# Patient Record
Sex: FEMALE | Race: WHITE | NOT HISPANIC OR LATINO
[De-identification: names, ages, dates, MRNs, and addresses within clinical notes are randomized per-mention and may not be internally consistent; named-entity substitution may affect disease eponyms.]

---

## 2019-03-06 ENCOUNTER — TRANSCRIPTION ENCOUNTER (OUTPATIENT)
Age: 74
End: 2019-03-06

## 2019-03-06 ENCOUNTER — INPATIENT (INPATIENT)
Facility: HOSPITAL | Age: 74
LOS: 0 days | Discharge: ANOTHER HOSPITAL | DRG: 280 | End: 2019-03-07
Attending: INTERNAL MEDICINE | Admitting: FAMILY MEDICINE
Payer: MEDICARE

## 2019-03-06 VITALS
HEART RATE: 140 BPM | RESPIRATION RATE: 26 BRPM | SYSTOLIC BLOOD PRESSURE: 86 MMHG | OXYGEN SATURATION: 90 % | TEMPERATURE: 98 F | DIASTOLIC BLOOD PRESSURE: 50 MMHG | WEIGHT: 186.07 LBS

## 2019-03-06 DIAGNOSIS — E11.9 TYPE 2 DIABETES MELLITUS WITHOUT COMPLICATIONS: ICD-10-CM

## 2019-03-06 DIAGNOSIS — E78.5 HYPERLIPIDEMIA, UNSPECIFIED: ICD-10-CM

## 2019-03-06 DIAGNOSIS — Z90.710 ACQUIRED ABSENCE OF BOTH CERVIX AND UTERUS: Chronic | ICD-10-CM

## 2019-03-06 DIAGNOSIS — R06.03 ACUTE RESPIRATORY DISTRESS: ICD-10-CM

## 2019-03-06 DIAGNOSIS — I10 ESSENTIAL (PRIMARY) HYPERTENSION: ICD-10-CM

## 2019-03-06 DIAGNOSIS — I50.9 HEART FAILURE, UNSPECIFIED: ICD-10-CM

## 2019-03-06 DIAGNOSIS — J10.1 INFLUENZA DUE TO OTHER IDENTIFIED INFLUENZA VIRUS WITH OTHER RESPIRATORY MANIFESTATIONS: ICD-10-CM

## 2019-03-06 DIAGNOSIS — I21.4 NON-ST ELEVATION (NSTEMI) MYOCARDIAL INFARCTION: ICD-10-CM

## 2019-03-06 DIAGNOSIS — Z29.9 ENCOUNTER FOR PROPHYLACTIC MEASURES, UNSPECIFIED: ICD-10-CM

## 2019-03-06 LAB
ALBUMIN SERPL ELPH-MCNC: 3.6 G/DL — SIGNIFICANT CHANGE UP (ref 3.3–5)
ALP SERPL-CCNC: 97 U/L — SIGNIFICANT CHANGE UP (ref 40–120)
ALT FLD-CCNC: 34 U/L — SIGNIFICANT CHANGE UP (ref 12–78)
ANION GAP SERPL CALC-SCNC: 8 MMOL/L — SIGNIFICANT CHANGE UP (ref 5–17)
ANION GAP SERPL CALC-SCNC: 9 MMOL/L — SIGNIFICANT CHANGE UP (ref 5–17)
APTT BLD: 180.6 SEC — CRITICAL HIGH (ref 27.5–36.3)
APTT BLD: 33.4 SEC — SIGNIFICANT CHANGE UP (ref 27.5–36.3)
AST SERPL-CCNC: 32 U/L — SIGNIFICANT CHANGE UP (ref 15–37)
BASOPHILS # BLD AUTO: 0.06 K/UL — SIGNIFICANT CHANGE UP (ref 0–0.2)
BASOPHILS NFR BLD AUTO: 0.4 % — SIGNIFICANT CHANGE UP (ref 0–2)
BILIRUB SERPL-MCNC: 0.4 MG/DL — SIGNIFICANT CHANGE UP (ref 0.2–1.2)
BUN SERPL-MCNC: 21 MG/DL — SIGNIFICANT CHANGE UP (ref 7–23)
BUN SERPL-MCNC: 24 MG/DL — HIGH (ref 7–23)
CALCIUM SERPL-MCNC: 8.1 MG/DL — LOW (ref 8.5–10.1)
CALCIUM SERPL-MCNC: 8.7 MG/DL — SIGNIFICANT CHANGE UP (ref 8.5–10.1)
CHLORIDE SERPL-SCNC: 101 MMOL/L — SIGNIFICANT CHANGE UP (ref 96–108)
CHLORIDE SERPL-SCNC: 102 MMOL/L — SIGNIFICANT CHANGE UP (ref 96–108)
CK MB BLD-MCNC: 3 % — SIGNIFICANT CHANGE UP (ref 0–3.5)
CK MB CFR SERPL CALC: 12.2 NG/ML — HIGH (ref 0–3.6)
CK MB CFR SERPL CALC: 3.6 NG/ML — SIGNIFICANT CHANGE UP (ref 0–3.6)
CK SERPL-CCNC: 406 U/L — HIGH (ref 26–192)
CO2 SERPL-SCNC: 26 MMOL/L — SIGNIFICANT CHANGE UP (ref 22–31)
CO2 SERPL-SCNC: 30 MMOL/L — SIGNIFICANT CHANGE UP (ref 22–31)
CREAT SERPL-MCNC: 1.4 MG/DL — HIGH (ref 0.5–1.3)
CREAT SERPL-MCNC: 1.5 MG/DL — HIGH (ref 0.5–1.3)
EOSINOPHIL # BLD AUTO: 0.01 K/UL — SIGNIFICANT CHANGE UP (ref 0–0.5)
EOSINOPHIL NFR BLD AUTO: 0.1 % — SIGNIFICANT CHANGE UP (ref 0–6)
FLU A RESULT: DETECTED
FLU A RESULT: DETECTED
FLUAV AG NPH QL: DETECTED
FLUBV AG NPH QL: SIGNIFICANT CHANGE UP
GLUCOSE SERPL-MCNC: 147 MG/DL — HIGH (ref 70–99)
GLUCOSE SERPL-MCNC: 370 MG/DL — HIGH (ref 70–99)
HCT VFR BLD CALC: 43.8 % — SIGNIFICANT CHANGE UP (ref 34.5–45)
HCT VFR BLD CALC: 43.8 % — SIGNIFICANT CHANGE UP (ref 34.5–45)
HGB BLD-MCNC: 13.8 G/DL — SIGNIFICANT CHANGE UP (ref 11.5–15.5)
HGB BLD-MCNC: 13.9 G/DL — SIGNIFICANT CHANGE UP (ref 11.5–15.5)
IMM GRANULOCYTES NFR BLD AUTO: 0.9 % — SIGNIFICANT CHANGE UP (ref 0–1.5)
INR BLD: 1.18 RATIO — HIGH (ref 0.88–1.16)
LYMPHOCYTES # BLD AUTO: 1.9 K/UL — SIGNIFICANT CHANGE UP (ref 1–3.3)
LYMPHOCYTES # BLD AUTO: 12.8 % — LOW (ref 13–44)
MAGNESIUM SERPL-MCNC: 2 MG/DL — SIGNIFICANT CHANGE UP (ref 1.6–2.6)
MCHC RBC-ENTMCNC: 29.1 PG — SIGNIFICANT CHANGE UP (ref 27–34)
MCHC RBC-ENTMCNC: 29.2 PG — SIGNIFICANT CHANGE UP (ref 27–34)
MCHC RBC-ENTMCNC: 31.5 GM/DL — LOW (ref 32–36)
MCHC RBC-ENTMCNC: 31.7 GM/DL — LOW (ref 32–36)
MCV RBC AUTO: 91.8 FL — SIGNIFICANT CHANGE UP (ref 80–100)
MCV RBC AUTO: 92.8 FL — SIGNIFICANT CHANGE UP (ref 80–100)
MONOCYTES # BLD AUTO: 1.28 K/UL — HIGH (ref 0–0.9)
MONOCYTES NFR BLD AUTO: 8.6 % — SIGNIFICANT CHANGE UP (ref 2–14)
NEUTROPHILS # BLD AUTO: 11.47 K/UL — HIGH (ref 1.8–7.4)
NEUTROPHILS NFR BLD AUTO: 77.2 % — HIGH (ref 43–77)
NRBC # BLD: 0 /100 WBCS — SIGNIFICANT CHANGE UP (ref 0–0)
NRBC # BLD: 0 /100 WBCS — SIGNIFICANT CHANGE UP (ref 0–0)
NT-PROBNP SERPL-SCNC: 5803 PG/ML — HIGH (ref 0–125)
PHOSPHATE SERPL-MCNC: 4.4 MG/DL — SIGNIFICANT CHANGE UP (ref 2.5–4.5)
PLATELET # BLD AUTO: 298 K/UL — SIGNIFICANT CHANGE UP (ref 150–400)
PLATELET # BLD AUTO: 323 K/UL — SIGNIFICANT CHANGE UP (ref 150–400)
POTASSIUM SERPL-MCNC: 4.3 MMOL/L — SIGNIFICANT CHANGE UP (ref 3.5–5.3)
POTASSIUM SERPL-MCNC: 5 MMOL/L — SIGNIFICANT CHANGE UP (ref 3.5–5.3)
POTASSIUM SERPL-SCNC: 4.3 MMOL/L — SIGNIFICANT CHANGE UP (ref 3.5–5.3)
POTASSIUM SERPL-SCNC: 5 MMOL/L — SIGNIFICANT CHANGE UP (ref 3.5–5.3)
PROCALCITONIN SERPL-MCNC: 0.05 NG/ML — HIGH (ref 0–0.04)
PROT SERPL-MCNC: 8 G/DL — SIGNIFICANT CHANGE UP (ref 6–8.3)
PROTHROM AB SERPL-ACNC: 13.4 SEC — HIGH (ref 10–12.9)
RBC # BLD: 4.72 M/UL — SIGNIFICANT CHANGE UP (ref 3.8–5.2)
RBC # BLD: 4.77 M/UL — SIGNIFICANT CHANGE UP (ref 3.8–5.2)
RBC # FLD: 13.7 % — SIGNIFICANT CHANGE UP (ref 10.3–14.5)
RBC # FLD: 13.8 % — SIGNIFICANT CHANGE UP (ref 10.3–14.5)
RSV RESULT: SIGNIFICANT CHANGE UP
RSV RNA RESP QL NAA+PROBE: SIGNIFICANT CHANGE UP
SODIUM SERPL-SCNC: 136 MMOL/L — SIGNIFICANT CHANGE UP (ref 135–145)
SODIUM SERPL-SCNC: 140 MMOL/L — SIGNIFICANT CHANGE UP (ref 135–145)
TROPONIN I SERPL-MCNC: 0.3 NG/ML — HIGH (ref 0.01–0.04)
TROPONIN I SERPL-MCNC: 4.03 NG/ML — HIGH (ref 0.01–0.04)
WBC # BLD: 12.54 K/UL — HIGH (ref 3.8–10.5)
WBC # BLD: 14.85 K/UL — HIGH (ref 3.8–10.5)
WBC # FLD AUTO: 12.54 K/UL — HIGH (ref 3.8–10.5)
WBC # FLD AUTO: 14.85 K/UL — HIGH (ref 3.8–10.5)

## 2019-03-06 PROCEDURE — 71045 X-RAY EXAM CHEST 1 VIEW: CPT | Mod: 26

## 2019-03-06 PROCEDURE — 99291 CRITICAL CARE FIRST HOUR: CPT

## 2019-03-06 PROCEDURE — 99223 1ST HOSP IP/OBS HIGH 75: CPT | Mod: AI,GC

## 2019-03-06 PROCEDURE — 93010 ELECTROCARDIOGRAM REPORT: CPT

## 2019-03-06 RX ORDER — INSULIN GLARGINE 100 [IU]/ML
30 INJECTION, SOLUTION SUBCUTANEOUS AT BEDTIME
Qty: 0 | Refills: 0 | Status: DISCONTINUED | OUTPATIENT
Start: 2019-03-06 | End: 2019-03-07

## 2019-03-06 RX ORDER — CLOPIDOGREL BISULFATE 75 MG/1
300 TABLET, FILM COATED ORAL ONCE
Qty: 0 | Refills: 0 | Status: COMPLETED | OUTPATIENT
Start: 2019-03-06 | End: 2019-03-06

## 2019-03-06 RX ORDER — DEXTROSE 50 % IN WATER 50 %
25 SYRINGE (ML) INTRAVENOUS ONCE
Qty: 0 | Refills: 0 | Status: DISCONTINUED | OUTPATIENT
Start: 2019-03-06 | End: 2019-03-07

## 2019-03-06 RX ORDER — INSULIN GLARGINE 100 [IU]/ML
30 INJECTION, SOLUTION SUBCUTANEOUS AT BEDTIME
Qty: 0 | Refills: 0 | Status: DISCONTINUED | OUTPATIENT
Start: 2019-03-06 | End: 2019-03-06

## 2019-03-06 RX ORDER — SODIUM CHLORIDE 9 MG/ML
1000 INJECTION, SOLUTION INTRAVENOUS
Qty: 0 | Refills: 0 | Status: DISCONTINUED | OUTPATIENT
Start: 2019-03-06 | End: 2019-03-07

## 2019-03-06 RX ORDER — IPRATROPIUM/ALBUTEROL SULFATE 18-103MCG
3 AEROSOL WITH ADAPTER (GRAM) INHALATION EVERY 6 HOURS
Qty: 0 | Refills: 0 | Status: DISCONTINUED | OUTPATIENT
Start: 2019-03-06 | End: 2019-03-07

## 2019-03-06 RX ORDER — HEPARIN SODIUM 5000 [USP'U]/ML
5000 INJECTION INTRAVENOUS; SUBCUTANEOUS ONCE
Qty: 0 | Refills: 0 | Status: COMPLETED | OUTPATIENT
Start: 2019-03-06 | End: 2019-03-06

## 2019-03-06 RX ORDER — CLOPIDOGREL BISULFATE 75 MG/1
75 TABLET, FILM COATED ORAL DAILY
Qty: 0 | Refills: 0 | Status: DISCONTINUED | OUTPATIENT
Start: 2019-03-06 | End: 2019-03-06

## 2019-03-06 RX ORDER — CLOPIDOGREL BISULFATE 75 MG/1
75 TABLET, FILM COATED ORAL DAILY
Qty: 0 | Refills: 0 | Status: DISCONTINUED | OUTPATIENT
Start: 2019-03-07 | End: 2019-03-07

## 2019-03-06 RX ORDER — GLUCAGON INJECTION, SOLUTION 0.5 MG/.1ML
1 INJECTION, SOLUTION SUBCUTANEOUS ONCE
Qty: 0 | Refills: 0 | Status: DISCONTINUED | OUTPATIENT
Start: 2019-03-06 | End: 2019-03-06

## 2019-03-06 RX ORDER — HEPARIN SODIUM 5000 [USP'U]/ML
5000 INJECTION INTRAVENOUS; SUBCUTANEOUS EVERY 6 HOURS
Qty: 0 | Refills: 0 | Status: DISCONTINUED | OUTPATIENT
Start: 2019-03-06 | End: 2019-03-07

## 2019-03-06 RX ORDER — DEXTROSE 50 % IN WATER 50 %
15 SYRINGE (ML) INTRAVENOUS ONCE
Qty: 0 | Refills: 0 | Status: DISCONTINUED | OUTPATIENT
Start: 2019-03-06 | End: 2019-03-06

## 2019-03-06 RX ORDER — ASPIRIN/CALCIUM CARB/MAGNESIUM 324 MG
81 TABLET ORAL DAILY
Qty: 0 | Refills: 0 | Status: DISCONTINUED | OUTPATIENT
Start: 2019-03-07 | End: 2019-03-07

## 2019-03-06 RX ORDER — METOPROLOL TARTRATE 50 MG
25 TABLET ORAL EVERY 8 HOURS
Qty: 0 | Refills: 0 | Status: DISCONTINUED | OUTPATIENT
Start: 2019-03-06 | End: 2019-03-07

## 2019-03-06 RX ORDER — DEXTROSE 50 % IN WATER 50 %
25 SYRINGE (ML) INTRAVENOUS ONCE
Qty: 0 | Refills: 0 | Status: DISCONTINUED | OUTPATIENT
Start: 2019-03-06 | End: 2019-03-06

## 2019-03-06 RX ORDER — LATANOPROST 0.05 MG/ML
1 SOLUTION/ DROPS OPHTHALMIC; TOPICAL AT BEDTIME
Qty: 0 | Refills: 0 | Status: DISCONTINUED | OUTPATIENT
Start: 2019-03-06 | End: 2019-03-07

## 2019-03-06 RX ORDER — FUROSEMIDE 40 MG
40 TABLET ORAL DAILY
Qty: 0 | Refills: 0 | Status: DISCONTINUED | OUTPATIENT
Start: 2019-03-06 | End: 2019-03-07

## 2019-03-06 RX ORDER — ASPIRIN/CALCIUM CARB/MAGNESIUM 324 MG
325 TABLET ORAL ONCE
Qty: 0 | Refills: 0 | Status: COMPLETED | OUTPATIENT
Start: 2019-03-06 | End: 2019-03-06

## 2019-03-06 RX ORDER — HEPARIN SODIUM 5000 [USP'U]/ML
INJECTION INTRAVENOUS; SUBCUTANEOUS
Qty: 25000 | Refills: 0 | Status: DISCONTINUED | OUTPATIENT
Start: 2019-03-06 | End: 2019-03-07

## 2019-03-06 RX ORDER — FUROSEMIDE 40 MG
40 TABLET ORAL ONCE
Qty: 0 | Refills: 0 | Status: COMPLETED | OUTPATIENT
Start: 2019-03-06 | End: 2019-03-06

## 2019-03-06 RX ORDER — INSULIN LISPRO 100/ML
VIAL (ML) SUBCUTANEOUS
Qty: 0 | Refills: 0 | Status: DISCONTINUED | OUTPATIENT
Start: 2019-03-06 | End: 2019-03-06

## 2019-03-06 RX ORDER — INFLUENZA VIRUS VACCINE 15; 15; 15; 15 UG/.5ML; UG/.5ML; UG/.5ML; UG/.5ML
0.5 SUSPENSION INTRAMUSCULAR ONCE
Qty: 0 | Refills: 0 | Status: DISCONTINUED | OUTPATIENT
Start: 2019-03-06 | End: 2019-03-07

## 2019-03-06 RX ORDER — INSULIN LISPRO 100/ML
3 VIAL (ML) SUBCUTANEOUS
Qty: 0 | Refills: 0 | Status: DISCONTINUED | OUTPATIENT
Start: 2019-03-06 | End: 2019-03-06

## 2019-03-06 RX ORDER — INSULIN LISPRO 100/ML
VIAL (ML) SUBCUTANEOUS AT BEDTIME
Qty: 0 | Refills: 0 | Status: DISCONTINUED | OUTPATIENT
Start: 2019-03-06 | End: 2019-03-06

## 2019-03-06 RX ORDER — INSULIN LISPRO 100/ML
VIAL (ML) SUBCUTANEOUS
Qty: 0 | Refills: 0 | Status: DISCONTINUED | OUTPATIENT
Start: 2019-03-06 | End: 2019-03-07

## 2019-03-06 RX ORDER — INSULIN GLARGINE 100 [IU]/ML
80 INJECTION, SOLUTION SUBCUTANEOUS
Qty: 0 | Refills: 0 | COMMUNITY

## 2019-03-06 RX ORDER — ATORVASTATIN CALCIUM 80 MG/1
40 TABLET, FILM COATED ORAL AT BEDTIME
Qty: 0 | Refills: 0 | Status: DISCONTINUED | OUTPATIENT
Start: 2019-03-06 | End: 2019-03-07

## 2019-03-06 RX ORDER — DEXTROSE 50 % IN WATER 50 %
12.5 SYRINGE (ML) INTRAVENOUS ONCE
Qty: 0 | Refills: 0 | Status: DISCONTINUED | OUTPATIENT
Start: 2019-03-06 | End: 2019-03-07

## 2019-03-06 RX ORDER — INSULIN LISPRO 100/ML
VIAL (ML) SUBCUTANEOUS EVERY 6 HOURS
Qty: 0 | Refills: 0 | Status: DISCONTINUED | OUTPATIENT
Start: 2019-03-06 | End: 2019-03-06

## 2019-03-06 RX ORDER — ATORVASTATIN CALCIUM 80 MG/1
1 TABLET, FILM COATED ORAL
Qty: 0 | Refills: 0 | COMMUNITY

## 2019-03-06 RX ORDER — GLUCAGON INJECTION, SOLUTION 0.5 MG/.1ML
1 INJECTION, SOLUTION SUBCUTANEOUS ONCE
Qty: 0 | Refills: 0 | Status: DISCONTINUED | OUTPATIENT
Start: 2019-03-06 | End: 2019-03-07

## 2019-03-06 RX ORDER — DEXTROSE 50 % IN WATER 50 %
15 SYRINGE (ML) INTRAVENOUS ONCE
Qty: 0 | Refills: 0 | Status: DISCONTINUED | OUTPATIENT
Start: 2019-03-06 | End: 2019-03-07

## 2019-03-06 RX ORDER — DEXTROSE 50 % IN WATER 50 %
12.5 SYRINGE (ML) INTRAVENOUS ONCE
Qty: 0 | Refills: 0 | Status: DISCONTINUED | OUTPATIENT
Start: 2019-03-06 | End: 2019-03-06

## 2019-03-06 RX ORDER — SODIUM CHLORIDE 9 MG/ML
1000 INJECTION, SOLUTION INTRAVENOUS
Qty: 0 | Refills: 0 | Status: DISCONTINUED | OUTPATIENT
Start: 2019-03-06 | End: 2019-03-06

## 2019-03-06 RX ORDER — INSULIN LISPRO 100/ML
3 VIAL (ML) SUBCUTANEOUS
Qty: 0 | Refills: 0 | Status: DISCONTINUED | OUTPATIENT
Start: 2019-03-06 | End: 2019-03-07

## 2019-03-06 RX ORDER — FUROSEMIDE 40 MG
40 TABLET ORAL
Qty: 0 | Refills: 0 | Status: DISCONTINUED | OUTPATIENT
Start: 2019-03-06 | End: 2019-03-06

## 2019-03-06 RX ADMIN — Medication 40 MILLIGRAM(S): at 12:00

## 2019-03-06 RX ADMIN — INSULIN GLARGINE 30 UNIT(S): 100 INJECTION, SOLUTION SUBCUTANEOUS at 21:22

## 2019-03-06 RX ADMIN — HEPARIN SODIUM 1000 UNIT(S)/HR: 5000 INJECTION INTRAVENOUS; SUBCUTANEOUS at 13:42

## 2019-03-06 RX ADMIN — HEPARIN SODIUM 0 UNIT(S)/HR: 5000 INJECTION INTRAVENOUS; SUBCUTANEOUS at 20:42

## 2019-03-06 RX ADMIN — ATORVASTATIN CALCIUM 40 MILLIGRAM(S): 80 TABLET, FILM COATED ORAL at 15:24

## 2019-03-06 RX ADMIN — HEPARIN SODIUM 5000 UNIT(S): 5000 INJECTION INTRAVENOUS; SUBCUTANEOUS at 13:41

## 2019-03-06 RX ADMIN — Medication 200 MILLIGRAM(S): at 21:47

## 2019-03-06 RX ADMIN — Medication 25 MILLIGRAM(S): at 13:54

## 2019-03-06 RX ADMIN — Medication 75 MILLIGRAM(S): at 13:54

## 2019-03-06 RX ADMIN — HEPARIN SODIUM 750 UNIT(S)/HR: 5000 INJECTION INTRAVENOUS; SUBCUTANEOUS at 21:47

## 2019-03-06 RX ADMIN — LATANOPROST 1 DROP(S): 0.05 SOLUTION/ DROPS OPHTHALMIC; TOPICAL at 15:24

## 2019-03-06 RX ADMIN — Medication 3 MILLILITER(S): at 14:17

## 2019-03-06 RX ADMIN — Medication 3 MILLILITER(S): at 20:12

## 2019-03-06 RX ADMIN — Medication 25 MILLIGRAM(S): at 21:22

## 2019-03-06 RX ADMIN — CLOPIDOGREL BISULFATE 300 MILLIGRAM(S): 75 TABLET, FILM COATED ORAL at 15:24

## 2019-03-06 RX ADMIN — Medication 325 MILLIGRAM(S): at 13:54

## 2019-03-06 RX ADMIN — Medication 40 MILLIGRAM(S): at 18:47

## 2019-03-06 NOTE — CONSULT NOTE ADULT - SUBJECTIVE AND OBJECTIVE BOX
REQUESTING PHYSICIAN:    REASON FOR CONSULT: Patient is a 73y old  Female who presents with a chief complaint of NSTEMI , c/o sob , chest pressure (06 Mar 2019 13:15)      CHIEF COMPLAINT: Patient is a 73y old  Female who presents with a chief complaint of NSTEMI , c/o sob , chest pressure (06 Mar 2019 13:15)      HPI:  Pt is a 72 y/o F with PMHx of HTN, HLD, Type II DM (on Lantus, glimepiride), who presents with worsening SOB and chest pressure over last month. Pt states she has been feeling SOB after walking 50 feet for past month. Pt states chest pressure lies over midline chest, and radiates to both sides of her jaw, and both arms.  as per cardio [facility] pe heart gp pt was seen ago in there office     Of note pt had previously followed Dr. Christie (cardiology), and has had 3 coronary catheterizations over past 10 years, with most recent cath 2008 for abnormal ECG. Pt states these caths have been unremarkable.    In the ED: VS T 98, , /79 RR 26 90%RA. Labs significant for WBC 14.85, Cr 1.50, glu 370, Ca 8.1, proCal 0.05, POCT 277. Pt was started on BiPAP in ED, with improval in sats to 100%. Pt evaluated by ICU in ED. (06 Mar 2019 13:15)      PAST MEDICAL / SURGICAL HISTORY:  PAST MEDICAL & SURGICAL HISTORY:  Uterine cancer  Diabetes mellitus  Hypertension  Hyperlipidemia  S/P MAYNOR (total abdominal hysterectomy)        FAMILY HISTORY: FAMILY HISTORY:  Family history of type 2 diabetes mellitus (Father)      MEDICATIONS  (STANDING):  ALBUTerol/ipratropium for Nebulization 3 milliLiter(s) Nebulizer every 6 hours  atorvastatin 40 milliGRAM(s) Oral at bedtime  clopidogrel Tablet 300 milliGRAM(s) Oral once  dextrose 5%. 1000 milliLiter(s) (50 mL/Hr) IV Continuous <Continuous>  dextrose 50% Injectable 12.5 Gram(s) IV Push once  dextrose 50% Injectable 25 Gram(s) IV Push once  dextrose 50% Injectable 25 Gram(s) IV Push once  furosemide   Injectable 40 milliGRAM(s) IV Push two times a day  heparin  Infusion.  Unit(s)/Hr (10 mL/Hr) IV Continuous <Continuous>  insulin glargine Injectable (LANTUS) 30 Unit(s) SubCutaneous at bedtime  insulin lispro (HumaLOG) corrective regimen sliding scale   SubCutaneous every 6 hours  latanoprost 0.005% Ophthalmic Solution 1 Drop(s) Both EYES at bedtime  metoprolol tartrate 25 milliGRAM(s) Oral every 8 hours  oseltamivir 75 milliGRAM(s) Oral two times a day    MEDICATIONS  (PRN):  dextrose 40% Gel 15 Gram(s) Oral once PRN Blood Glucose LESS THAN 70 milliGRAM(s)/deciliter  glucagon  Injectable 1 milliGRAM(s) IntraMuscular once PRN Glucose LESS THAN 70 milligrams/deciliter  heparin  Injectable 5000 Unit(s) IV Push every 6 hours PRN For aPTT less than 40      Allergies    No Known Allergies    Intolerances        REVIEW OF SYSTEMS:    CONSTITUTIONAL: No weakness, fevers or chills  EYES/ENT: No visual changes;  No vertigo or throat pain   NECK: No pain or stiffness  RESPIRATORY: No cough, wheezing, hemoptysis; No shortness of breath  CARDIOVASCULAR: No chest pain or palpitations  GASTROINTESTINAL: No abdominal pain. No nausea, vomiting, or hematemesis; No diarrhea or constipation. No melena or hematochezia.  GENITOURINARY: No dysuria, frequency or hematuria  NEUROLOGICAL: No numbness or weakness  SKIN: No itching or rash  All other review of systems is negative unless indicated above    VITAL SIGNS:   Vital Signs Last 24 Hrs  T(C): 37.2 (06 Mar 2019 13:45), Max: 37.2 (06 Mar 2019 13:45)  T(F): 98.9 (06 Mar 2019 13:45), Max: 98.9 (06 Mar 2019 13:45)  HR: 108 (06 Mar 2019 14:18) (103 - 140)  BP: 134/79 (06 Mar 2019 13:45) (86/50 - 185/79)  BP(mean): --  RR: 18 (06 Mar 2019 13:45) (16 - 32)  SpO2: 98% (06 Mar 2019 14:18) (90% - 100%)    I&O's Summary      PHYSICAL EXAM:    Constitutional: NAD, awake and alert, well-developed  Eyes:  EOMI,  Pupils round, No oral cyanosis.  Pulmonary: Non-labored, breath sounds are clear bilaterally, No wheezing, rales or rhonchi  Cardiovascular: S1 and S2, regular rate and rhythm, no Murmurs, gallops or rubs  Gastrointestinal: Bowel Sounds present, soft, nontender.   Lymph: No peripheral edema. No cervical lymphadenopathy.  Neurological: Alert, no focal deficits  Skin: No rashes.  Psych:  Mood & affect appropriate    LABS: All Labs Reviewed:                        13.8   14.85 )-----------( 323      ( 06 Mar 2019 10:55 )             43.8     06 Mar 2019 10:54    136    |  101    |  21     ----------------------------<  370    5.0     |  26     |  1.50     Ca    8.1        06 Mar 2019 10:54    TPro  8.0    /  Alb  3.6    /  TBili  0.4    /  DBili  x      /  AST  32     /  ALT  34     /  AlkPhos  97     06 Mar 2019 10:54    PT/INR - ( 06 Mar 2019 10:54 )   PT: 13.4 sec;   INR: 1.18 ratio         PTT - ( 06 Mar 2019 10:54 )  PTT:33.4 sec  CARDIAC MARKERS ( 06 Mar 2019 10:54 )  .296 ng/mL / x     / x     / x     / 3.6 ng/mL      Blood Culture:   03-06 @ 10:54  Pro Bnp [id number]        EKG:    Imaging: REQUESTING PHYSICIAN: Dr. Medeiros    REASON FOR CONSULT: Patient is a 73y old  Female who presents with a chief complaint of NSTEMI , c/o sob , chest pressure (06 Mar 2019 13:15)      CHIEF COMPLAINT: Patient is a 73y old  Female who presents with a chief complaint of NSTEMI , c/o sob , chest pressure (06 Mar 2019 13:15)      HPI:  Pt is a 72 y/o F with PMHx of HTN, HLD, Type II DM (on Lantus, glimepiride), who presents with worsening SOB and chest pressure over last month. Pt states she has been feeling SOB after walking 50 feet for past month. Pt states chest pressure lies over midline chest, and radiates to both sides of her jaw, and both arms.  as per cardio [facility] pe heart gp pt was seen ago in there office     Of note pt had previously followed Dr. Christie (cardiology), and has had 3 coronary catheterizations over past 10 years, with most recent cath 2008 for abnormal ECG. Pt states these caths have been unremarkable.    In the ED: VS T 98, , /79 RR 26 90%RA. Labs significant for WBC 14.85, Cr 1.50, glu 370, Ca 8.1, proCal 0.05, POCT 277. Pt was started on BiPAP in ED, with improval in sats to 100%. Pt evaluated by ICU in ED. (06 Mar 2019 13:15)    Subjective : Pt was seen and examined at ICU , denies chest pain . SOB improved , comfortable at rest . She was seen By Dr. Cooper 2 days ago for the first time for c/o intermittent chest pain and worsening SOB especially with exertion, going on for 6 mths but worsening over the time  . EKG showed NSR , LBBB. ( unclear LBBB is new or old , but she has been evaluated by Dr. Shahnaz Bolden in the past for abnormal EKG and cardiac cath done , claims 3 caths over 10 years last being in 2008 with nonobstructive coronaries according to the pt .  Pt was on ASA 325mg , Bystolic 2.5 mg QD , Losartan 50mg QD and Lipitor 20mg  ( cardiac meds) at home         PAST MEDICAL & SURGICAL HISTORY:  Uterine cancer  Diabetes mellitus  Hypertension  Hyperlipidemia  S/P MAYNOR (total abdominal hysterectomy)        FAMILY HISTORY: FAMILY HISTORY:  Family history of type 2 diabetes mellitus (Father)      MEDICATIONS  (STANDING):  ALBUTerol/ipratropium for Nebulization 3 milliLiter(s) Nebulizer every 6 hours  atorvastatin 40 milliGRAM(s) Oral at bedtime  clopidogrel Tablet 300 milliGRAM(s) Oral once  dextrose 5%. 1000 milliLiter(s) (50 mL/Hr) IV Continuous <Continuous>  dextrose 50% Injectable 12.5 Gram(s) IV Push once  dextrose 50% Injectable 25 Gram(s) IV Push once  dextrose 50% Injectable 25 Gram(s) IV Push once  furosemide   Injectable 40 milliGRAM(s) IV Push two times a day  heparin  Infusion.  Unit(s)/Hr (10 mL/Hr) IV Continuous <Continuous>  insulin glargine Injectable (LANTUS) 30 Unit(s) SubCutaneous at bedtime  insulin lispro (HumaLOG) corrective regimen sliding scale   SubCutaneous every 6 hours  latanoprost 0.005% Ophthalmic Solution 1 Drop(s) Both EYES at bedtime  metoprolol tartrate 25 milliGRAM(s) Oral every 8 hours  oseltamivir 75 milliGRAM(s) Oral two times a day    MEDICATIONS  (PRN):  dextrose 40% Gel 15 Gram(s) Oral once PRN Blood Glucose LESS THAN 70 milliGRAM(s)/deciliter  glucagon  Injectable 1 milliGRAM(s) IntraMuscular once PRN Glucose LESS THAN 70 milligrams/deciliter  heparin  Injectable 5000 Unit(s) IV Push every 6 hours PRN For aPTT less than 40      Allergies    No Known Allergies    Intolerances        REVIEW OF SYSTEMS:    CONSTITUTIONAL: No fever or chills  EYES/ENT: No visual changes;  No vertigo or throat pain   NECK: No pain or stiffness  RESPIRATORY: No cough, wheezing, hemoptysis; + shortness of breath  CARDIOVASCULAR: + chest pain .Denies palpitations  GASTROINTESTINAL: No abdominal pain. No nausea, vomiting, or hematemesis; No diarrhea or constipation. No melena or hematochezia.  GENITOURINARY: No dysuria, frequency or hematuria  NEUROLOGICAL: No numbness or weakness  SKIN: No itching or rash  All other review of systems is negative unless indicated above    VITAL SIGNS:   Vital Signs Last 24 Hrs  T(C): 37.2 (06 Mar 2019 13:45), Max: 37.2 (06 Mar 2019 13:45)  T(F): 98.9 (06 Mar 2019 13:45), Max: 98.9 (06 Mar 2019 13:45)  HR: 108 (06 Mar 2019 14:18) (103 - 140)  BP: 134/79 (06 Mar 2019 13:45) (86/50 - 185/79)  BP(mean): --  RR: 18 (06 Mar 2019 13:45) (16 - 32)  SpO2: 98% (06 Mar 2019 14:18) (90% - 100%)    I&O's Summary      PHYSICAL EXAM:    Constitutional:  awake and alert, in no distress   Eyes:  EOMI,  Pupils round, No oral cyanosis.  Pulmonary: Non-labored, B/L fair entry fair , b/l occasional wheezes and crackes at bases  Cardiovascular: S1 and S2, regular rate and rhythm, ESM / EDM 2/6 RSB  Gastrointestinal: Bowel Sounds present, soft, nontender.   Lymph: No peripheral edema. No cervical lymphadenopathy.  Neurological: Alert, no focal deficits  Skin: No rashes.  Psych:  Mood & affect appropriate    LABS: All Labs Reviewed:                        13.8   14.85 )-----------( 323      ( 06 Mar 2019 10:55 )             43.8     06 Mar 2019 10:54    136    |  101    |  21     ----------------------------<  370    5.0     |  26     |  1.50     Ca    8.1        06 Mar 2019 10:54    TPro  8.0    /  Alb  3.6    /  TBili  0.4    /  DBili  x      /  AST  32     /  ALT  34     /  AlkPhos  97     06 Mar 2019 10:54    PT/INR - ( 06 Mar 2019 10:54 )   PT: 13.4 sec;   INR: 1.18 ratio         PTT - ( 06 Mar 2019 10:54 )  PTT:33.4 sec  CARDIAC MARKERS ( 06 Mar 2019 10:54 )  .296 ng/mL / x     / x     / x     / 3.6 ng/mL      Blood Culture:   03-06 @ 10:54  Pro Bnp [id number]        EKG: NSR, LBBB  Tele: sinus tachy in 100-100s     Imaging:     EXAM:  XR CHEST PORTABLE URGENT 1V                            PROCEDURE DATE:  03/06/2019          INTERPRETATION:  Clinical information: Dyspnea.    Technique: Frontal view of the chest.    comparison: None available.    Findings: The lungs are clear. The heart size is enlarged. There are mild   multilevel degenerative changes of the thoracic spine.    IMPRESSION: Cardiomegaly.

## 2019-03-06 NOTE — CONSULT NOTE ADULT - PROBLEM SELECTOR RECOMMENDATION 2
c/o chest pressure and SOB ,  Trop 0.29, Serial troponin q 6 hrs , trend CE   EKG : NSR , LBBB9 (not new )   Obtain Echo  Continue ASA , Plavix 300mg Loaded , 75 mg daily , Lipitor 40mg , Metoprolol   Therapeutic anticoagulation with Heparin drip , monitor PTT  Continue cardiac monitor  Will need cardiac catheterization to evaluate for obstructive coronary artery disease and will arrange transfer to [facility] .

## 2019-03-06 NOTE — DISCHARGE NOTE PROVIDER - HOSPITAL COURSE
FROM ADMISSION H+P:     HPI:    Pt is a 74 y/o F with PMHx of HTN, HLD, Type II DM (on Lantus, glimepiride), who presents with worsening SOB and chest pressure over last month. Pt states she has been feeling SOB after walking 50 feet for past month. Pt states chest pressure lies over midline chest, and radiates to both sides of her jaw, and both arms.  as per cardio [facility] pe heart gp pt was seen ago in there office         Of note pt had previously followed Dr. Christie (cardiology), and has had 3 coronary catheterizations over past 10 years, with most recent cath 2008 for abnormal ECG. Pt states these caths have been unremarkable.        In the ED: VS T 98, , /79 RR 26 90%RA. Labs significant for WBC 14.85, Cr 1.50, glu 370, Ca 8.1, proCal 0.05, POCT 277. Pt was started on BiPAP in ED, with improval in sats to 100%. Pt evaluated by ICU in ED. (06 Mar 2019 13:15)            ---    HOSPITAL COURSE:     73 yr female wit PMHx of HTN, HLD, CAD s/p catherization x3 over 10 yr period, last in 2008. Pt endorses no abnormal findings. Asthma, uterine Ca s/p Rt therapy  and TSH/BSO, Glaucoma who presents today (3/6/19) [facility] hospital from home with progressive worsening SOB with productive cough of clear sputum over past 2 to 3 days accompanied by worsening midsternal chest pain radiating to bilateral jaw over same period relieved with rest. Pt endorses SOB has been progressive over past 6 month period and was meant for cardiac workup with nuclear stress test tomorrow. Pt denies N/V/D/F/C. Pt denies and increase in Lower Extremity edema. In E.D. Pt found to be hypoxic with SPO2 90% on Rm Air with increased WOB and crackles on exam, placed on Bipap therapy, received 40mg lasix IVP. ECG on admission demonstrate LBBB (not new), C.E. with +0.296. In addition pt found to have Influenza A+. Consult and admission to ICU for NSTEMI/ADHF and Flu A+. Patient was transferred to ICU for further monitoring. Patient was started on course of tamiflu for influenza A. Given full dose ASA + plavix load. Continued on DAPT and heparin gtt. ARB held for FLORIDALMA. TTE was performed. Diuresed with IV lasix. Cardio Dr. Boyce arranged for transfer to [facility] for cardiac catheterization.    ---    CONSULTANTS:     Cardiology: [facility]    --- FROM ADMISSION H+P:     HPI:    Pt is a 72 y/o F with PMHx of HTN, HLD, Type II DM (on Lantus, glimepiride), who presents with worsening SOB and chest pressure over last month. Pt states she has been feeling SOB after walking 50 feet for past month. Pt states chest pressure lies over midline chest, and radiates to both sides of her jaw, and both arms.  as per cardio [facility] pe heart gp pt was seen ago in there office         Of note pt had previously followed Dr. Christie (cardiology), and has had 3 coronary catheterizations over past 10 years, with most recent cath 2008 for abnormal ECG. Pt states these caths have been unremarkable.        In the ED: VS T 98, , /79 RR 26 90%RA. Labs significant for WBC 14.85, Cr 1.50, glu 370, Ca 8.1, proCal 0.05, POCT 277. Pt was started on BiPAP in ED, with improval in sats to 100%. Pt evaluated by ICU in ED. (06 Mar 2019 13:15)            ---    HOSPITAL COURSE:     73 yr female wit PMHx of HTN, HLD, CAD s/p catherization x3 over 10 yr period, last in 2008. Pt endorses no abnormal findings. Asthma, uterine Ca s/p Rt therapy  and TSH/BSO, Glaucoma who presents today (3/6/19) [facility] hospital from home with progressive worsening SOB with productive cough of clear sputum over past 2 to 3 days accompanied by worsening midsternal chest pain radiating to bilateral jaw over same period relieved with rest. Pt endorses SOB has been progressive over past 6 month period and was meant for cardiac workup with nuclear stress test tomorrow. Pt denies N/V/D/F/C. Pt denies and increase in Lower Extremity edema. In E.D. Pt found to be hypoxic with SPO2 90% on Rm Air with increased WOB and crackles on exam, placed on Bipap therapy, received 40mg lasix IVP. ECG on admission demonstrate LBBB (not new), C.E. with +0.296. In addition pt found to have Influenza A+. Consult and admission to ICU for NSTEMI/ADHF and Flu A+. Patient was transferred to ICU for further monitoring. Patient was started on course of tamiflu for influenza A. Given full dose ASA + plavix load. Continued on DAPT and heparin gtt. ARB held for FLORIDALMA. TTE was performed. Diuresed with IV lasix. Cardio Dr. Boyce arranged for transfer to [facility] for cardiac catheterization. Seen by Dr. Menezes ([facility]) on 3/7, \[facility] [place] [facility]\"" transfer arranged for possible cardiac catheterization on 3/7/19 (ate breakfast).    ---    CONSULTANTS:     Cardiology: [facility]    ---

## 2019-03-06 NOTE — ED PROVIDER NOTE - PROGRESS NOTE DETAILS
EKG w/ LBBB, no prior to compare, per  pt scheduled to see Dr Lopez for cardiac evaluation, pt having MORTON x 1 month. cxr w/ pulm congestion, labs sig for markedly elevated bnp, elevated trop, improved on bipap, given iv laxis, cardio consulted, pt admitted for further eval and mgmt

## 2019-03-06 NOTE — ED PROVIDER NOTE - NS ED ROS FT
GEN: no fever, no chills, no weakness  HENT: no eye pain, no visual changes, no ear pain, no visual or hearing changes, no sore throat, no swelling or neck pain  CV: no chest pain, no palpitations, no dizziness, no swelling  RESP: no coughing, + sob, +IWOB, no MORTON  GI: no abd pain, no distension, no nausea, no vomiting, no diarrhea, no constipation  : no dysuria,  no frequency, no hematuria, no discharge, no flank pain  MUSCULOSKELETAL: no myalgia, no arthralgia, no joint swelling, no bruising   SKIN: no rash, no wounds, no itching  NEURO: no change in mentation, no visual changes, no HA, no focal weakness, no trouble speaking, no gait abnormalities, no dizziness  PSYCH: no suidical ideation, no homicidal ideation, no depression, no anxiety, no hallucinations

## 2019-03-06 NOTE — CONSULT NOTE ADULT - ASSESSMENT
Assessment:    Plan:    #Neuro:  -neuro checks q 4 hrs and prn for changes  -bedrest at present    #Pulm:  -pt with Hx of asthma, now with ADHF/NSTEMI  -bipap therapy - titrate to ph 7.35-7.45; PCO2 35-45; SPO2 > 92%  -duoneb therapy q 6 hrs prn sob/wheezes      #CV:    #GI/:    #ID:    #FEN/ENDO/HEME:          Critical Care Time: 40minutes   Reviewing data, imaging, discussing with multidisciplinary team, not inclusive of procedures, discussing goals of care with family Assessment:    Plan:    #Neuro:  -neuro checks q 4 hrs and prn for changes  -bedrest at present    #Pulm:  -pt with Hx of asthma, now with ADHF/NSTEMI  -bipap therapy - titrate to ph 7.35-7.45; PCO2 35-45; SPO2 > 92%  -duoneb therapy q 6 hrs prn sob/wheezes  -HOB >/= 30 degree angle  -Pt Influenza A +  -Oseltamivir 75 mg po q 12 hrs x 5 days    #CV:  -Pt with LBBB (not new), now with NSTEMI  -ECG now and q am x 3  -Cardiac enzymes now and q 8 hrs x 3  -Heparin gtt - titrate to maintain PTT 60 to 99  -ASA 81 mg po qd  -Plavix 75 mg po qd  -Atorvastatin 40 mg po qhs  -pt on home meds of metoprolol - continue 25 mg po q 8 hrs  -Pt for possible cath in a.m.    #GI/:  -DASH diet as tolerated  -NPO after MN for possible cath in a.m.  -As pt presents with ADHF  -strict I & O's - keep neg 500 to 1000 cc's  -lasix 40 mg IV q 12 hrs    #ID:  -Pt with Influenza A+   -Oseltamivir 75 mg po q 12 hrs x 5 days    #FEN/ENDO/HEME:  -pt with hx of glaucoma  -latanoprost gtt  -cmp/mg++/po--4/cbc/coag now and qam  -pt with hx of DM2 on lantus  -POC glucose q 6 hrs with ISS - maintain glucose 140 -160  -lantus 30 units subq q hs          Critical Care Time: 40minutes   Reviewing data, imaging, discussing with multidisciplinary team, not inclusive of procedures, discussing goals of care with family Assessment:    Plan:    #Neuro:  -neuro checks q 4 hrs and prn for changes  -bedrest at present    #Pulm:  -pt with Hx of asthma, now with ADHF/NSTEMI  -bipap therapy - titrate to ph 7.35-7.45; PCO2 35-45; SPO2 > 92%  -duoneb therapy q 6 hrs prn sob/wheezes  -HOB >/= 30 degree angle  -Pt Influenza A +  -Oseltamivir 75 mg po q 12 hrs x 5 days    #CV:  -Pt with LBBB (not new), now with NSTEMI  -ECG now and q am x 3  -Cardiac enzymes now and q 8 hrs x 3  -Heparin gtt - titrate to maintain PTT 60 to 99  -ASA 81 mg po qd  -Plavix 75 mg po qd  -Atorvastatin 40 mg po qhs  -pt on home meds of metoprolol - continue 25 mg po q 8 hrs  -Pt for possible cath in a.m.    #GI/:  -DASH diet as tolerated  -NPO after MN for possible cath in a.m.  -As pt presents with ADHF  -pt with FLORIDALMA sCr 1.5 ( unknown baseline) - will f/u  -strict I & O's - keep neg 500 to 1000 cc's  -lasix 40 mg IV q 12 hrs    #ID:  -Pt with Influenza A+   -Oseltamivir 75 mg po q 12 hrs x 5 days    #FEN/ENDO/HEME:  -pt with hx of glaucoma  -latanoprost gtt  -cmp/mg++/po--4/cbc/coag now and qam  -pt with hx of DM2 on lantus  -POC glucose q 6 hrs with ISS - maintain glucose 140 -160  -lantus 30 units subq q hs          Critical Care Time: 40minutes   Reviewing data, imaging, discussing with multidisciplinary team, not inclusive of procedures, discussing goals of care with family Assessment:  73yr old female with stated hx significant for HTN, CAD, Asthma, who presents with 6 month period of progressive sob with worsening over past 2 to 3 days accompanied by productive cough and midsternal chest pain. found to have + troponins, LBBB (Not new) with Flu A+. Consult called and pt admitted to ICU for NSTEMI     Plan:    #Neuro:  -neuro checks q 4 hrs and prn for changes  -bedrest at present    #Pulm:  -pt with Hx of asthma, now with ADHF/NSTEMI  -bipap therapy - titrate to ph 7.35-7.45; PCO2 35-45; SPO2 > 92%  -duoneb therapy q 6 hrs prn sob/wheezes  -HOB >/= 30 degree angle  -Pt Influenza A +  -Oseltamivir 75 mg po q 12 hrs x 5 days    #CV:  -Pt with LBBB (not new), now with NSTEMI  -ECG now and q am x 3  -Cardiac enzymes now and q 8 hrs x 3  -Heparin gtt - titrate to maintain PTT 60 to 99  -ASA 81 mg po qd  -Plavix 75 mg po qd  -Atorvastatin 40 mg po qhs  -pt on home meds of metoprolol - continue 25 mg po q 8 hrs  -Pt for possible cath in a.m.    #GI/:  -DASH diet as tolerated  -NPO after MN for possible cath in a.m.  -As pt presents with ADHF  -pt with FLORIDALMA sCr 1.5 ( unknown baseline) - will f/u  -strict I & O's - keep neg 500 to 1000 cc's  -lasix 40 mg IV q 12 hrs    #ID:  -Pt with Influenza A+   -Oseltamivir 75 mg po q 12 hrs x 5 days    #FEN/ENDO/HEME:  -pt with hx of glaucoma  -latanoprost gtt  -cmp/mg++/po--4/cbc/coag now and qam  -pt with hx of DM2 on lantus  -POC glucose q 6 hrs with ISS - maintain glucose 140 -160  -lantus 30 units subq q hs          Critical Care Time: 40minutes   Reviewing data, imaging, discussing with multidisciplinary team, not inclusive of procedures, discussing goals of care with family Assessment:  73yr old female with stated hx significant for HTN, CAD, Asthma, who presents with 6 month period of progressive sob with worsening over past 2 to 3 days accompanied by productive cough and midsternal chest pain. found to have + troponins, LBBB (Not new) with Flu A+. Consult called and pt admitted to ICU for NSTEMI with ADHF    Plan:    #Neuro:  -neuro checks q 4 hrs and prn for changes  -bedrest at present    #Pulm:  -pt with Hx of asthma, now with ADHF/NSTEMI  -bipap therapy - titrate to ph 7.35-7.45; PCO2 35-45; SPO2 > 92%  -duoneb therapy q 6 hrs prn sob/wheezes  -HOB >/= 30 degree angle  -Pt Influenza A +  -Oseltamivir 75 mg po q 12 hrs x 5 days    #CV:  -Pt with LBBB (not new), now with NSTEMI  -ECG now and q am x 3  -Cardiac enzymes now and q 8 hrs x 3  -Heparin gtt - titrate to maintain PTT 60 to 99  -ASA 81 mg po qd  -Plavix 75 mg po qd  -Atorvastatin 40 mg po qhs  -pt on home meds of metoprolol - continue 25 mg po q 8 hrs  -Pt for possible cath in a.m.    #GI/:  -DASH diet as tolerated  -NPO after MN for possible cath in a.m.  -As pt presents with ADHF  -pt with FLORIDALMA sCr 1.5 ( unknown baseline) - will f/u  -strict I & O's - keep neg 500 to 1000 cc's  -lasix 40 mg IV q 12 hrs    #ID:  -Pt with Influenza A+   -Oseltamivir 75 mg po q 12 hrs x 5 days    #FEN/ENDO/HEME:  -pt with hx of glaucoma  -latanoprost gtt  -cmp/mg++/po--4/cbc/coag now and qam  -pt with hx of DM2 on lantus  -POC glucose q 6 hrs with ISS - maintain glucose 140 -160  -lantus 30 units subq q hs          Critical Care Time: 40minutes   Reviewing data, imaging, discussing with multidisciplinary team, not inclusive of procedures, discussing goals of care with family

## 2019-03-06 NOTE — DISCHARGE NOTE PROVIDER - NSDCCPCAREPLAN_GEN_ALL_CORE_FT
PRINCIPAL DISCHARGE DIAGNOSIS  Problem: NSTEMI (non-ST elevated myocardial infarction)  Assessment and Plan of Treatment: NSTEMI (non-ST elevated myocardial infarction): transfer to [facility] for cardiac catheterization. Continue DAPT, heparin gtt, beta blocker, lasix. ARB was held for FLORIDALMA.      SECONDARY DISCHARGE DIAGNOSES  Problem: Congestive heart failure, unspecified HF chronicity, unspecified heart failure type  Assessment and Plan of Treatment: Acute on chronic CHF: Continue with IV lasix, BIPAP as needed, f/u echocardiogram.    Problem: Acute respiratory distress  Assessment and Plan of Treatment: likely due to flu A + CHF/NSTEMI, continue tamiflu, BDs, NIPPV and medical management for NSTEMI until cardiac catheterization    Problem: Essential hypertension  Assessment and Plan of Treatment: Essential hypertension: continue beta blocker, ARB held for FLORIDALMA    Problem: Type 2 diabetes mellitus  Assessment and Plan of Treatment: Type 2 diabetes mellitus: continue lantus and ISS while hospitalized, monitor blood glucose, hypoglycemia protocol    Problem: Hyperlipidemia  Assessment and Plan of Treatment: Hyperlipidemia: continue statin    Problem: Influenza A  Assessment and Plan of Treatment: Influenza A: complete course of tamiflu PRINCIPAL DISCHARGE DIAGNOSIS  Problem: NSTEMI (non-ST elevated myocardial infarction)  Assessment and Plan of Treatment: NSTEMI (non-ST elevated myocardial infarction): transfer to [facility] for cardiac catheterization. Continue DAPT, heparin gtt, beta blocker, IV lasix PRN. ARB was held for FLORIDALMA.      SECONDARY DISCHARGE DIAGNOSES  Problem: Congestive heart failure, unspecified HF chronicity, unspecified heart failure type  Assessment and Plan of Treatment: Acute on chronic CHF:  Continue with IV lasix PRN, BIPAP (if needed), f/u echocardiogram.    Problem: Acute respiratory distress  Assessment and Plan of Treatment: likely due to flu A + CHF/NSTEMI, continue tamiflu, BDs, NIPPV and medical management for NSTEMI until cardiac catheterization    Problem: Essential hypertension  Assessment and Plan of Treatment: Essential hypertension: continue beta blocker, ARB held for FLORIDALMA; resume ARB in [facility] when able    Problem: Type 2 diabetes mellitus  Assessment and Plan of Treatment: Type 2 diabetes mellitus: continue lantus (lowered dose) and ISS while hospitalized, monitor blood glucose, hypoglycemia protocol; home meds held during hospitalization    Problem: Hyperlipidemia  Assessment and Plan of Treatment: Hyperlipidemia: continue statin    Problem: Influenza A  Assessment and Plan of Treatment: Influenza A: complete course of tamiflu through 3/11/19 PRINCIPAL DISCHARGE DIAGNOSIS  Problem: NSTEMI (non-ST elevated myocardial infarction)  Assessment and Plan of Treatment: NSTEMI (non-ST elevated myocardial infarction): transfer to [facility] for cardiac catheterization. Continue DAPT, heparin gtt, beta blocker, IV lasix PRN. ARB was held for FLORIDALMA.      SECONDARY DISCHARGE DIAGNOSES  Problem: Congestive heart failure, unspecified HF chronicity, unspecified heart failure type  Assessment and Plan of Treatment: Acute HFrEF:  Continue diuresis with lasix PRN, BIPAP (if needed), f/u echocardiogram.    Problem: Acute respiratory distress  Assessment and Plan of Treatment: likely due to flu A + CHF/NSTEMI, continue tamiflu, BDs, NIPPV PRN and medical management for NSTEMI until cardiac catheterization    Problem: Essential hypertension  Assessment and Plan of Treatment: Essential hypertension: continue beta blocker, ARB held for FLORIDALMA; resume ARB in [facility] when able    Problem: Type 2 diabetes mellitus  Assessment and Plan of Treatment: Type 2 diabetes mellitus: continue lantus (lowered dose) and ISS while hospitalized, monitor blood glucose, hypoglycemia protocol; home meds held during hospitalization    Problem: Hyperlipidemia  Assessment and Plan of Treatment: Hyperlipidemia: continue statin    Problem: Influenza A  Assessment and Plan of Treatment: Influenza A: complete course of tamiflu through 3/11/19

## 2019-03-06 NOTE — H&P ADULT - PROBLEM SELECTOR PLAN 4
- pt noted to be flu A (+)  - likely contributor to pt's resp distress  - contact/droplet precs, mgmt per ICU

## 2019-03-06 NOTE — ED PROVIDER NOTE - OBJECTIVE STATEMENT
74yo F h/o htn, dm p/w dyspnea since last night associated w/ chest pressure. denies f/c/n/v/d/cough.

## 2019-03-06 NOTE — CONSULT NOTE ADULT - SUBJECTIVE AND OBJECTIVE BOX
CHIEF COMPLAINT: progressive sob with c/p on exertion    HPI:  73 yr female wit PMHx of HTN, HLD, CAD s/p catherization x3 over 10 yr period, last in 2008. Pt endorses no abnormal findings. Asthma, uterine Ca s/p Rt therapy  and TSH/BSO, Glaucoma who presents today (3/6/19) [facility] hospital from home with progressive worsening SOB with productive cough of clear sputum over past 2 to 3 days accompanied by worsening midsternal chest pain radiating to bilateral jaw over same period relieved with rest. Pt endorses SOB has been progressive over past 6 month period and was meant for cardiac workup with nuclear stress test tomorrow. Pt denies N/V/D/F/C. Pt denies and increase in Lower Extremity edema       In E.D. Pt found to be hypoxic with SPO2 90% on Rm Air with increased WOB and crackles on exam, placed on Bipap therapy, received 40mg lasix IVP. ECG on admission demonstrate LBBB (not new), C.E. with +0.296. In addition pt found to have Influenza A+        Consult and admission to ICU for NSTEMI/ADHF and Flu A+        PAST MEDICAL & SURGICAL HISTORY:  Diabetes mellitus  Hypertension  Hyperlipidemia      FAMILY HISTORY:      SOCIAL HISTORY:  Smoking: [xxx ] Never Smoked [ ] Former Smoker (__ packs x ___ years) [ ] Current Smoker  (__ packs x ___ years)  Substance Use: [xx ] Never Used [ ] Used ____  EtOH Use: social  Marital Status: [ ] Single [ xx]  [ ]  [ ]   Sexual History:   Occupation:  Recent Travel:  Country of Birth:  Advance Directives:    Allergies    No Known Allergies    Intolerances        HOME MEDICATIONS:    REVIEW OF SYSTEMS:  CONSTITUTIONAL: No weakness, fevers or chills  EYES/ENT: No visual changes;  No vertigo or throat pain   NECK: No pain or stiffness  RESPIRATORY: +productive cough, wheezing,nohemoptysis; + shortness of breath  CARDIOVASCULAR: +chest pressure on exertion,no palpitations  GASTROINTESTINAL: No abdominal or epigastric pain. No nausea, vomiting, or hematemesis; No diarrhea or constipation. No melena or hematochezia.  GENITOURINARY: No dysuria, frequency or hematuria  NEUROLOGICAL: No numbness or weakness  SKIN: No itching, rashes      OBJECTIVE:  ICU Vital Signs Last 24 Hrs  T(C): 36.7 (06 Mar 2019 10:08), Max: 36.7 (06 Mar 2019 10:08)  T(F): 98 (06 Mar 2019 10:08), Max: 98 (06 Mar 2019 10:08)  HR: 110 (06 Mar 2019 12:57) (103 - 140)  BP: 157/81 (06 Mar 2019 12:57) (86/50 - 185/79)  BP(mean): --  ABP: --  ABP(mean): --  RR: 16 (06 Mar 2019 12:57) (16 - 32)  SpO2: 98% (06 Mar 2019 12:57) (90% - 100%)    CURRENT VITAL SIGNS:  BP: 144/59; HR: 104; RR:26; SPO2 98% on Bipap 14/7  FIO2 40%    CAPILLARY BLOOD GLUCOSE      POCT Blood Glucose.: 277 mg/dL (06 Mar 2019 12:29)      PHYSICAL EXAM:  GENERAL: NAD, well-groomed, well-developed  HEAD:  Atraumatic, Normocephalic  EYES: EOMI, PERRLA, conjunctiva and sclera clear  ENMT: No oropharyngeal exudates, erythema or lesions,  Moist mucous membranes  NECK: Supple, no cervical lymphadenopathy, no JVD  NERVOUS SYSTEM:  Alert & Oriented X3, CN II-XII intact, 5/5 BUE and BLE motor strength, full sensation to light touch   CHEST/LUNG: Breath sounds auscultation bilaterally; Crackles bases to 1/3 up bilat with end inspiratory and end expiratory wheezes. utilizing Bipap therapy 14/7 40% FIO2. RR 26 with SpVT 500 to 600 cc's  HEART: Regular rate and rhythm; S1/S2 I/VI murmurs, rubs, or gallops  ABDOMEN: Soft, Nontender, Nondistended; Bowel sounds present, Bladder non distended, non palpable  EXTREMITIES: Pulses palpable radial pulses 2+ bilat, DP/PT 1+/1+ bilat, without clubbing,cyanosis. Digits warm to touch with good cap refill < 3 secs. 1+ pitting edema bilat  Skin: warm, dry, intact, normal color, no rash or abnormal lesions,without palpable nodes      HOSPITAL MEDICATIONS:  MEDICATIONS  (STANDING):    MEDICATIONS  (PRN):      LABS:                        13.8   14.85 )-----------( 323      ( 06 Mar 2019 10:55 )             43.8     Hgb Trend: 13.8<--  03-06    136  |  101  |  21  ----------------------------<  370<H>  5.0   |  26  |  1.50<H>    Ca    8.1<L>      06 Mar 2019 10:54    TPro  8.0  /  Alb  3.6  /  TBili  0.4  /  DBili  x   /  AST  32  /  ALT  34  /  AlkPhos  97  03-06    Creatinine Trend: 1.50<--  PT/INR - ( 06 Mar 2019 10:54 )   PT: 13.4 sec;   INR: 1.18 ratio         PTT - ( 06 Mar 2019 10:54 )  PTT:33.4 sec          MICROBIOLOGY:     RADIOLOGY:  [ ] Reviewed and interpreted by me    EKG:        [facility] (ext. [id number])

## 2019-03-06 NOTE — H&P ADULT - PROBLEM SELECTOR PLAN 5
- holding home medications: Glimepiride  - insulin coverage scale w/hypoglycemia protocol, continue Lantus  - Accuchecks AC&HS.  - Diet: Consistent Carbs w/ Evening Snack.  - f/u AM HbA1c.

## 2019-03-06 NOTE — ED PROVIDER NOTE - CARE PLAN
Assessment and plan of treatment:	72yo F h/o htn, dm p/w dyspnea since last night associated w/ chest pressure. denies f/c/n/v/d/cough. Principal Discharge DX:	Acute respiratory distress  Assessment and plan of treatment:	74yo F h/o htn, dm p/w dyspnea since last night associated w/ chest pressure. denies f/c/n/v/d/cough.  Secondary Diagnosis:	Congestive heart failure, unspecified HF chronicity, unspecified heart failure type

## 2019-03-06 NOTE — ED ADULT NURSE NOTE - NSIMPLEMENTINTERV_GEN_ALL_ED
Implemented All Universal Safety Interventions:  [place] to call system. Call bell, personal items and telephone within reach. Instruct patient to call for assistance. Room bathroom lighting operational. Non-slip footwear when patient is off stretcher. Physically safe environment: no spills, clutter or unnecessary equipment. Stretcher in lowest position, wheels locked, appropriate side rails in place.

## 2019-03-06 NOTE — H&P ADULT - PROBLEM SELECTOR PLAN 7
IMPROVE VTE Individual Risk Assessment          RISK                                                          Points  [  ] Previous VTE                                                3  [  ] Thrombophilia                                             2  [  ] Lower limb paralysis                                   2        (unable to hold up >15 seconds)    [  ] Current Cancer                                             2         (within 6 months)  [  ] Immobilization > 24 hrs                              1  [  ] ICU/CCU stay > 24 hours                             1  [ x ] Age > 60                                                         1    IMPROVE VTE Score: 1, pt to be started on full dose Lovenox, heparin gtt IMPROVE VTE Individual Risk Assessment          RISK                                                          Points  [  ] Previous VTE                                                3  [  ] Thrombophilia                                             2  [  ] Lower limb paralysis                                   2        (unable to hold up >15 seconds)    [  ] Current Cancer                                             2         (within 6 months)  [  ] Immobilization > 24 hrs                              1  [  ] ICU/CCU stay > 24 hours                             1  [ x ] Age > 60                                                         1    IMPROVE VTE Score: 1, pt to be started on heparin gtt

## 2019-03-06 NOTE — H&P ADULT - PROBLEM SELECTOR PLAN 2
- pt with EKG showing LBBB, per outpt (Dr. Irizarry) office, this is not new  - however pt now with worsening Chest pressure radiating to b/l jaws and both arms, with elevated trops   - per pt, pt has had 3 caths in past 10 years for abnormal ECGs  - cardio (Dr. Boyce aware. Plan for cath tomorrow.  - trend trops, monitor routine labs on plavix  300 mg , full dose heparin , asa 325 , - pt with EKG showing LBBB, per outpt (Dr. Irizarry) office, this is not new  - however pt now with worsening Chest pressure radiating to b/l jaws and both arms, with elevated trops   - per pt, pt has had 3 caths in past 10 years for abnormal ECGs  - cardio (Dr. Boyce aware. Plan for cath tomorrow.  - trend trops, monitor routine labs

## 2019-03-06 NOTE — H&P ADULT - HISTORY OF PRESENT ILLNESS
Pt is a 74 y/o F with PMHx of HTN, HLD, Type II DM (on Lantus, glimepiride), who presents with worsening SOB and chest pressure over last month. Pt states she has been feeling SOB after walking 50 feet for past month. Pt states chest pressure lies over midline chest, and radiates to both sides of her jaw, and both arms.     Of note pt had previously followed Dr. Christie (cardiology), and has had 3 coronary catheterizations over past 10 years, with most recent cath 2008 for abnormal ECG. Pt states these caths have been unremarkable.    In the ED: VS T 98, , /79 RR 26 90%RA. Labs significant for WBC 14.85, Cr 1.50, glu 370, Ca 8.1, proCal 0.05, POCT 277. Pt was started on BiPAP in ED, with improval in sats to 100%. Pt evaluated by ICU in ED. Pt is a 72 y/o F with PMHx of HTN, HLD, Type II DM (on Lantus, glimepiride), who presents with worsening SOB and chest pressure over last month. Pt states she has been feeling SOB after walking 50 feet for past month. Pt states chest pressure lies over midline chest, and radiates to both sides of her jaw, and both arms.  as per cardio [facility] pe heart gp pt was seen ago in there office     Of note pt had previously followed Dr. Christie (cardiology), and has had 3 coronary catheterizations over past 10 years, with most recent cath 2008 for abnormal ECG. Pt states these caths have been unremarkable.    In the ED: VS T 98, , /79 RR 26 90%RA. Labs significant for WBC 14.85, Cr 1.50, glu 370, Ca 8.1, proCal 0.05, POCT 277. Pt was started on BiPAP in ED, with improval in sats to 100%. Pt evaluated by ICU in ED.

## 2019-03-06 NOTE — H&P ADULT - NSHPREVIEWOFSYSTEMS_GEN_ALL_CORE
Constitutional: denies fever, chills, sweating  HEENT: denies headache, dizziness, or lightheadedness  Respiratory: admits (+) SOB, cough, or wheezing  Cardiovascular: admits (+) chest pressure, midline, radiating to b/l jaw and b/l arms, denies palpitations  Gastrointestinal: denies nausea, vomiting, diarrhea, constipation, abdominal pain, or bloody stools  Genitourinary: denies painful urination, increased frequency, urgency, or bloody urine  Skin/Breast: denies rashes or itching  Musculoskeletal: denies muscle aches, joint swelling, or muscle weakness  Neurologic: denies loss of sensation, numbness, or tingling  ROS negative except as noted above Constitutional: denies fever, chills, sweating  HEENT: denies headache, dizziness, or lightheadedness  Respiratory: admits (+) SOB, cough, or wheezing  Cardiovascular: admits (+) chest pressure, midline, radiating to b/l jaw and b/l arms, denies palpitations  Gastrointestinal: denies nausea, vomiting, diarrhea, constipation, abdominal pain,   Genitourinary: denies painful urination, increased frequency, urgency, or bloody urine  Skin/Breast: denies rashes or itching  Musculoskeletal: denies muscle aches, joint swelling, or muscle weakness  Neurologic: denies loss of sensation, numbness, or tingling  ROS negative except as noted above

## 2019-03-06 NOTE — CONSULT NOTE ADULT - PROBLEM SELECTOR RECOMMENDATION 9
S/p IV lasix and BiPAP .   SOB is improving  Continue with Lasix 40mg IV daily   Monitor I/O and daily weight   Monitor renal function and lytes  Supportive care , treat flu and bronchodilators  for asthma

## 2019-03-06 NOTE — CONSULT NOTE ADULT - ATTENDING COMMENTS
Patient assessed independently from above    74 y/o female from home, PMH HTN, DM2 (on insulin), HLD, well controlled asthma, now admitted with acute hypoxic respiratory failure due to acute pulmonary edema from acute decompensated heart failure, suspected ACS and Influenza A.     Plan:  --Cardiac: start aspirin, Plavix (loaded with 300mg), iv heparin, statin, metoprolol, cardiac cath likely tomorrow (per cardiology), trend [place], check ECHO, hold losartan given mild FLORIDALMA and K of 5  --Pulm: improved with NIV and diuresis, wean BiPAP support, will give a break later, place on q12 Lasix, start BDs  --GI: keep NPO for now  --Renal: monitor Cr, repeat BMP in pm, avoid nephrotoxic meds  --ID: Tamiflu 75mg q12 x 5days   --Endo: start Lantus at a lower dose, will increase dose and add pre-meal Humalog once diet is started  --Eyes: continue latanoprost for glaucoma, requested  to bring in Simbrinza   --Patient critically ill, 45mins spent
Spoke with the patient and  at the bedside , discussed about the pt's present condition and the need for a cardiac cath. She is agreeable for transfer to [facility]. Will make transfer arrangements

## 2019-03-06 NOTE — H&P ADULT - ATTENDING COMMENTS
pt seen and examine see above plan - pt with acute new  chf with nstemi - s/p iv lasix further doing by icu , heparin drip ,  300 mg po Plavix, given asa, statin , plan transfer for cath in am / cardio dr gabbi stephenson aware .

## 2019-03-06 NOTE — ED PROVIDER NOTE - CLINICAL SUMMARY MEDICAL DECISION MAKING FREE TEXT BOX
ekg w/ LBBB no prior to compare, labs w/ elevated bnp, trop, cxr w/ congestion, improved w/ bipap, given iv lasix, cardio consulted, pt admitted for further eval and mgmt

## 2019-03-06 NOTE — H&P ADULT - PROBLEM SELECTOR PLAN 3
- chronic, elevated in ED   - continue home BP meds, hold Beta blocker in acute CHF setting  - mgmt per ICU

## 2019-03-06 NOTE — CONSULT NOTE ADULT - ASSESSMENT
73yr old female with the PMH of  HTN, DM, HLD ,  Asthma,  Uterine cancer who presents with 6 month period of progressive sob with worsening over past 2 to 3 days accompanied by productive cough and retrosternal chest pressure and noted to have elevated troponin ,  LBBB (seen in office visit 2 days ago , h/o abnormal EKG in the past with nomial caths, unclear LBBB is old or recent ) ) , positive Flu A+. She is currently being managed for NSTEMI , Acute CHF and Flu

## 2019-03-06 NOTE — H&P ADULT - ASSESSMENT
Pt is a 74 y/o F with PMHx of HTN, HLD, Type II DM (on Lantus, glimepiride), who presents with worsening SOB and chest pressure over last month, admitted for acute onset CHF and NSTEMI with plan for transfer for cath tomorrow once pt is stabilized. Pt is a 72 y/o F with PMHx of HTN, HLD, Type II DM (on Lantus, glimepiride), who presents with worsening SOB and chest pressure over last month, admitted for acute onset CHF   causing acute hypoxic resp faliure and NSTEMI  with hx lbbb with plan for transfer for cath tomorrow once pt is  resp  status stabilize .

## 2019-03-06 NOTE — DISCHARGE NOTE PROVIDER - CARE PROVIDER_API CALL
Jeovany Bonilla (MD)  Medicine  [address] [address]  [place] [address]  Phone: [phone]  Fax: [phone]  Follow Up Time:     Neville Cooper (DO)  Cardiovascular Disease; Internal Medicine; Interventional Cardiology  [address]  [place] [address]  Phone: [phone]  Fax: [phone]  Follow Up Time:

## 2019-03-06 NOTE — H&P ADULT - FAMILY HISTORY
Father  Still living? Unknown  Family history of type 2 diabetes mellitus, Age at diagnosis: Age Unknown

## 2019-03-06 NOTE — H&P ADULT - NSHPSOCIALHISTORY_GEN_ALL_CORE
Lives with  at home.  Ambulates independently at baseline.  Nonsmoker, denies Etoh use.  Performs ADLs independently.  No flu shot or PNA shot.

## 2019-03-06 NOTE — PHARMACOTHERAPY INTERVENTION NOTE - COMMENTS
Patient prescribed oseltamivir 75mg BID for influenza treatment.  Based on patient's renal function (CrCl = 31.7mL/min), dose should be 30mg Q12. Spoke with NP Kwame Martinez who agreed to dose reduction.  Patient received 75mg today at 1400 - entered remaining doses to start 3/7 0600 for 9 doses. Patient prescribed oseltamivir 75mg BID for influenza treatment.  Based on patient's renal function (CrCl = 31.7mL/min), dose should be 30mg Q12. Spoke with NP Kwame Martinez who agreed to dose reduction.  Patient received 75mg today at 1400 - with patient's impaired renal function and the half life of the medication (normally 10 hrs, with impaired renal function, extended) entered remaining doses to start 3/7 0600 for 9 doses.

## 2019-03-06 NOTE — ED PROVIDER NOTE - PLAN OF CARE
72yo F h/o htn, dm p/w dyspnea since last night associated w/ chest pressure. denies f/c/n/v/d/cough.

## 2019-03-06 NOTE — H&P ADULT - NSHPPHYSICALEXAM_GEN_ALL_CORE
Physical Exam:  General: elderly F in NAD  HEENT: NC/AT, PERRLA, EOMI B/L, on BiPAP  Neck: Supple, nontender, no masses  CV: RRR, +S1/S2, no murmurs, rubs or gallops  Respiratory: diffuse wheezing b/l, mild crackles b/l lung bases  Abdominal: Soft, NT, ND +BSx4  Extremities: No C/C/E, + peripheral pulses  Neurology: AAOx3, nonfocal, CN II-XII grossly intact, sensation intact Physical Exam:  General: elderly F in NAD  HEENT: NC/AT, PERRLA, EOMI B/L, on BiPAP  Neck: Supple, nontender, no masses  CV: RRR, +S1/S2, no murmurs, no tachy   Respiratory: diffuse wheezing b/l, mild crackles b/l lung bases  Abdominal: Soft, NT, ND +BSx4  Extremities: No C/C/E, + peripheral pulses  Neurology: AAOx3, nonfocal, CN II-XII grossly intact, sensation intact  gu intact

## 2019-03-06 NOTE — H&P ADULT - PROBLEM SELECTOR PLAN 1
- admit to ICU  - likely 2/2 component of heart failure, no record of previous ECHO  - pt with SOB, presenting with signs of resp distress, placed on BiPAP in ER due to labored respirations with Os sat 90% on RA. Diffuse wheezing and crackles heard in b/l lung fields.  - s/p 1x dose Lasix IVP in ED, monitor strict I/Os.   - evaluated by ICU (Dr. Hernandez) in ED, will accept to ICU.  - d/w cardio (Dr. Boyce), plan for transfer for cath tomorrow  - monitor routine hemodynamics. Start full dose Lovenox, start heparin gtt  - other mgmt per ICU - admit to ICU acute hypoxic respiratory failure    likely 2/2 component of heart failure, no record of previous ECHO  - pt with SOB, presenting with signs of resp distress, placed on BiPAP in ER due to labored respirations with Os sat 90% on RA. Diffuse wheezing and crackles heard in b/l lung fields.  - s/p 1x dose Lasix IVP in ED, monitor strict I/Os.   - evaluated by ICU (Dr. Hernandez) in ED, will accept to ICU.  - d/w cardio (Dr. gabbi Boyce), plan for transfer for cath tomorrow  - monitor routine hemodynamics. Start iv lasix ,   - other mgmt per ICU

## 2019-03-06 NOTE — ED PROVIDER NOTE - PHYSICAL EXAMINATION
GEN: awake, alert, ill appearing   HENT: atraumatic, normocephalic, LONI, EOMI, no midline instability, oropharynx w/o erythema or exudates, no lymphadenopathy  CV: normal rate and rhythm, S1, S2, no MRG, equal pulses throughout, no JVD, 1+pitting edema to LE   RESP: audible rales/crackles, IWOB, mod to severe resp distress  ABD: soft, nontender, nondistended, no rebound, no guarding, normoactive bowel sounds, no organomegally  MUSCULOSKELETAL: strenght 5/5 x 4, full range of motion, CMS intact   SKIN: normal color, no turgor, no wounds or rash   NEURO: Awake alert oriented x 3, no facial asymmetry, no slurred speech, no pronator drift, moving all extremities  PSYCH: no suicial ideation, no homicidal ideation, no depression, no anxiety, no hallucination

## 2019-03-06 NOTE — ED ADULT NURSE NOTE - OBJECTIVE STATEMENT
Pt received in bed alert and oriented and resting in bed with c/o difficulty breathing and chest pressure which has been getting increasing worse for the last 2 days. AS per Md's orders pt placed on bipap by resp and then IV neel placed and tolerated well. Pt also tolerating bipap very well. Nursing care ongoing and safety maintained. Pt  at bedside.

## 2019-03-07 ENCOUNTER — INPATIENT (INPATIENT)
Facility: HOSPITAL | Age: 74
LOS: 11 days | Discharge: HOME | DRG: 246 | End: 2019-03-19
Attending: INTERNAL MEDICINE | Admitting: HOSPITALIST
Payer: MEDICARE

## 2019-03-07 ENCOUNTER — TRANSCRIPTION ENCOUNTER (OUTPATIENT)
Age: 74
End: 2019-03-07

## 2019-03-07 VITALS
RESPIRATION RATE: 17 BRPM | HEART RATE: 91 BPM | SYSTOLIC BLOOD PRESSURE: 135 MMHG | OXYGEN SATURATION: 96 % | DIASTOLIC BLOOD PRESSURE: 70 MMHG | TEMPERATURE: 98 F

## 2019-03-07 VITALS
RESPIRATION RATE: 24 BRPM | OXYGEN SATURATION: 96 % | SYSTOLIC BLOOD PRESSURE: 139 MMHG | DIASTOLIC BLOOD PRESSURE: 104 MMHG | HEART RATE: 87 BPM

## 2019-03-07 DIAGNOSIS — J45.901 UNSPECIFIED ASTHMA WITH (ACUTE) EXACERBATION: ICD-10-CM

## 2019-03-07 DIAGNOSIS — I10 ESSENTIAL (PRIMARY) HYPERTENSION: ICD-10-CM

## 2019-03-07 DIAGNOSIS — I50.9 HEART FAILURE, UNSPECIFIED: ICD-10-CM

## 2019-03-07 DIAGNOSIS — I21.4 NON-ST ELEVATION (NSTEMI) MYOCARDIAL INFARCTION: ICD-10-CM

## 2019-03-07 DIAGNOSIS — J96.01 ACUTE RESPIRATORY FAILURE WITH HYPOXIA: ICD-10-CM

## 2019-03-07 DIAGNOSIS — Z90.710 ACQUIRED ABSENCE OF BOTH CERVIX AND UTERUS: Chronic | ICD-10-CM

## 2019-03-07 DIAGNOSIS — Z29.9 ENCOUNTER FOR PROPHYLACTIC MEASURES, UNSPECIFIED: ICD-10-CM

## 2019-03-07 DIAGNOSIS — E11.9 TYPE 2 DIABETES MELLITUS WITHOUT COMPLICATIONS: ICD-10-CM

## 2019-03-07 DIAGNOSIS — J10.1 INFLUENZA DUE TO OTHER IDENTIFIED INFLUENZA VIRUS WITH OTHER RESPIRATORY MANIFESTATIONS: ICD-10-CM

## 2019-03-07 DIAGNOSIS — N17.9 ACUTE KIDNEY FAILURE, UNSPECIFIED: ICD-10-CM

## 2019-03-07 LAB
ALBUMIN SERPL ELPH-MCNC: 3.4 G/DL — SIGNIFICANT CHANGE UP (ref 3.3–5)
ALP SERPL-CCNC: 83 U/L — SIGNIFICANT CHANGE UP (ref 40–120)
ALT FLD-CCNC: 32 U/L — SIGNIFICANT CHANGE UP (ref 12–78)
ANION GAP SERPL CALC-SCNC: 8 MMOL/L — SIGNIFICANT CHANGE UP (ref 5–17)
ANION GAP SERPL CALC-SCNC: 8 MMOL/L — SIGNIFICANT CHANGE UP (ref 5–17)
APTT BLD: 48.9 SEC — HIGH (ref 28.5–37)
APTT BLD: 57 SEC — HIGH (ref 28.5–37)
APTT BLD: 97.2 SEC — HIGH (ref 27.5–36.3)
AST SERPL-CCNC: 64 U/L — HIGH (ref 15–37)
BASOPHILS # BLD AUTO: 0.04 K/UL — SIGNIFICANT CHANGE UP (ref 0–0.2)
BASOPHILS NFR BLD AUTO: 0.4 % — SIGNIFICANT CHANGE UP (ref 0–2)
BILIRUB DIRECT SERPL-MCNC: 0.1 MG/DL — SIGNIFICANT CHANGE UP (ref 0.05–0.2)
BILIRUB INDIRECT FLD-MCNC: 0.2 MG/DL — SIGNIFICANT CHANGE UP (ref 0.2–1)
BILIRUB SERPL-MCNC: 0.3 MG/DL — SIGNIFICANT CHANGE UP (ref 0.2–1.2)
BUN SERPL-MCNC: 31 MG/DL — HIGH (ref 7–23)
BUN SERPL-MCNC: 34 MG/DL — HIGH (ref 7–23)
CALCIUM SERPL-MCNC: 8.6 MG/DL — SIGNIFICANT CHANGE UP (ref 8.5–10.1)
CALCIUM SERPL-MCNC: 8.9 MG/DL — SIGNIFICANT CHANGE UP (ref 8.5–10.1)
CHLORIDE SERPL-SCNC: 101 MMOL/L — SIGNIFICANT CHANGE UP (ref 96–108)
CHLORIDE SERPL-SCNC: 99 MMOL/L — SIGNIFICANT CHANGE UP (ref 96–108)
CK MB BLD-MCNC: 0.6 % — SIGNIFICANT CHANGE UP (ref 0–3.5)
CK MB BLD-MCNC: 1.4 % — SIGNIFICANT CHANGE UP (ref 0–3.5)
CK MB CFR SERPL CALC: 5.6 NG/ML — HIGH (ref 0–3.6)
CK MB CFR SERPL CALC: 8.8 NG/ML — HIGH (ref 0–3.6)
CK SERPL-CCNC: 646 U/L — HIGH (ref 26–192)
CK SERPL-CCNC: 863 U/L — HIGH (ref 26–192)
CO2 SERPL-SCNC: 32 MMOL/L — HIGH (ref 22–31)
CO2 SERPL-SCNC: 33 MMOL/L — HIGH (ref 22–31)
CREAT SERPL-MCNC: 1.4 MG/DL — HIGH (ref 0.5–1.3)
CREAT SERPL-MCNC: 1.5 MG/DL — HIGH (ref 0.5–1.3)
EOSINOPHIL # BLD AUTO: 0.02 K/UL — SIGNIFICANT CHANGE UP (ref 0–0.5)
EOSINOPHIL NFR BLD AUTO: 0.2 % — SIGNIFICANT CHANGE UP (ref 0–6)
GLUCOSE SERPL-MCNC: 48 MG/DL — LOW (ref 70–99)
GLUCOSE SERPL-MCNC: 79 MG/DL — SIGNIFICANT CHANGE UP (ref 70–99)
HBA1C BLD-MCNC: 6.8 % — HIGH (ref 4–5.6)
HCT VFR BLD CALC: 42.2 % — SIGNIFICANT CHANGE UP (ref 34.5–45)
HCV AB S/CO SERPL IA: 0.15 S/CO — SIGNIFICANT CHANGE UP (ref 0–0.79)
HCV AB SERPL-IMP: SIGNIFICANT CHANGE UP
HGB BLD-MCNC: 13.4 G/DL — SIGNIFICANT CHANGE UP (ref 11.5–15.5)
IMM GRANULOCYTES NFR BLD AUTO: 0.3 % — SIGNIFICANT CHANGE UP (ref 0–1.5)
LYMPHOCYTES # BLD AUTO: 1.87 K/UL — SIGNIFICANT CHANGE UP (ref 1–3.3)
LYMPHOCYTES # BLD AUTO: 17.3 % — SIGNIFICANT CHANGE UP (ref 13–44)
MAGNESIUM SERPL-MCNC: 2.1 MG/DL — SIGNIFICANT CHANGE UP (ref 1.6–2.6)
MCHC RBC-ENTMCNC: 29.1 PG — SIGNIFICANT CHANGE UP (ref 27–34)
MCHC RBC-ENTMCNC: 31.8 GM/DL — LOW (ref 32–36)
MCV RBC AUTO: 91.7 FL — SIGNIFICANT CHANGE UP (ref 80–100)
MONOCYTES # BLD AUTO: 1.6 K/UL — HIGH (ref 0–0.9)
MONOCYTES NFR BLD AUTO: 14.8 % — HIGH (ref 2–14)
NEUTROPHILS # BLD AUTO: 7.25 K/UL — SIGNIFICANT CHANGE UP (ref 1.8–7.4)
NEUTROPHILS NFR BLD AUTO: 67 % — SIGNIFICANT CHANGE UP (ref 43–77)
NRBC # BLD: 0 /100 WBCS — SIGNIFICANT CHANGE UP (ref 0–0)
PHOSPHATE SERPL-MCNC: 4.7 MG/DL — HIGH (ref 2.5–4.5)
PLATELET # BLD AUTO: 281 K/UL — SIGNIFICANT CHANGE UP (ref 150–400)
POTASSIUM SERPL-MCNC: 3.8 MMOL/L — SIGNIFICANT CHANGE UP (ref 3.5–5.3)
POTASSIUM SERPL-MCNC: 4.2 MMOL/L — SIGNIFICANT CHANGE UP (ref 3.5–5.3)
POTASSIUM SERPL-SCNC: 3.8 MMOL/L — SIGNIFICANT CHANGE UP (ref 3.5–5.3)
POTASSIUM SERPL-SCNC: 4.2 MMOL/L — SIGNIFICANT CHANGE UP (ref 3.5–5.3)
PROT SERPL-MCNC: 7.9 G/DL — SIGNIFICANT CHANGE UP (ref 6–8.3)
RBC # BLD: 4.6 M/UL — SIGNIFICANT CHANGE UP (ref 3.8–5.2)
RBC # FLD: 13.7 % — SIGNIFICANT CHANGE UP (ref 10.3–14.5)
SODIUM SERPL-SCNC: 139 MMOL/L — SIGNIFICANT CHANGE UP (ref 135–145)
SODIUM SERPL-SCNC: 142 MMOL/L — SIGNIFICANT CHANGE UP (ref 135–145)
TROPONIN I SERPL-MCNC: 3.95 NG/ML — HIGH (ref 0.01–0.04)
TROPONIN I SERPL-MCNC: 6.02 NG/ML — HIGH (ref 0.01–0.04)
WBC # BLD: 10.81 K/UL — HIGH (ref 3.8–10.5)
WBC # FLD AUTO: 10.81 K/UL — HIGH (ref 3.8–10.5)

## 2019-03-07 PROCEDURE — 99223 1ST HOSP IP/OBS HIGH 75: CPT | Mod: GC

## 2019-03-07 PROCEDURE — 83735 ASSAY OF MAGNESIUM: CPT

## 2019-03-07 PROCEDURE — 87040 BLOOD CULTURE FOR BACTERIA: CPT

## 2019-03-07 PROCEDURE — 84145 PROCALCITONIN (PCT): CPT

## 2019-03-07 PROCEDURE — 82550 ASSAY OF CK (CPK): CPT

## 2019-03-07 PROCEDURE — 36415 COLL VENOUS BLD VENIPUNCTURE: CPT

## 2019-03-07 PROCEDURE — 99233 SBSQ HOSP IP/OBS HIGH 50: CPT

## 2019-03-07 PROCEDURE — 99231 SBSQ HOSP IP/OBS SF/LOW 25: CPT

## 2019-03-07 PROCEDURE — 87631 RESP VIRUS 3-5 TARGETS: CPT

## 2019-03-07 PROCEDURE — 99221 1ST HOSP IP/OBS SF/LOW 40: CPT

## 2019-03-07 PROCEDURE — 80076 HEPATIC FUNCTION PANEL: CPT

## 2019-03-07 PROCEDURE — 71045 X-RAY EXAM CHEST 1 VIEW: CPT

## 2019-03-07 PROCEDURE — 96374 THER/PROPH/DIAG INJ IV PUSH: CPT

## 2019-03-07 PROCEDURE — 94664 DEMO&/EVAL PT USE INHALER: CPT

## 2019-03-07 PROCEDURE — 93005 ELECTROCARDIOGRAM TRACING: CPT

## 2019-03-07 PROCEDURE — 85730 THROMBOPLASTIN TIME PARTIAL: CPT

## 2019-03-07 PROCEDURE — 80053 COMPREHEN METABOLIC PANEL: CPT

## 2019-03-07 PROCEDURE — 82553 CREATINE MB FRACTION: CPT

## 2019-03-07 PROCEDURE — 80048 BASIC METABOLIC PNL TOTAL CA: CPT

## 2019-03-07 PROCEDURE — 82962 GLUCOSE BLOOD TEST: CPT

## 2019-03-07 PROCEDURE — 94640 AIRWAY INHALATION TREATMENT: CPT

## 2019-03-07 PROCEDURE — 84484 ASSAY OF TROPONIN QUANT: CPT

## 2019-03-07 PROCEDURE — 83036 HEMOGLOBIN GLYCOSYLATED A1C: CPT

## 2019-03-07 PROCEDURE — 85610 PROTHROMBIN TIME: CPT

## 2019-03-07 PROCEDURE — 86803 HEPATITIS C AB TEST: CPT

## 2019-03-07 PROCEDURE — 94760 N-INVAS EAR/PLS OXIMETRY 1: CPT

## 2019-03-07 PROCEDURE — 85027 COMPLETE CBC AUTOMATED: CPT

## 2019-03-07 PROCEDURE — 93010 ELECTROCARDIOGRAM REPORT: CPT

## 2019-03-07 PROCEDURE — 99291 CRITICAL CARE FIRST HOUR: CPT | Mod: 25

## 2019-03-07 PROCEDURE — 99233 SBSQ HOSP IP/OBS HIGH 50: CPT | Mod: GC

## 2019-03-07 PROCEDURE — 93306 TTE W/DOPPLER COMPLETE: CPT

## 2019-03-07 PROCEDURE — 83880 ASSAY OF NATRIURETIC PEPTIDE: CPT

## 2019-03-07 PROCEDURE — 84100 ASSAY OF PHOSPHORUS: CPT

## 2019-03-07 RX ORDER — INSULIN GLARGINE 100 [IU]/ML
68 INJECTION, SOLUTION SUBCUTANEOUS
Qty: 0 | Refills: 0 | COMMUNITY

## 2019-03-07 RX ORDER — DEXTROSE 50 % IN WATER 50 %
15 SYRINGE (ML) INTRAVENOUS ONCE
Qty: 0 | Refills: 0 | Status: DISCONTINUED | OUTPATIENT
Start: 2019-03-07 | End: 2019-03-19

## 2019-03-07 RX ORDER — CLOPIDOGREL BISULFATE 75 MG/1
1 TABLET, FILM COATED ORAL
Qty: 0 | Refills: 0 | COMMUNITY
Start: 2019-03-07

## 2019-03-07 RX ORDER — METOPROLOL TARTRATE 50 MG
1 TABLET ORAL
Qty: 0 | Refills: 0 | COMMUNITY
Start: 2019-03-07

## 2019-03-07 RX ORDER — BRIMONIDINE TARTRATE 2 MG/MG
1 SOLUTION/ DROPS OPHTHALMIC
Qty: 0 | Refills: 0 | Status: DISCONTINUED | OUTPATIENT
Start: 2019-03-07 | End: 2019-03-19

## 2019-03-07 RX ORDER — INSULIN GLARGINE 100 [IU]/ML
10 INJECTION, SOLUTION SUBCUTANEOUS
Qty: 0 | Refills: 0 | DISCHARGE
Start: 2019-03-07

## 2019-03-07 RX ORDER — SODIUM CHLORIDE 9 MG/ML
1000 INJECTION, SOLUTION INTRAVENOUS
Qty: 0 | Refills: 0 | Status: DISCONTINUED | OUTPATIENT
Start: 2019-03-07 | End: 2019-03-07

## 2019-03-07 RX ORDER — SOLIFENACIN SUCCINATE 10 MG/1
1 TABLET ORAL
Qty: 0 | Refills: 0 | COMMUNITY

## 2019-03-07 RX ORDER — HEPARIN SODIUM 5000 [USP'U]/ML
650 INJECTION INTRAVENOUS; SUBCUTANEOUS
Qty: 25000 | Refills: 0 | Status: DISCONTINUED | OUTPATIENT
Start: 2019-03-07 | End: 2019-03-09

## 2019-03-07 RX ORDER — HEPARIN SODIUM 5000 [USP'U]/ML
4700 INJECTION INTRAVENOUS; SUBCUTANEOUS EVERY 6 HOURS
Qty: 0 | Refills: 0 | Status: DISCONTINUED | OUTPATIENT
Start: 2019-03-07 | End: 2019-03-09

## 2019-03-07 RX ORDER — MONTELUKAST 4 MG/1
1 TABLET, CHEWABLE ORAL
Qty: 0 | Refills: 0 | COMMUNITY

## 2019-03-07 RX ORDER — DEXTROSE 50 % IN WATER 50 %
25 SYRINGE (ML) INTRAVENOUS ONCE
Qty: 0 | Refills: 0 | Status: DISCONTINUED | OUTPATIENT
Start: 2019-03-07 | End: 2019-03-07

## 2019-03-07 RX ORDER — ATORVASTATIN CALCIUM 80 MG/1
1 TABLET, FILM COATED ORAL
Qty: 0 | Refills: 0 | COMMUNITY

## 2019-03-07 RX ORDER — LOSARTAN POTASSIUM 100 MG/1
1 TABLET, FILM COATED ORAL
Qty: 0 | Refills: 0 | COMMUNITY

## 2019-03-07 RX ORDER — HEPARIN SODIUM 5000 [USP'U]/ML
0 INJECTION INTRAVENOUS; SUBCUTANEOUS
Qty: 0 | Refills: 0 | DISCHARGE
Start: 2019-03-07

## 2019-03-07 RX ORDER — METOPROLOL TARTRATE 50 MG
25 TABLET ORAL EVERY 6 HOURS
Qty: 0 | Refills: 0 | Status: DISCONTINUED | OUTPATIENT
Start: 2019-03-07 | End: 2019-03-07

## 2019-03-07 RX ORDER — GLIMEPIRIDE 1 MG
1 TABLET ORAL
Qty: 0 | Refills: 0 | COMMUNITY

## 2019-03-07 RX ORDER — INSULIN GLARGINE 100 [IU]/ML
10 INJECTION, SOLUTION SUBCUTANEOUS AT BEDTIME
Qty: 0 | Refills: 0 | Status: DISCONTINUED | OUTPATIENT
Start: 2019-03-07 | End: 2019-03-07

## 2019-03-07 RX ORDER — GLUCAGON INJECTION, SOLUTION 0.5 MG/.1ML
1 INJECTION, SOLUTION SUBCUTANEOUS ONCE
Qty: 0 | Refills: 0 | Status: DISCONTINUED | OUTPATIENT
Start: 2019-03-07 | End: 2019-03-07

## 2019-03-07 RX ORDER — INSULIN LISPRO 100/ML
0 VIAL (ML) SUBCUTANEOUS
Qty: 0 | Refills: 0 | COMMUNITY
Start: 2019-03-07

## 2019-03-07 RX ORDER — INSULIN LISPRO 100/ML
VIAL (ML) SUBCUTANEOUS AT BEDTIME
Qty: 0 | Refills: 0 | Status: DISCONTINUED | OUTPATIENT
Start: 2019-03-07 | End: 2019-03-08

## 2019-03-07 RX ORDER — DORZOLAMIDE HYDROCHLORIDE 20 MG/ML
1 SOLUTION/ DROPS OPHTHALMIC THREE TIMES A DAY
Qty: 0 | Refills: 0 | Status: DISCONTINUED | OUTPATIENT
Start: 2019-03-07 | End: 2019-03-19

## 2019-03-07 RX ORDER — CLOPIDOGREL BISULFATE 75 MG/1
1 TABLET, FILM COATED ORAL
Qty: 0 | Refills: 0 | DISCHARGE
Start: 2019-03-07

## 2019-03-07 RX ORDER — METOPROLOL TARTRATE 50 MG
50 TABLET ORAL
Qty: 0 | Refills: 0 | Status: COMPLETED | OUTPATIENT
Start: 2019-03-07 | End: 2019-03-10

## 2019-03-07 RX ORDER — ASPIRIN/CALCIUM CARB/MAGNESIUM 324 MG
1 TABLET ORAL
Qty: 0 | Refills: 0 | DISCHARGE
Start: 2019-03-07

## 2019-03-07 RX ORDER — INSULIN LISPRO 100/ML
VIAL (ML) SUBCUTANEOUS AT BEDTIME
Qty: 0 | Refills: 0 | Status: DISCONTINUED | OUTPATIENT
Start: 2019-03-07 | End: 2019-03-07

## 2019-03-07 RX ORDER — METFORMIN HYDROCHLORIDE 850 MG/1
1 TABLET ORAL
Qty: 0 | Refills: 0 | COMMUNITY

## 2019-03-07 RX ORDER — IPRATROPIUM/ALBUTEROL SULFATE 18-103MCG
3 AEROSOL WITH ADAPTER (GRAM) INHALATION EVERY 4 HOURS
Qty: 0 | Refills: 0 | Status: DISCONTINUED | OUTPATIENT
Start: 2019-03-07 | End: 2019-03-13

## 2019-03-07 RX ORDER — ASPIRIN/CALCIUM CARB/MAGNESIUM 324 MG
1 TABLET ORAL
Qty: 0 | Refills: 0 | COMMUNITY
Start: 2019-03-07

## 2019-03-07 RX ORDER — ATORVASTATIN CALCIUM 80 MG/1
40 TABLET, FILM COATED ORAL AT BEDTIME
Qty: 0 | Refills: 0 | Status: DISCONTINUED | OUTPATIENT
Start: 2019-03-07 | End: 2019-03-19

## 2019-03-07 RX ORDER — LATANOPROST 0.05 MG/ML
1 SOLUTION/ DROPS OPHTHALMIC; TOPICAL AT BEDTIME
Qty: 0 | Refills: 0 | Status: DISCONTINUED | OUTPATIENT
Start: 2019-03-07 | End: 2019-03-19

## 2019-03-07 RX ORDER — INSULIN GLARGINE 100 [IU]/ML
10 INJECTION, SOLUTION SUBCUTANEOUS
Qty: 0 | Refills: 0 | COMMUNITY
Start: 2019-03-07

## 2019-03-07 RX ORDER — INSULIN LISPRO 100/ML
VIAL (ML) SUBCUTANEOUS
Qty: 0 | Refills: 0 | Status: DISCONTINUED | OUTPATIENT
Start: 2019-03-07 | End: 2019-03-08

## 2019-03-07 RX ORDER — DEXTROSE 50 % IN WATER 50 %
25 SYRINGE (ML) INTRAVENOUS ONCE
Qty: 0 | Refills: 0 | Status: DISCONTINUED | OUTPATIENT
Start: 2019-03-07 | End: 2019-03-19

## 2019-03-07 RX ORDER — LATANOPROST 0.05 MG/ML
1 SOLUTION/ DROPS OPHTHALMIC; TOPICAL
Qty: 0 | Refills: 0 | DISCHARGE
Start: 2019-03-07

## 2019-03-07 RX ORDER — OXYBUTYNIN CHLORIDE 5 MG
5 TABLET ORAL
Qty: 0 | Refills: 0 | Status: DISCONTINUED | OUTPATIENT
Start: 2019-03-07 | End: 2019-03-08

## 2019-03-07 RX ORDER — ASPIRIN/CALCIUM CARB/MAGNESIUM 324 MG
1 TABLET ORAL
Qty: 0 | Refills: 0 | COMMUNITY

## 2019-03-07 RX ORDER — HEPARIN SODIUM 5000 [USP'U]/ML
0 INJECTION INTRAVENOUS; SUBCUTANEOUS
Qty: 0 | Refills: 0 | COMMUNITY
Start: 2019-03-07

## 2019-03-07 RX ORDER — DEXTROSE 50 % IN WATER 50 %
12.5 SYRINGE (ML) INTRAVENOUS ONCE
Qty: 0 | Refills: 0 | Status: DISCONTINUED | OUTPATIENT
Start: 2019-03-07 | End: 2019-03-07

## 2019-03-07 RX ORDER — SODIUM CHLORIDE 9 MG/ML
1000 INJECTION, SOLUTION INTRAVENOUS
Qty: 0 | Refills: 0 | Status: DISCONTINUED | OUTPATIENT
Start: 2019-03-08 | End: 2019-03-07

## 2019-03-07 RX ORDER — DEXTROSE 50 % IN WATER 50 %
15 SYRINGE (ML) INTRAVENOUS ONCE
Qty: 0 | Refills: 0 | Status: DISCONTINUED | OUTPATIENT
Start: 2019-03-07 | End: 2019-03-07

## 2019-03-07 RX ORDER — SODIUM CHLORIDE 9 MG/ML
1000 INJECTION, SOLUTION INTRAVENOUS
Qty: 0 | Refills: 0 | Status: DISCONTINUED | OUTPATIENT
Start: 2019-03-07 | End: 2019-03-19

## 2019-03-07 RX ORDER — GLUCAGON INJECTION, SOLUTION 0.5 MG/.1ML
1 INJECTION, SOLUTION SUBCUTANEOUS ONCE
Qty: 0 | Refills: 0 | Status: DISCONTINUED | OUTPATIENT
Start: 2019-03-07 | End: 2019-03-19

## 2019-03-07 RX ORDER — ATORVASTATIN CALCIUM 80 MG/1
1 TABLET, FILM COATED ORAL
Qty: 0 | Refills: 0 | DISCHARGE
Start: 2019-03-07

## 2019-03-07 RX ORDER — INSULIN LISPRO 100/ML
0 VIAL (ML) SUBCUTANEOUS
Qty: 0 | Refills: 0 | DISCHARGE
Start: 2019-03-07

## 2019-03-07 RX ORDER — SITAGLIPTIN 50 MG/1
1 TABLET, FILM COATED ORAL
Qty: 0 | Refills: 0 | COMMUNITY

## 2019-03-07 RX ORDER — INSULIN LISPRO 100/ML
VIAL (ML) SUBCUTANEOUS
Qty: 0 | Refills: 0 | Status: DISCONTINUED | OUTPATIENT
Start: 2019-03-07 | End: 2019-03-07

## 2019-03-07 RX ORDER — BISOPROLOL FUMARATE 10 MG/1
0.5 TABLET, FILM COATED ORAL
Qty: 0 | Refills: 0 | COMMUNITY

## 2019-03-07 RX ORDER — ASPIRIN/CALCIUM CARB/MAGNESIUM 324 MG
81 TABLET ORAL DAILY
Qty: 0 | Refills: 0 | Status: DISCONTINUED | OUTPATIENT
Start: 2019-03-07 | End: 2019-03-07

## 2019-03-07 RX ORDER — INSULIN LISPRO 100/ML
VIAL (ML) SUBCUTANEOUS EVERY 4 HOURS
Qty: 0 | Refills: 0 | Status: DISCONTINUED | OUTPATIENT
Start: 2019-03-07 | End: 2019-03-07

## 2019-03-07 RX ORDER — METOPROLOL TARTRATE 50 MG
1 TABLET ORAL
Qty: 0 | Refills: 0 | DISCHARGE
Start: 2019-03-07

## 2019-03-07 RX ORDER — DEXTROSE 50 % IN WATER 50 %
12.5 SYRINGE (ML) INTRAVENOUS ONCE
Qty: 0 | Refills: 0 | Status: DISCONTINUED | OUTPATIENT
Start: 2019-03-07 | End: 2019-03-19

## 2019-03-07 RX ORDER — CLOPIDOGREL BISULFATE 75 MG/1
75 TABLET, FILM COATED ORAL DAILY
Qty: 0 | Refills: 0 | Status: DISCONTINUED | OUTPATIENT
Start: 2019-03-08 | End: 2019-03-12

## 2019-03-07 RX ORDER — BISOPROLOL FUMARATE 10 MG/1
2.5 TABLET, FILM COATED ORAL
Qty: 0 | Refills: 0 | COMMUNITY

## 2019-03-07 RX ORDER — DEXTROSE 50 % IN WATER 50 %
15 SYRINGE (ML) INTRAVENOUS ONCE
Qty: 0 | Refills: 0 | Status: COMPLETED | OUTPATIENT
Start: 2019-03-07 | End: 2019-03-07

## 2019-03-07 RX ORDER — IPRATROPIUM/ALBUTEROL SULFATE 18-103MCG
3 AEROSOL WITH ADAPTER (GRAM) INHALATION
Qty: 0 | Refills: 0 | DISCHARGE
Start: 2019-03-07

## 2019-03-07 RX ORDER — CLOPIDOGREL BISULFATE 75 MG/1
75 TABLET, FILM COATED ORAL DAILY
Qty: 0 | Refills: 0 | Status: DISCONTINUED | OUTPATIENT
Start: 2019-03-07 | End: 2019-03-07

## 2019-03-07 RX ORDER — ALBUTEROL 90 UG/1
1 AEROSOL, METERED ORAL EVERY 4 HOURS
Qty: 0 | Refills: 0 | Status: DISCONTINUED | OUTPATIENT
Start: 2019-03-07 | End: 2019-03-12

## 2019-03-07 RX ORDER — ASPIRIN/CALCIUM CARB/MAGNESIUM 324 MG
81 TABLET ORAL DAILY
Qty: 0 | Refills: 0 | Status: DISCONTINUED | OUTPATIENT
Start: 2019-03-08 | End: 2019-03-19

## 2019-03-07 RX ADMIN — HEPARIN SODIUM 650 UNIT(S)/HR: 5000 INJECTION INTRAVENOUS; SUBCUTANEOUS at 11:17

## 2019-03-07 RX ADMIN — ATORVASTATIN CALCIUM 40 MILLIGRAM(S): 80 TABLET, FILM COATED ORAL at 23:51

## 2019-03-07 RX ADMIN — Medication 3 MILLILITER(S): at 07:52

## 2019-03-07 RX ADMIN — HEPARIN SODIUM 650 UNIT(S)/HR: 5000 INJECTION INTRAVENOUS; SUBCUTANEOUS at 17:27

## 2019-03-07 RX ADMIN — LATANOPROST 1 DROP(S): 0.05 SOLUTION/ DROPS OPHTHALMIC; TOPICAL at 23:51

## 2019-03-07 RX ADMIN — Medication 25 MILLIGRAM(S): at 05:51

## 2019-03-07 RX ADMIN — Medication 30 MILLIGRAM(S): at 06:15

## 2019-03-07 RX ADMIN — Medication 3 MILLILITER(S): at 13:34

## 2019-03-07 RX ADMIN — HEPARIN SODIUM 500 UNIT(S)/HR: 5000 INJECTION INTRAVENOUS; SUBCUTANEOUS at 04:55

## 2019-03-07 RX ADMIN — Medication 50 MILLIGRAM(S): at 23:51

## 2019-03-07 RX ADMIN — Medication 25 MILLIGRAM(S): at 13:36

## 2019-03-07 RX ADMIN — Medication 3 MILLILITER(S): at 02:12

## 2019-03-07 RX ADMIN — SODIUM CHLORIDE 75 MILLILITER(S): 9 INJECTION, SOLUTION INTRAVENOUS at 08:25

## 2019-03-07 RX ADMIN — DORZOLAMIDE HYDROCHLORIDE 1 DROP(S): 20 SOLUTION/ DROPS OPHTHALMIC at 23:52

## 2019-03-07 RX ADMIN — Medication 30 MILLIGRAM(S): at 17:08

## 2019-03-07 RX ADMIN — HEPARIN SODIUM 650 UNIT(S)/HR: 5000 INJECTION INTRAVENOUS; SUBCUTANEOUS at 23:53

## 2019-03-07 RX ADMIN — Medication 40 MILLIGRAM(S): at 05:51

## 2019-03-07 RX ADMIN — HEPARIN SODIUM 0 UNIT(S)/HR: 5000 INJECTION INTRAVENOUS; SUBCUTANEOUS at 03:53

## 2019-03-07 RX ADMIN — Medication 81 MILLIGRAM(S): at 11:19

## 2019-03-07 RX ADMIN — CLOPIDOGREL BISULFATE 75 MILLIGRAM(S): 75 TABLET, FILM COATED ORAL at 11:19

## 2019-03-07 RX ADMIN — Medication 25 MILLIGRAM(S): at 17:08

## 2019-03-07 RX ADMIN — Medication 15 GRAM(S): at 08:36

## 2019-03-07 NOTE — H&P ADULT - HISTORY OF PRESENT ILLNESS
72yo F with PMHx of HTN, HLD, Type II DM (on Lantus, glimepiride), who presents with worsening SOB and chest pressure over last month found ot have NSTEMI and flu. 72yo F with PMHx of HTN, HLD, DM2 (on Lantus, glimepiride), asthma, glaucoma, ovarian cancer s/p MAYNOR presents as transfer from [facility] for NSTEMI, acute decompensated heart failure, and influenza A. The past 6 months pt has had chest pressure and SOB when she ambulates. The CP is a pressure pain, mid chest that radiates to b/l jaw and arms, presents when she walks and resolves at rest. She made an appt with a cardiologist, but they could only fit her in for an appt in a month. Her symptoms worsened to the point that getting dressed or putting on shoes would cause dizziness and SOB so she came to the ED. Pt has slept sitting up or else she feels dizzy while flat. Denies hx MI or CHF diagnosis. She developed a dry cough a week ago and noticed a sore throat today. At OSH she was found to have NSTEMI with trop-I that peaked at 6.02, EKG showed old LBBB, acute decompensated heart failure requiring BIPAP with pBNP 5803, and influenza A positive. She was monitored in the ICU. She was treated with ASA/plavix load, heparin drip. ARB held for FLORIDALMA. She was diuresed with lasix IV. TTE showed LVEF 25-30%, wall motion abnormalities, elevated filling pressures, mild MR, mod-severe AS, normal RV function, mild TR. FS down to 48 today while pt was NPO. Transferred to [facility] for cardiac cath. Currently pt denies CP, she feels wheezy. Denies F/C, N/V/D, hematochezia, melena, dysuria, LE edema, abd distension. She's had 3 cardiac catheterizations in the past, the last being 2008, and all have been reportedly clean. 72yo F with PMHx of HTN, HLD, CAD, DM2 (on Lantus, glimepiride), asthma, glaucoma, ovarian cancer s/p RT and MAYNOR-BSO presents as transfer from [facility] for NSTEMI, acute decompensated heart failure, FLORIDALMA, and asthma exacerbation 2/2 influenza A. The past 6 months pt has had chest pressure and SOB when she ambulates. The CP is a pressure pain, mid chest that radiates to b/l jaw and arms, presents when she walks and resolves at rest. She made an appt with a cardiologist, but they could only fit her in for an appt in a month. Her symptoms worsened to the point that getting dressed or putting on shoes would cause dizziness and SOB so she came to the ED. Pt has slept sitting up or else she feels dizzy while flat. Denies hx MI or CHF diagnosis. She developed a dry cough a week ago and noticed a sore throat today. At OSH she was found to have NSTEMI with trop-I that peaked at 6.02, EKG showed old LBBB, acute decompensated heart failure requiring BIPAP with pBNP 5803, and influenza A positive. She was monitored in the ICU. She was treated with ASA/plavix load, heparin drip. ARB held for FLORIDALMA. She was diuresed with lasix IV. TTE showed LVEF 25-30%, wall motion abnormalities, elevated filling pressures, mild MR, mod-severe AS, normal RV function, mild TR. FS down to 48 today while pt was NPO. Transferred to [facility] for cardiac cath. Currently pt denies CP, she feels wheezy. Denies F/C, N/V/D, hematochezia, melena, dysuria, LE edema, abd distension. She's had 3 cardiac catheterizations in the past, the last being 2008, which showed mild CAD. 72yo F with PMHx of HTN, HLD, CAD, DM2 (on Lantus, glimepiride), asthma, glaucoma, ovarian cancer s/p RT and MAYNOR-BSO presents as transfer from [facility] for NSTEMI, acute hypoxic respiratory failure 2/2 acute decompensated heart failure and asthma exacerbation from influenza A, and FLORIDALMA. The past 6 months pt has had chest pressure and SOB when she ambulates. The CP is a pressure pain, mid chest that radiates to b/l jaw and arms, presents when she walks and resolves at rest. She made an appt with a cardiologist, but they could only fit her in for an appt in a month. Her symptoms worsened to the point that getting dressed or putting on shoes would cause dizziness and SOB so she came to the ED. Pt has slept sitting up or else she feels dizzy while flat. Denies hx MI or CHF diagnosis. She developed a dry cough a week ago and noticed a sore throat today. At OSH she was found to have NSTEMI with trop-I that peaked at 6.02, EKG showed old LBBB, acute decompensated heart failure requiring BIPAP with pBNP 5803, and influenza A positive. She was monitored in the ICU. She was treated with ASA/plavix load, heparin drip. ARB held for FLORIDALMA. She was diuresed with lasix IV. TTE showed LVEF 25-30%, wall motion abnormalities, elevated filling pressures, mild MR, mod-severe AS, normal RV function, mild TR. FS down to 48 today while pt was NPO. Transferred to [facility] for cardiac cath. Currently pt denies CP, she feels wheezy. Denies F/C, N/V/D, hematochezia, melena, dysuria, LE edema, abd distension. She's had 3 cardiac catheterizations in the past, the last being 2008, which showed mild CAD. 74yo F with PMHx of HTN, HLD, CAD, DM2 (on Lantus, glimepiride), asthma, glaucoma, ovarian cancer s/p RT and MAYNOR-BSO presents as transfer from [facility] for NSTEMI, acute hypoxic respiratory failure 2/2 acute decompensated heart failure and asthma exacerbation from influenza A, and FLORIDALMA. The past 6 months pt has had chest pressure and SOB when she ambulates. The CP is a pressure pain, mid chest that radiates to b/l jaw and arms, presents when she walks and resolves at rest. Her symptoms worsened to the point that getting dressed or putting on shoes would cause dizziness and SOB so she came to the ED. Pt has slept sitting up or else she feels dizzy while flat. Denies hx MI or CHF diagnosis. She developed a dry cough a week ago and noticed a sore throat today. At OSH she was found to have NSTEMI with trop-I that peaked at 6.02, EKG showed old LBBB, acute decompensated heart failure requiring BIPAP with pBNP 5803, and influenza A positive. She was monitored in the ICU. She was treated with ASA/plavix load, heparin drip. ARB held for FLORIDALMA. She was diuresed with lasix IV. TTE showed LVEF 25-30%, wall motion abnormalities, elevated filling pressures, mild MR, mod-severe AS, normal RV function, mild TR. FS down to 48 today while pt was NPO. Transferred to [facility] for cardiac cath. Currently pt denies CP, she feels wheezy. Denies F/C, N/V/D, hematochezia, melena, dysuria, LE edema, abd distension. She's had 3 cardiac catheterizations in the past, the last being 2008, which showed mild CAD. 74yo F with PMHx of HTN, HLD, CAD, DM2 (on Lantus, glimepiride), asthma, glaucoma, uterine cancer s/p RT and MAYNOR-BSO presents as transfer from [facility] for NSTEMI, FLORIDALMA, acute hypoxic respiratory failure 2/2 acute decompensated heart failure, asthma exacerbation from influenza A. The past 6 months pt has had chest pressure and SOB when she ambulates. The CP is a pressure pain, mid chest that radiates to b/l jaw and arms, presents when she walks and resolves at rest. Her symptoms worsened to the point that getting dressed or putting on shoes would cause dizziness and SOB so she came to the ED. Pt has slept sitting up or else she feels dizzy while flat. Denies hx MI or CHF diagnosis. She developed a dry cough a week ago and noticed a sore throat today. At OSH she was found to have NSTEMI with trop-I that peaked at 6.02, EKG showed old LBBB, acute decompensated heart failure requiring BIPAP with pBNP 5803, and influenza A positive. She was monitored in the ICU. She was treated with ASA/plavix load, heparin drip. ARB held for FLORIDALMA. She was diuresed with lasix IV. TTE showed LVEF 25-30%, wall motion abnormalities, elevated filling pressures, mild MR, mod-severe AS, normal RV function, mild TR. FS down to 48 today while pt was NPO. Transferred to [facility] for cardiac cath. Currently pt denies CP, she feels wheezy. Denies F/C, N/V/D, hematochezia, melena, dysuria, LE edema, abd distension. She's had 3 cardiac catheterizations in the past, the last being 2008, which showed mild CAD. 72yo F with PMHx of HTN, HLD, CAD, DM2 (on Lantus, glimepiride), asthma, glaucoma, uterine cancer s/p RT and MAYNOR-BSO presents as transfer from [facility] for NSTEMI, FLORIDALMA, acute hypoxic respiratory failure 2/2 acute decompensated heart failure, asthma exacerbation from influenza A. The past 6 months pt has had chest pressure and SOB when she ambulates. The CP is a pressure pain, mid chest that radiates to b/l jaw and arms, presents when she walks and resolves at rest. Her symptoms worsened to the point that getting dressed or putting on shoes would cause dizziness and SOB so she came to the ED. Pt has slept sitting up for years or else she feels dizzy while flat. Denies hx MI or CHF diagnosis. She developed a dry cough a week ago and noticed a sore throat today. At OSH she was found to have NSTEMI with trop-I that peaked at 6.02, EKG showed old LBBB, acute decompensated heart failure requiring BIPAP with pBNP 5803, and influenza A positive. She was monitored in the ICU. She was treated with ASA/plavix load, heparin drip. ARB held for FLORIDALMA. She was diuresed with lasix IV. TTE showed LVEF 25-30%, wall motion abnormalities, elevated filling pressures, mild MR, mod-severe AS, normal RV function, mild TR. FS down to 48 today while pt was NPO. Transferred to [facility] for cardiac cath. Currently pt denies CP, she feels wheezy. Denies F/C, N/V/D, hematochezia, melena, dysuria, LE edema, abd distension. She's had 3 cardiac catheterizations in the past, the last being 2008, which showed mild CAD. 74yo F with PMHx of HTN, HLD, CAD, DM2 (on Lantus, glimepiride), asthma, glaucoma, uterine cancer s/p RT and MAYNOR-BSO presents as transfer from [facility] for NSTEMI, FLORIDALMA, acute hypoxic respiratory failure 2/2 acute decompensated heart failure, asthma exacerbation from influenza A. The past 6 months pt has had chest pressure and SOB when she ambulates. The CP is a pressure pain, mid chest that radiates to b/l jaw and arms, presents when she walks and resolves at rest. Her symptoms worsened to the point that getting dressed or putting on shoes would cause dizziness and SOB so she came to the ED. Pt has slept sitting up for years or else she feels dizzy while flat. Denies hx MI or CHF diagnosis. She developed a dry cough a week ago and noticed a sore throat today. At OSH she was found to have NSTEMI with trop-I that peaked at 6.02, EKG showed old LBBB, acute decompensated heart failure requiring BIPAP with pBNP 5803, and influenza A positive. She was monitored in the ICU. She was treated with ASA/plavix load, heparin drip. ARB held for FLORIDALMA, diuresed with lasix IV. TTE showed LVEF 25-30%, wall motion abnormalities, elevated filling pressures, mild MR, mod-severe AS, normal RV function, mild TR. FS down to 48 today while pt was NPO. Transferred to [facility] for cardiac cath. Currently pt denies CP, she feels wheezy. Denies F/C, N/V/D, hematochezia, melena, dysuria, LE edema, abd distension. She's had 3 cardiac catheterizations in the past, the last being 2008, which showed mild CAD. 74yo F with PMHx of HTN, HLD, CAD, DM2 (on Lantus, glimepiride), asthma, glaucoma, uterine cancer s/p RT and MAYNOR-BSO presents as transfer from [facility] for NSTEMI, FLORIDALMA, acute hypoxic respiratory failure 2/2 acute decompensated heart failure, asthma exacerbation from influenza A. The past 6 months pt has had chest pressure and SOB when she ambulates. The CP is a pressure pain, mid chest that radiates to b/l jaw and arms, presents when she walks and resolves at rest. Her symptoms worsened to the point that getting dressed or putting on shoes would cause dizziness and SOB so she came to the ED. Pt has slept sitting up for years or else she feels dizzy while flat. Denies hx MI or CHF diagnosis. She developed a dry cough a week ago and noticed a sore throat today. At OSH she was found to have NSTEMI with trop-I that peaked at 6.02, EKG showed old LBBB, acute decompensated heart failure requiring BIPAP with pBNP 5803, and influenza A positive. She was monitored in the ICU. She was treated with ASA/plavix load, heparin drip. ARB held for FLORIDALMA, diuresed with lasix IV. TTE showed LVEF 25-30%, wall motion abnormalities, elevated filling pressures, mild MR, mod-severe AS, normal RV function, mild TR. FS down to 48 today while pt was NPO after receiving lantus 30u the night prior. Transferred to [facility] for cardiac cath. Currently pt denies CP, she feels wheezy. Denies F/C, N/V/D, hematochezia, melena, dysuria, LE edema, abd distension. She's had 3 cardiac catheterizations in the past, the last being 2008, which showed mild CAD.

## 2019-03-07 NOTE — H&P ADULT - NSHPPHYSICALEXAM_GEN_ALL_CORE
Vital Signs Last 24 Hrs  T(C): 36.8 (07 Mar 2019 21:10), Max: 37.1 (07 Mar 2019 15:59)  T(F): 98.3 (07 Mar 2019 21:10), Max: 98.8 (07 Mar 2019 15:59)  HR: 86 (07 Mar 2019 21:10) (78 - 92)  BP: 136/81 (07 Mar 2019 21:10) (120/67 - 153/70)  BP(mean): 118 (07 Mar 2019 17:00) (82 - 125)  RR: 18 (07 Mar 2019 21:10) (17 - 32)  SpO2: 95% (07 Mar 2019 21:10) (95% - 100%)    GENERAL: No acute distress, well-developed  HEAD:  Atraumatic, Normocephalic  ENT: PERRL, conjunctiva and sclera clear, neck supple, no JVD, moist mucosa, posterior oropharynx clear  CHEST/LUNG: Clear to auscultation bilaterally; No wheeze, equal breath sounds bilaterally, respirations nonlabored  HEART: Regular rate and rhythm; No murmurs, rubs, or gallops  ABDOMEN: Soft, nontender, nondistended; Bowel sounds present, no organomegaly  BACK: no spinal tenderness, no CVA tenderness  EXTREMITIES:  No clubbing, cyanosis, or edema  PSYCH: Nl behavior, nl affect  NEUROLOGY: AAOx3, non-focal, moves all extremities spontaneously  SKIN: Normal color, No rashes or lesions Vital Signs Last 24 Hrs  T(C): 36.8 (07 Mar 2019 21:10), Max: 37.1 (07 Mar 2019 15:59)  T(F): 98.3 (07 Mar 2019 21:10), Max: 98.8 (07 Mar 2019 15:59)  HR: 86 (07 Mar 2019 21:10) (78 - 92)  BP: 136/81 (07 Mar 2019 21:10) (120/67 - 153/70)  BP(mean): 118 (07 Mar 2019 17:00) (82 - 125)  RR: 18 (07 Mar 2019 21:10) (17 - 32)  SpO2: 95% (07 Mar 2019 21:10) (95% - 100%)    GENERAL: No acute distress, well-developed  HEAD:  Atraumatic, Normocephalic  ENT: PERRL, conjunctiva and sclera clear, neck supple, no JVD, moist mucosa, posterior oropharynx clear  CHEST/LUNG: Expiratory wheeze throughout, dec air movement, equal breath sounds bilaterally, respirations nonlabored, on 3L NC  HEART: Regular rate and rhythm; No murmurs, rubs, or gallops  ABDOMEN: Soft, nontender, nondistended; Bowel sounds present, no organomegaly  BACK: no spinal tenderness, no CVA tenderness  EXTREMITIES:  No clubbing, cyanosis, +1 pitting edema b/l LE  PSYCH: Nl behavior, nl affect  NEUROLOGY: AAOx3, non-focal, moves all extremities spontaneously  SKIN: Normal color, No rashes or lesions Vital Signs Last 24 Hrs  T(C): 36.8 (07 Mar 2019 21:10), Max: 37.1 (07 Mar 2019 15:59)  T(F): 98.3 (07 Mar 2019 21:10), Max: 98.8 (07 Mar 2019 15:59)  HR: 86 (07 Mar 2019 21:10) (78 - 92)  BP: 136/81 (07 Mar 2019 21:10) (120/67 - 153/70)  BP(mean): 118 (07 Mar 2019 17:00) (82 - 125)  RR: 18 (07 Mar 2019 21:10) (17 - 32)  SpO2: 95% (07 Mar 2019 21:10) (95% - 100%)    GENERAL: No acute distress, well-developed  HEAD:  Atraumatic, Normocephalic  ENT: PERRL, conjunctiva and sclera clear  CHEST/LUNG: Expiratory wheeze throughout, dec air movement, equal breath sounds bilaterally, respirations nonlabored, on 3L NC  HEART: Regular rate and rhythm; No murmurs, rubs, or gallops  ABDOMEN: Soft, nontender, nondistended; Bowel sounds present, no organomegaly  BACK: no spinal tenderness, no CVA tenderness  EXTREMITIES:  No clubbing, cyanosis, +1 pitting edema b/l LE  PSYCH: Nl behavior, nl affect  NEUROLOGY: AAOx3, non-focal, moves all extremities spontaneously  SKIN: Normal color, No rashes or lesions

## 2019-03-07 NOTE — PROGRESS NOTE ADULT - SUBJECTIVE AND OBJECTIVE BOX
Patient is a 73y old  Female who presents with a chief complaint of NSTEMI , c/o sob , chest pressure (07 Mar 2019 08:50)      INTERVAL HPI/OVERNIGHT EVENTS: Pt states CP or SOB at rest. States symptoms were with ambulation but has been in bed. Denies fever, chills. +Cough.     MEDICATIONS  (STANDING):  ALBUTerol/ipratropium for Nebulization 3 milliLiter(s) Nebulizer every 6 hours  aspirin enteric coated 81 milliGRAM(s) Oral daily  atorvastatin 40 milliGRAM(s) Oral at bedtime  Brinzolamide 1%/Brimonidine Tart 0.2% 1 Drop(s) 1 Drop(s) Both EYES two times a day  clopidogrel Tablet 75 milliGRAM(s) Oral daily  dextrose 5%. 1000 milliLiter(s) (50 mL/Hr) IV Continuous <Continuous>  dextrose 50% Injectable 12.5 Gram(s) IV Push once  dextrose 50% Injectable 25 Gram(s) IV Push once  dextrose 50% Injectable 25 Gram(s) IV Push once  heparin  Infusion.  Unit(s)/Hr (10 mL/Hr) IV Continuous <Continuous>  insulin glargine Injectable (LANTUS) 10 Unit(s) SubCutaneous at bedtime  insulin lispro (HumaLOG) corrective regimen sliding scale   SubCutaneous three times a day before meals  insulin lispro (HumaLOG) corrective regimen sliding scale   SubCutaneous at bedtime  latanoprost 0.005% Ophthalmic Solution 1 Drop(s) Both EYES at bedtime  metoprolol tartrate 25 milliGRAM(s) Oral every 6 hours  oseltamivir 30 milliGRAM(s) Oral every 12 hours    MEDICATIONS  (PRN):  dextrose 40% Gel 15 Gram(s) Oral once PRN Blood Glucose LESS THAN 70 milliGRAM(s)/deciliter  glucagon  Injectable 1 milliGRAM(s) IntraMuscular once PRN Glucose LESS THAN 70 milligrams/deciliter  guaiFENesin   Syrup  (Sugar-Free) 200 milliGRAM(s) Oral every 6 hours PRN Cough  heparin  Injectable 5000 Unit(s) IV Push every 6 hours PRN For aPTT less than 40      Allergies    No Known Allergies    Intolerances        REVIEW OF SYSTEMS:  CONSTITUTIONAL: No fever or chills  HEENT:  No headache, no sore throat  RESPIRATORY: +cough, no wheezing, or shortness of breath  CARDIOVASCULAR: chest pain improved, palpitations  GASTROINTESTINAL: No abd pain, nausea, vomiting, or diarrhea  GENITOURINARY: No dysuria, frequency, or hematuria  NEUROLOGICAL: no focal weakness or dizziness  MUSCULOSKELETAL: no myalgias     Vital Signs Last 24 Hrs  T(C): 37.1 (07 Mar 2019 15:59), Max: 37.1 (07 Mar 2019 15:59)  T(F): 98.8 (07 Mar 2019 15:59), Max: 98.8 (07 Mar 2019 15:59)  HR: 86 (07 Mar 2019 16:00) (78 - 94)  BP: 141/66 (07 Mar 2019 16:00) (120/67 - 165/70)  BP(mean): 92 (07 Mar 2019 16:00) (82 - 125)  RR: 24 (07 Mar 2019 16:00) (20 - 32)  SpO2: 98% (07 Mar 2019 16:00) (95% - 100%)    PHYSICAL EXAM:  GENERAL: NAD  HEENT:  anicteric, moist mucous membranes  CHEST/LUNG:  scattered wheezes b/l, adequate inspiration  HEART:  RRR, S1, S2  ABDOMEN:  BS+, soft, nontender, nondistended  EXTREMITIES: trace LE edema b/l, no cyanosis, or calf tenderness  NERVOUS SYSTEM: answers questions and follows commands appropriately    LABS:                        13.4   10.81 )-----------( 281      ( 07 Mar 2019 05:20 )             42.2     CBC Full  -  ( 07 Mar 2019 05:20 )  WBC Count : 10.81 K/uL  Hemoglobin : 13.4 g/dL  Hematocrit : 42.2 %  Platelet Count - Automated : 281 K/uL  Mean Cell Volume : 91.7 fl  Mean Cell Hemoglobin : 29.1 pg  Mean Cell Hemoglobin Concentration : 31.8 gm/dL  Auto Neutrophil # : 7.25 K/uL  Auto Lymphocyte # : 1.87 K/uL  Auto Monocyte # : 1.60 K/uL  Auto Eosinophil # : 0.02 K/uL  Auto Basophil # : 0.04 K/uL  Auto Neutrophil % : 67.0 %  Auto Lymphocyte % : 17.3 %  Auto Monocyte % : 14.8 %  Auto Eosinophil % : 0.2 %  Auto Basophil % : 0.4 %    07 Mar 2019 05:20    142    |  101    |  31     ----------------------------<  48     4.2     |  33     |  1.50     Ca    8.9        07 Mar 2019 05:20  Phos  4.7       07 Mar 2019 05:20  Mg     2.1       07 Mar 2019 05:20    TPro  7.9    /  Alb  3.4    /  TBili  0.3    /  DBili  .10    /  AST  64     /  ALT  32     /  AlkPhos  83     07 Mar 2019 05:20    PT/INR - ( 06 Mar 2019 10:54 )   PT: 13.4 sec;   INR: 1.18 ratio         PTT - ( 07 Mar 2019 10:49 )  PTT:48.9 sec    CAPILLARY BLOOD GLUCOSE      POCT Blood Glucose.: 83 mg/dL (07 Mar 2019 16:47)  POCT Blood Glucose.: 134 mg/dL (07 Mar 2019 13:33)  POCT Blood Glucose.: 215 mg/dL (07 Mar 2019 09:16)  POCT Blood Glucose.: 82 mg/dL (07 Mar 2019 08:31)  POCT Blood Glucose.: 52 mg/dL (07 Mar 2019 08:08)  POCT Blood Glucose.: 48 mg/dL (07 Mar 2019 08:06)  POCT Blood Glucose.: 106 mg/dL (06 Mar 2019 21:09)  POCT Blood Glucose.: 117 mg/dL (06 Mar 2019 17:37)        Culture - Blood (collected 03-06-19 @ 16:38)  Source: .Blood Blood  Preliminary Report (03-07-19 @ 17:01):    No growth to date.    Culture - Blood (collected 03-06-19 @ 16:38)  Source: .Blood Blood  Preliminary Report (03-07-19 @ 17:01):    No growth to date.        RADIOLOGY & ADDITIONAL TESTS:    Personally reviewed.     Consultant(s) Notes Reviewed:  [x] YES  [ ] NO Patient is a 73y old  Female who presents with a chief complaint of NSTEMI , c/o sob , chest pressure (07 Mar 2019 08:50)      INTERVAL HPI/OVERNIGHT EVENTS: Pt states CP or SOB at rest. States symptoms were with ambulation but has been in bed. Denies fever, chills. +Cough.     MEDICATIONS  (STANDING):  ALBUTerol/ipratropium for Nebulization 3 milliLiter(s) Nebulizer every 6 hours  aspirin enteric coated 81 milliGRAM(s) Oral daily  atorvastatin 40 milliGRAM(s) Oral at bedtime  Brinzolamide 1%/Brimonidine Tart 0.2% 1 Drop(s) 1 Drop(s) Both EYES two times a day  clopidogrel Tablet 75 milliGRAM(s) Oral daily  dextrose 5%. 1000 milliLiter(s) (50 mL/Hr) IV Continuous <Continuous>  dextrose 50% Injectable 12.5 Gram(s) IV Push once  dextrose 50% Injectable 25 Gram(s) IV Push once  dextrose 50% Injectable 25 Gram(s) IV Push once  heparin  Infusion.  Unit(s)/Hr (10 mL/Hr) IV Continuous <Continuous>  insulin glargine Injectable (LANTUS) 10 Unit(s) SubCutaneous at bedtime  insulin lispro (HumaLOG) corrective regimen sliding scale   SubCutaneous three times a day before meals  insulin lispro (HumaLOG) corrective regimen sliding scale   SubCutaneous at bedtime  latanoprost 0.005% Ophthalmic Solution 1 Drop(s) Both EYES at bedtime  metoprolol tartrate 25 milliGRAM(s) Oral every 6 hours  oseltamivir 30 milliGRAM(s) Oral every 12 hours    MEDICATIONS  (PRN):  dextrose 40% Gel 15 Gram(s) Oral once PRN Blood Glucose LESS THAN 70 milliGRAM(s)/deciliter  glucagon  Injectable 1 milliGRAM(s) IntraMuscular once PRN Glucose LESS THAN 70 milligrams/deciliter  guaiFENesin   Syrup  (Sugar-Free) 200 milliGRAM(s) Oral every 6 hours PRN Cough  heparin  Injectable 5000 Unit(s) IV Push every 6 hours PRN For aPTT less than 40      Allergies    No Known Allergies    Intolerances        REVIEW OF SYSTEMS:  CONSTITUTIONAL: No fever or chills  HEENT:  No headache, no sore throat  RESPIRATORY: +cough, no wheezing, or shortness of breath  CARDIOVASCULAR: chest pain improved, palpitations  GASTROINTESTINAL: No abd pain, nausea, vomiting, or diarrhea  GENITOURINARY: No dysuria, frequency, or hematuria  NEUROLOGICAL: no focal weakness or dizziness  MUSCULOSKELETAL: no myalgias     Vital Signs Last 24 Hrs  T(C): 37.1 (07 Mar 2019 15:59), Max: 37.1 (07 Mar 2019 15:59)  T(F): 98.8 (07 Mar 2019 15:59), Max: 98.8 (07 Mar 2019 15:59)  HR: 86 (07 Mar 2019 16:00) (78 - 94)  BP: 141/66 (07 Mar 2019 16:00) (120/67 - 165/70)  BP(mean): 92 (07 Mar 2019 16:00) (82 - 125)  RR: 24 (07 Mar 2019 16:00) (20 - 32)  SpO2: 98% (07 Mar 2019 16:00) (95% - 100%)    PHYSICAL EXAM:  GENERAL: NAD, obese  HEENT:  anicteric, moist mucous membranes  CHEST/LUNG:  scattered wheezes b/l, adequate inspiration  HEART:  RRR, S1, S2  ABDOMEN:  BS+, soft, nontender, nondistended  EXTREMITIES: trace LE edema b/l, no cyanosis, or calf tenderness  NERVOUS SYSTEM: answers questions and follows commands appropriately    LABS:                        13.4   10.81 )-----------( 281      ( 07 Mar 2019 05:20 )             42.2     CBC Full  -  ( 07 Mar 2019 05:20 )  WBC Count : 10.81 K/uL  Hemoglobin : 13.4 g/dL  Hematocrit : 42.2 %  Platelet Count - Automated : 281 K/uL  Mean Cell Volume : 91.7 fl  Mean Cell Hemoglobin : 29.1 pg  Mean Cell Hemoglobin Concentration : 31.8 gm/dL  Auto Neutrophil # : 7.25 K/uL  Auto Lymphocyte # : 1.87 K/uL  Auto Monocyte # : 1.60 K/uL  Auto Eosinophil # : 0.02 K/uL  Auto Basophil # : 0.04 K/uL  Auto Neutrophil % : 67.0 %  Auto Lymphocyte % : 17.3 %  Auto Monocyte % : 14.8 %  Auto Eosinophil % : 0.2 %  Auto Basophil % : 0.4 %    07 Mar 2019 05:20    142    |  101    |  31     ----------------------------<  48     4.2     |  33     |  1.50     Ca    8.9        07 Mar 2019 05:20  Phos  4.7       07 Mar 2019 05:20  Mg     2.1       07 Mar 2019 05:20    TPro  7.9    /  Alb  3.4    /  TBili  0.3    /  DBili  .10    /  AST  64     /  ALT  32     /  AlkPhos  83     07 Mar 2019 05:20    PT/INR - ( 06 Mar 2019 10:54 )   PT: 13.4 sec;   INR: 1.18 ratio         PTT - ( 07 Mar 2019 10:49 )  PTT:48.9 sec    CAPILLARY BLOOD GLUCOSE      POCT Blood Glucose.: 83 mg/dL (07 Mar 2019 16:47)  POCT Blood Glucose.: 134 mg/dL (07 Mar 2019 13:33)  POCT Blood Glucose.: 215 mg/dL (07 Mar 2019 09:16)  POCT Blood Glucose.: 82 mg/dL (07 Mar 2019 08:31)  POCT Blood Glucose.: 52 mg/dL (07 Mar 2019 08:08)  POCT Blood Glucose.: 48 mg/dL (07 Mar 2019 08:06)  POCT Blood Glucose.: 106 mg/dL (06 Mar 2019 21:09)  POCT Blood Glucose.: 117 mg/dL (06 Mar 2019 17:37)        Culture - Blood (collected 03-06-19 @ 16:38)  Source: .Blood Blood  Preliminary Report (03-07-19 @ 17:01):    No growth to date.    Culture - Blood (collected 03-06-19 @ 16:38)  Source: .Blood Blood  Preliminary Report (03-07-19 @ 17:01):    No growth to date.        RADIOLOGY & ADDITIONAL TESTS:    Personally reviewed.     Consultant(s) Notes Reviewed:  [x] YES  [ ] NO

## 2019-03-07 NOTE — H&P ADULT - NSHPSOCIALHISTORY_GEN_ALL_CORE
Lives with , retired administration at a college. Ambulates with assistance. Never tob, etoh, illicit drugs.

## 2019-03-07 NOTE — DISCHARGE NOTE NURSING/CASE MANAGEMENT/SOCIAL WORK - NSDCPEPT PROEDHF_GEN_ALL_CORE
Activities as tolerated/Report signs and symptoms to primary care provider/Low salt diet/Monitor weight daily/Call primary care provider for follow up after discharge

## 2019-03-07 NOTE — H&P ADULT - PROBLEM SELECTOR PLAN 7
continue BB, hold ARB due to FLORIDALMA -Tamiflu 5-day course, EOT 3/11  -cough symptom control A1c 6.8. Home regimen lantus 68u qhs, glimepiride, Januvia, metformin.  -pt has episodes of hypoglycemia to 48 today after received lantus 30u the night prior  -lantus 10u qhs  -MAN A1c 6.8. Home regimen lantus 68u qhs, glimepiride, Januvia, metformin.  -pt has episodes of hypoglycemia to 48 today after received lantus 30u the night prior and was NPO  -lantus 10u qhs  -MAN

## 2019-03-07 NOTE — H&P ADULT - REASON FOR ADMISSION
NSTEMI NSTEMI, acute decompensated heart failure, FLORIDALMA, influenza A NSTEMI, acute hypoxic respiratory failure, acute decompensated heart failure, FLORIDALMA, influenza A

## 2019-03-07 NOTE — PROGRESS NOTE ADULT - SUBJECTIVE AND OBJECTIVE BOX
CHIEF COMPLAINT: Patient is a 73y old  Female who presents with a chief complaint of NSTEMI , c/o sob , chest pressure (06 Mar 2019 20:41)      HPI:  Pt is a 72 y/o F with PMHx of HTN, HLD, Type II DM (on Lantus, glimepiride), who presents with worsening SOB and chest pressure over last month. Pt states she has been feeling SOB after walking 50 feet for past month. Pt states chest pressure lies over midline chest, and radiates to both sides of her jaw, and both arms.  as per cardio mass pe heart gp pt was seen ago in there office     Of note pt had previously followed Dr. Christie (cardiology), and has had 3 coronary catheterizations over past 10 years, with most recent cath 2008 for abnormal ECG. Pt states these caths have been unremarkable.    In the ED: VS T 98, , /79 RR 26 90%RA. Labs significant for WBC 14.85, Cr 1.50, glu 370, Ca 8.1, proCal 0.05, POCT 277. Pt was started on BiPAP in ED, with improval in sats to 100%. Pt evaluated by ICU in ED. (06 Mar 2019 13:15)      Subjective: Patient seen and examined. Currently denies chest pain or palpitations. SOB much improved. Slight cough.        MEDICATIONS  (STANDING):  ALBUTerol/ipratropium for Nebulization 3 milliLiter(s) Nebulizer every 6 hours  aspirin enteric coated 81 milliGRAM(s) Oral daily  atorvastatin 40 milliGRAM(s) Oral at bedtime  clopidogrel Tablet 75 milliGRAM(s) Oral daily  dextrose 5% + sodium chloride 0.45%. 1000 milliLiter(s) (75 mL/Hr) IV Continuous <Continuous>  dextrose 5%. 1000 milliLiter(s) (50 mL/Hr) IV Continuous <Continuous>  dextrose 50% Injectable 12.5 Gram(s) IV Push once  dextrose 50% Injectable 25 Gram(s) IV Push once  dextrose 50% Injectable 25 Gram(s) IV Push once  heparin  Infusion.  Unit(s)/Hr (10 mL/Hr) IV Continuous <Continuous>  influenza   Vaccine 0.5 milliLiter(s) IntraMuscular once  insulin glargine Injectable (LANTUS) 10 Unit(s) SubCutaneous at bedtime  insulin lispro (HumaLOG) corrective regimen sliding scale   SubCutaneous three times a day before meals  insulin lispro (HumaLOG) corrective regimen sliding scale   SubCutaneous at bedtime  latanoprost 0.005% Ophthalmic Solution 1 Drop(s) Both EYES at bedtime  metoprolol tartrate 25 milliGRAM(s) Oral every 8 hours  oseltamivir 30 milliGRAM(s) Oral every 12 hours    MEDICATIONS  (PRN):  dextrose 40% Gel 15 Gram(s) Oral once PRN Blood Glucose LESS THAN 70 milliGRAM(s)/deciliter  glucagon  Injectable 1 milliGRAM(s) IntraMuscular once PRN Glucose LESS THAN 70 milligrams/deciliter  guaiFENesin   Syrup  (Sugar-Free) 200 milliGRAM(s) Oral every 6 hours PRN Cough  heparin  Injectable 5000 Unit(s) IV Push every 6 hours PRN For aPTT less than 40      Allergies    No Known Allergies    Intolerances        REVIEW OF SYSTEMS:    CONSTITUTIONAL: No weakness, fevers or chills  EYES/ENT: No visual changes;  No vertigo or throat pain   NECK: No pain or stiffness  RESPIRATORY: (+) cough, No wheezing, hemoptysis; (+) shortness of breath  CARDIOVASCULAR: No chest pain or palpitations  GASTROINTESTINAL: No abdominal pain. No nausea, vomiting, or hematemesis; No diarrhea or constipation. No melena or hematochezia.  GENITOURINARY: No dysuria, frequency or hematuria  NEUROLOGICAL: No numbness or weakness  SKIN: No itching or rash  All other review of systems is negative unless indicated above    VITAL SIGNS:   Vital Signs Last 24 Hrs  T(C): 36.6 (07 Mar 2019 07:47), Max: 37.2 (06 Mar 2019 13:45)  T(F): 97.9 (07 Mar 2019 07:47), Max: 98.9 (06 Mar 2019 13:45)  HR: 79 (07 Mar 2019 07:54) (79 - 140)  BP: 152/66 (07 Mar 2019 06:00) (86/50 - 185/79)  BP(mean): 95 (07 Mar 2019 06:00) (88 - 113)  RR: 24 (07 Mar 2019 06:00) (16 - 51)  SpO2: 97% (07 Mar 2019 07:54) (90% - 100%)    I&O's Summary    06 Mar 2019 07:01  -  07 Mar 2019 07:00  --------------------------------------------------------  IN: 592.5 mL / OUT: 1700 mL / NET: -1107.5 mL        PHYSICAL EXAM:    Constitutional: NAD, awake and alert, well-developed  Eyes:  EOMI,  Pupils round, No oral cyanosis.  Pulmonary: Non-labored, breath sounds are clear bilaterally, No wheezing, rales or rhonchi  Cardiovascular: S1 and S2, regular rate and rhythm, no Murmurs, gallops or rubs  Gastrointestinal: Bowel Sounds present, soft, nontender.   Lymph: No peripheral edema. No cervical lymphadenopathy.  Neurological: Alert, no focal deficits  Extremities: no lower extremity edema bilaterally, intact distal pedal pulses bilaterally  Skin: No rashes.  Psych:  Mood & affect appropriate    LABS: All Labs Reviewed:                        13.4   10.81 )-----------( 281      ( 07 Mar 2019 05:20 )             42.2                         13.9   12.54 )-----------( 298      ( 06 Mar 2019 20:11 )             43.8                         13.8   14.85 )-----------( 323      ( 06 Mar 2019 10:55 )             43.8     07 Mar 2019 05:20    142    |  101    |  31     ----------------------------<  48     4.2     |  33     |  1.50   06 Mar 2019 19:30    140    |  102    |  24     ----------------------------<  147    4.3     |  30     |  1.40   06 Mar 2019 10:54    136    |  101    |  21     ----------------------------<  370    5.0     |  26     |  1.50     Ca    8.9        07 Mar 2019 05:20  Ca    8.7        06 Mar 2019 19:30  Ca    8.1        06 Mar 2019 10:54  Phos  4.7       07 Mar 2019 05:20  Phos  4.4       06 Mar 2019 19:30  Mg     2.1       07 Mar 2019 05:20  Mg     2.0       06 Mar 2019 19:30    TPro  7.9    /  Alb  3.4    /  TBili  0.3    /  DBili  .10    /  AST  64     /  ALT  32     /  AlkPhos  83     07 Mar 2019 05:20  TPro  8.0    /  Alb  3.6    /  TBili  0.4    /  DBili  x      /  AST  32     /  ALT  34     /  AlkPhos  97     06 Mar 2019 10:54    PT/INR - ( 06 Mar 2019 10:54 )   PT: 13.4 sec;   INR: 1.18 ratio         PTT - ( 07 Mar 2019 03:34 )  PTT:97.2 sec  CARDIAC MARKERS ( 07 Mar 2019 05:20 )  6.020 ng/mL / x     / 646 U/L / x     / 8.8 ng/mL  CARDIAC MARKERS ( 06 Mar 2019 19:30 )  4.030 ng/mL / x     / 406 U/L / x     / 12.2 ng/mL  CARDIAC MARKERS ( 06 Mar 2019 10:54 )  .296 ng/mL / x     / x     / x     / 3.6 ng/mL      03-06 @ 10:54  Pro Bnp [id number]        Telemetry: NSR underlying BBB      Imaging: ECHO official read pending but EF 25-30%. Mild-moderate AI

## 2019-03-07 NOTE — H&P ADULT - PSH
S/P MAYNOR (total abdominal hysterectomy) S/P MAYNOR-BSO (total abdominal hysterectomy and bilateral salpingo-oophorectomy)

## 2019-03-07 NOTE — H&P ADULT - NSHPLABSRESULTS_GEN_ALL_CORE
13.4   10.81 )-----------( 281      ( 07 Mar 2019 05:20 )             42.2     Hemoglobin: 13.4 g/dL (03-07 @ 05:20)  Hemoglobin: 13.9 g/dL (03-06 @ 20:11)  Hemoglobin: 13.8 g/dL (03-06 @ 10:55)    CBC Full  -  ( 07 Mar 2019 05:20 )  WBC Count : 10.81 K/uL  Hemoglobin : 13.4 g/dL  Hematocrit : 42.2 %  Platelet Count - Automated : 281 K/uL  Mean Cell Volume : 91.7 fl  Mean Cell Hemoglobin : 29.1 pg  Mean Cell Hemoglobin Concentration : 31.8 gm/dL  Auto Neutrophil # : 7.25 K/uL  Auto Lymphocyte # : 1.87 K/uL  Auto Monocyte # : 1.60 K/uL  Auto Eosinophil # : 0.02 K/uL  Auto Basophil # : 0.04 K/uL  Auto Neutrophil % : 67.0 %  Auto Lymphocyte % : 17.3 %  Auto Monocyte % : 14.8 %  Auto Eosinophil % : 0.2 %  Auto Basophil % : 0.4 %    03-07    139  |  99  |  34<H>  ----------------------------<  79  3.8   |  32<H>  |  1.40<H>    Ca    8.6      07 Mar 2019 17:07  Phos  4.7     03-07  Mg     2.1     03-07    TPro  7.9  /  Alb  3.4  /  TBili  0.3  /  DBili  .10  /  AST  64<H>  /  ALT  32  /  AlkPhos  83  03-07    Creatinine Trend: 1.40<--, 1.50<--, 1.40<--, 1.50<--  LIVER FUNCTIONS - ( 07 Mar 2019 05:20 )  Alb: 3.4 g/dL / Pro: 7.9 g/dL / ALK PHOS: 83 U/L / ALT: 32 U/L / AST: 64 U/L / GGT: x           PT/INR - ( 06 Mar 2019 10:54 )   PT: 13.4 sec;   INR: 1.18 ratio         PTT - ( 07 Mar 2019 17:07 )  PTT:57.0 sec  CARDIAC MARKERS ( 07 Mar 2019 17:07 )  3.950 ng/mL / x     / 863 U/L / x     / 5.6 ng/mL  CARDIAC MARKERS ( 07 Mar 2019 05:20 )  6.020 ng/mL / x     / 646 U/L / x     / 8.8 ng/mL  CARDIAC MARKERS ( 06 Mar 2019 19:30 )  4.030 ng/mL / x     / 406 U/L / x     / 12.2 ng/mL  CARDIAC MARKERS ( 06 Mar 2019 10:54 )  .296 ng/mL / x     / x     / x     / 3.6 ng/mL                  EKG 3/06/2019: sinus tachy rate 127    MICROBIOLOGY:  Flu A positive    Culture - Blood (collected 06 Mar 2019 16:38)  Source: .Blood Blood  Preliminary Report (07 Mar 2019 17:01):    No growth to date.    Culture - Blood (collected 06 Mar 2019 16:38)  Source: .Blood Blood  Preliminary Report (07 Mar 2019 17:01):    No growth to date.      IMAGING:    Labs, imaging, EKG personally reviewed 13.4   10.81 )-----------( 281      ( 07 Mar 2019 05:20 )             42.2     Hemoglobin: 13.4 g/dL (03-07 @ 05:20)  Hemoglobin: 13.9 g/dL (03-06 @ 20:11)  Hemoglobin: 13.8 g/dL (03-06 @ 10:55)    03-07    139  |  99  |  34<H>  ----------------------------<  79  3.8   |  32<H>  |  1.40<H>    Ca    8.6      07 Mar 2019 17:07  Phos  4.7     03-07  Mg     2.1     03-07    TPro  7.9  /  Alb  3.4  /  TBili  0.3  /  DBili  .10  /  AST  64<H>  /  ALT  32  /  AlkPhos  83  03-07    Creatinine Trend: 1.40<--, 1.50<--, 1.40<--, 1.50<--  LIVER FUNCTIONS - ( 07 Mar 2019 05:20 )  Alb: 3.4 g/dL / Pro: 7.9 g/dL / ALK PHOS: 83 U/L / ALT: 32 U/L / AST: 64 U/L / GGT: x           PT/INR - ( 06 Mar 2019 10:54 )   PT: 13.4 sec;   INR: 1.18 ratio         PTT - ( 07 Mar 2019 17:07 )  PTT:57.0 sec  CARDIAC MARKERS ( 07 Mar 2019 17:07 )  3.950 ng/mL / x     / 863 U/L / x     / 5.6 ng/mL  CARDIAC MARKERS ( 07 Mar 2019 05:20 )  6.020 ng/mL / x     / 646 U/L / x     / 8.8 ng/mL  CARDIAC MARKERS ( 06 Mar 2019 19:30 )  4.030 ng/mL / x     / 406 U/L / x     / 12.2 ng/mL  CARDIAC MARKERS ( 06 Mar 2019 10:54 )  .296 ng/mL / x     / x     / x     / 3.6 ng/mL      EKG 3/06/2019: sinus tachy rate 127, TWI AVL  EKG 3/07: NSR rate 85, LBBB, QTc 528, no TWI    MICROBIOLOGY:  Flu A positive    Culture - Blood (collected 06 Mar 2019 16:38)  Source: .Blood Blood  Preliminary Report (07 Mar 2019 17:01):    No growth to date.    Culture - Blood (collected 06 Mar 2019 16:38)  Source: .Blood Blood  Preliminary Report (07 Mar 2019 17:01):    No growth to date.      IMAGING:  < from: TTE Echo Doppler w/o Cont (03.06.19 @ 15:10) >  CONCLUSIONS:    1. Decreased systolic function with a visually estimated left ventricular   ejection fraction of 25-30%.  2. Multiple regional wall motion abnormalities as described above.  3. LV diastolic dysfunction with evidence of elevated filling pressures.  4. Moderate posterior mitral annular calcification. Mild mitral valve   regurgitation.  5. Calcified trileaflet aortic valve. Moderate to severe aortic stenosis   and mild aortic valve insufficiency.  6. the right ventricle is grossly normal in size and systolic function.  7. Normal tricuspid valve with mild tricuspid valve regurgitation with   incomplete waveform.  8. Normal pulmonic valve with trace insufficiency.  9. Limited study to estimate pulmonary artery systolic pressure  < end of copied text >    < from: Xray Chest 1 View- PORTABLE-Urgent (03.06.19 @ 10:38) >    PROCEDURE DATE:  03/06/2019        INTERPRETATION:  Clinical information: Dyspnea.  Technique: Frontal view of the chest.    comparison: None available.    Findings: The lungs are clear. The heart size is enlarged. There are mild   multilevel degenerative changes of the thoracic spine.    IMPRESSION: Cardiomegaly.  < end of copied text >      Labs, imaging, EKG personally reviewed Labs, imaging and EKG personally reviewed and interpreted by me - WBC 10.81, Hb 13.4, BUN/Cr 34/1.40. CXR with bibasilar pleural effusions. EKG 3/06/2019: sinus tachy rate 127, TWI AVL; EKG 3/07: NSR rate 85, LBBB, QTc 528, no TWI                13.4   10.81 )-----------( 281      ( 07 Mar 2019 05:20 )             42.2     Hemoglobin: 13.4 g/dL (03-07 @ 05:20)  Hemoglobin: 13.9 g/dL (03-06 @ 20:11)  Hemoglobin: 13.8 g/dL (03-06 @ 10:55)    03-07    139  |  99  |  34<H>  ----------------------------<  79  3.8   |  32<H>  |  1.40<H>    Ca    8.6      07 Mar 2019 17:07  Phos  4.7     03-07  Mg     2.1     03-07    TPro  7.9  /  Alb  3.4  /  TBili  0.3  /  DBili  .10  /  AST  64<H>  /  ALT  32  /  AlkPhos  83  03-07    Creatinine Trend: 1.40<--, 1.50<--, 1.40<--, 1.50<--  LIVER FUNCTIONS - ( 07 Mar 2019 05:20 )  Alb: 3.4 g/dL / Pro: 7.9 g/dL / ALK PHOS: 83 U/L / ALT: 32 U/L / AST: 64 U/L / GGT: x           PT/INR - ( 06 Mar 2019 10:54 )   PT: 13.4 sec;   INR: 1.18 ratio    PTT - ( 07 Mar 2019 17:07 )  PTT:57.0 sec    CARDIAC MARKERS ( 07 Mar 2019 17:07 )  3.950 ng/mL / x     / 863 U/L / x     / 5.6 ng/mL  CARDIAC MARKERS ( 07 Mar 2019 05:20 )  6.020 ng/mL / x     / 646 U/L / x     / 8.8 ng/mL  CARDIAC MARKERS ( 06 Mar 2019 19:30 )  4.030 ng/mL / x     / 406 U/L / x     / 12.2 ng/mL  CARDIAC MARKERS ( 06 Mar 2019 10:54 )  .296 ng/mL / x     / x     / x     / 3.6 ng/mL          MICROBIOLOGY:  Flu A positive    Culture - Blood (collected 06 Mar 2019 16:38)  Source: .Blood Blood  Preliminary Report (07 Mar 2019 17:01):    No growth to date.    Culture - Blood (collected 06 Mar 2019 16:38)  Source: .Blood Blood  Preliminary Report (07 Mar 2019 17:01):    No growth to date.      IMAGING:  < from: TTE Echo Doppler w/o Cont (03.06.19 @ 15:10) >  CONCLUSIONS:    1. Decreased systolic function with a visually estimated left ventricular   ejection fraction of 25-30%.  2. Multiple regional wall motion abnormalities as described above.  3. LV diastolic dysfunction with evidence of elevated filling pressures.  4. Moderate posterior mitral annular calcification. Mild mitral valve   regurgitation.  5. Calcified trileaflet aortic valve. Moderate to severe aortic stenosis   and mild aortic valve insufficiency.  6. the right ventricle is grossly normal in size and systolic function.  7. Normal tricuspid valve with mild tricuspid valve regurgitation with   incomplete waveform.  8. Normal pulmonic valve with trace insufficiency.  9. Limited study to estimate pulmonary artery systolic pressure  < end of copied text >    < from: Xray Chest 1 View- PORTABLE-Urgent (03.06.19 @ 10:38) >    PROCEDURE DATE:  03/06/2019        INTERPRETATION:  Clinical information: Dyspnea.  Technique: Frontal view of the chest.    comparison: None available.    Findings: The lungs are clear. The heart size is enlarged. There are mild   multilevel degenerative changes of the thoracic spine.    IMPRESSION: Cardiomegaly.  < end of copied text >

## 2019-03-07 NOTE — PROGRESS NOTE ADULT - ASSESSMENT
Assessment: 73yr old female with stated hx significant for HTN, CAD, Asthma, who presents with 6 month period of progressive sob with worsening over past 2 to 3 days accompanied by productive cough and midsternal chest pain. found to have + troponins, LBBB (Not new) with Flu A+ on tamiflu.  pt admitted to ICU for NSTEMI with ADHF    Neuro: no acute issues, neuro checks q4h  Cardio: suspected NSTEMI- + trending troponins, ASA, plavix, heparin gtt, metoprolol, statin. Hold losartan for FLORIDALMA. LBBB on EKG, known. Acute CHF exacerbation, lasix 40mg IV q12h  Pulm: Flu A, hx of asthma. Continue duonebs and tamiflu. Off bipap now. 2L NC  ID: Flu A positive, tamiflu day 2/5  GI: Keep NPO for cath  Renal/lytes: FLORIDALMA, cr 1.50, baseline not known. Lytes wnl except phos 4.7, continue to monitor lytes and renal indices closely as patient is on lasix for ADHF  Endo: DM2 - continue lantus 30u qhs, ISS, accuchecks  Heme/Onc: H/H stable, heparin gtt  Skin: no acute issues  Dispo: Patient stable for transfer to [place] for cardiac cath this AM Assessment: 73yr old female with stated hx significant for HTN, CAD, Asthma, who presents with 6 month period of progressive sob with worsening over past 2 to 3 days accompanied by productive cough and midsternal chest pain. found to have + troponins, LBBB (Not new) with Flu A+ on tamiflu.  pt admitted to ICU for NSTEMI with ADHF    Neuro: no acute issues, neuro checks q4h  Cardio: suspected NSTEMI- + trending troponins, ASA, plavix, heparin gtt, metoprolol, statin. Hold losartan for FLORIDALMA. LBBB on EKG, known. Echo performed,  LV EF of 25-30% with multiple wall motion abnormalities.  Acute CHF exacerbation, lasix 40mg IV q12h  Pulm: Flu A, hx of asthma. Continue duonebs and tamiflu. Off bipap now. 2L NC  ID: Flu A positive, tamiflu day 2/5  GI: Keep NPO for cath  Renal/lytes: FLORIDALMA, cr 1.50, baseline not known. Lytes wnl except phos 4.7, continue to monitor lytes and renal indices closely as patient is on lasix for ADHF  Endo: DM2 - continue lantus 30u qhs, ISS, accuchecks  Heme/Onc: H/H stable, heparin gtt  Skin: no acute issues  Dispo: Patient stable for transfer to floor and then to [place] for cardiac cath

## 2019-03-07 NOTE — PROGRESS NOTE ADULT - PROBLEM SELECTOR PLAN 1
- admit to ICU acute hypoxic respiratory failure due to acute pulmonary edema from acute CHF and also from influenza infection  - improving with diuresis   - plan for cardiac cath today  - c/w Tamiflu for influenza infection

## 2019-03-07 NOTE — H&P ADULT - PROBLEM SELECTOR PLAN 8
VTE ppx: heparin gtt  Diet: DASH, CC  Dispo: PT eval continue BB, hold ARB due to FLORIDALMA -Tamiflu 5-day course, EOT 3/11  -cough symptom control

## 2019-03-07 NOTE — H&P ADULT - NSHPREVIEWOFSYSTEMS_GEN_ALL_CORE
CONSTITUTIONAL: +weakness, No fevers or chills  EYES/ENT: No visual changes;  No dysphagia  NECK: No pain or stiffness  RESPIRATORY: see HPI  CARDIOVASCULAR: see HPI  GASTROINTESTINAL: No abdominal or epigastric pain. No nausea, vomiting, or hematemesis; No diarrhea or constipation. No melena or hematochezia.  GENITOURINARY: No dysuria, frequency or hematuria  NEUROLOGICAL: No numbness or weakness  HEMATOLOGY: No easy bleeding, no lymphadenopathy  SKIN: No itching, burning, rashes, or lesions   All other review of systems is negative unless indicated above. CONSTITUTIONAL: +weakness, No fevers or chills  EYES/ENT: No visual changes;  No dysphagia  NECK: No pain or stiffness  RESPIRATORY: see HPI  CARDIOVASCULAR: see HPI  GASTROINTESTINAL: No abdominal, no nausea, vomiting  GENITOURINARY: No dysuria, frequency or hematuria  NEUROLOGICAL: No numbness or weakness  HEMATOLOGY: No easy bleeding, no lymphadenopathy  SKIN: No itching, burning, rashes, or lesions  MSK: no joint pain or swelling   PSYCH: no anxiety, depression

## 2019-03-07 NOTE — H&P ADULT - PROBLEM SELECTOR PLAN 2
2/2 NSTEMI, mod-severe AS, and asthma exacerbation, required BIPAP at OSH  -s/p lasix IV diuresis 2/2 NSTEMI, mod-severe AS, and asthma exacerbation, required BIPAP at OSH  -continue diuresis with lasix 40mg IV daily  -strict I/Os, daily weights standing 2/2 NSTEMI, mod-severe AS, and asthma exacerbation, required BIPAP at OSH  -continue diuresis with lasix 40mg IV daily  -strict I/Os, daily weights standing  -ischemic eval per cardiology

## 2019-03-07 NOTE — DISCHARGE NOTE NURSING/CASE MANAGEMENT/SOCIAL WORK - NSDCDPATPORTLINK_GEN_ALL_CORE
You can access the [facility], offered by [facility], by registering with the following website: [email or web address]

## 2019-03-07 NOTE — PROGRESS NOTE ADULT - PROBLEM SELECTOR PLAN 6
- holding home medications: Glimepiride  - insulin coverage scale w/hypoglycemia protocol, continue Lantus -- decreased to 10un QHS as pt was hypoglycemic overnight with just 30un last night (home dose >60un QHS)  - Accuchecks AC&HS.  - Hgb A1c of 6.8%

## 2019-03-07 NOTE — PROGRESS NOTE ADULT - ASSESSMENT
72yo F with PMH of HTN, HLD, Type 2 DM (on insulin and oral meds), who presents with worsening SOB and chest pressure over last month especially in last few days, admitted with acute hypoxic respiratory failure due to acute CHF and influenza infection found to have NSTEMI, now being transferred for cardiac cath.

## 2019-03-07 NOTE — PROGRESS NOTE ADULT - PROBLEM SELECTOR PLAN 5
- pt noted to be flu A (+)  - likely contributor to pt's resp distress  - c/w Tamiflu   - droplet precs

## 2019-03-07 NOTE — H&P ADULT - PROBLEM SELECTOR PLAN 3
2/2 influenza A infection  -supplemental oxygen as needed to maintain sat >92%  -start prednisone 40mg daily  -duoneb q4 Improving. 2/2 pulmonary edema and asthma exacerbation. Required BIPAP as OSH  -supplemental oxygen to maintain sat >92%  -see plan for HF and asthma exacerbation Improving. 2/2 pulmonary edema, asthma exacerbation, flu. Required BIPAP as OSH  -supplemental oxygen to maintain sat >92%  -see plan for HF and asthma exacerbation Improving. 2/2 pulmonary edema, asthma exacerbation, flu. Required BIPAP as OSH  -supplemental oxygen to maintain sat >92%, currently doing well on 3LNC  -see plan for HF and asthma exacerbation

## 2019-03-07 NOTE — H&P ADULT - PROBLEM SELECTOR PLAN 5
A1c 6.8. Home regimen lantus 68u qhs, glimepiride, Januvia, metformin.  -pt has episodes of hypoglycemia today while NPO  -hold basal insulin  -MAN, start coverage as needed SCr 1.5 from last known baseline 1.01 (9/2013), likely hemodynamically medicated from NSTEMI/acute heart failure.  -strict I/Os, avoid nephrotoxins, renally dose meds  -monitor SCr

## 2019-03-07 NOTE — H&P ADULT - PROBLEM SELECTOR PLAN 4
SCr 1.5 from last known baseline 1.01 (9/2013), likely hemodynamically medicated from NSTEMI/acute heart failure.  -strict I/Os, avoid nephrotoxins, renally dose meds  -monitor SCr 2/2 influenza A infection  -supplemental oxygen as needed to maintain sat >92%  -start prednisone 40mg daily  -duoneb q4 2/2 influenza A infection  -supplemental oxygen as needed to maintain sat >92%  -start prednisone 40mg daily x 5d  -duoneb q4

## 2019-03-07 NOTE — H&P ADULT - PROBLEM SELECTOR PROBLEM 7
Hypertension Influenza A Diabetes mellitus Type 2 diabetes mellitus with hypoglycemia without coma, with long-term current use of insulin

## 2019-03-07 NOTE — H&P ADULT - ASSESSMENT
74yo F with PMHx of HTN, HLD, DM2 (on Lantus, glimepiride), asthma, glaucoma, ovarian cancer s/p RT and MAYNOR-BSO presents as transfer from [facility] for NSTEMI, acute decompensated heart failure, asthma exacerbation 2/2 influenza A, and FLORIDALMA. 72yo F with PMHx of HTN, HLD, CAD, DM2 (on Lantus, glimepiride), asthma, glaucoma, ovarian cancer s/p RT and MAYNOR-BSO presents as transfer from [facility] for NSTEMI, acute hypoxic respiratory failure 2/2 acute decompensated heart failure and asthma exacerbation from influenza A, and FLORIDALMA. 72yo F with PMHx of HTN, HLD, CAD, DM2 (on Lantus, glimepiride), asthma, glaucoma, uterine cancer s/p RT and MAYNOR-BSO presents as transfer from [facility] for NSTEMI, FLORIDALMA, acute hypoxic respiratory failure 2/2 acute decompensated heart failure, asthma exacerbation, and influenza A.

## 2019-03-07 NOTE — H&P ADULT - PROBLEM SELECTOR PLAN 1
Presents with NSTEMI, EKG shows old LBBB. Previous cardiac cath showed mild CAD  -s/p ASA/Plavix load  -continue ASA 81, plavix, metoprolol tartrate 50 BID, statin  -start ARB after kidney function improves  -Cardiology following, to have cardiac cath Presents with NSTEMI, EKG shows old LBBB. Previous cardiac cath showed mild CAD  -s/p ASA/Plavix load  -continue ASA 81, plavix, heparin gtt, metoprolol tartrate 50 BID, statin  -start ARB after kidney function improves  -Cardiology following, to have cardiac cath Presents with NSTEMI, EKG shows old LBBB. Previous cardiac cath showed mild CAD  -s/p ASA/Plavix load  -continue ASA 81, plavix, heparin gtt, metoprolol tartrate 50 BID, statin  -start ARB after kidney function improves  -maintain K>4, Mg>2  -monitor on tele  -Cardiology following, to have cardiac cath Presents with NSTEMI, EKG shows old LBBB. Previous cardiac cath showed mild CAD  -s/p ASA/Plavix load  -continue ASA 81, plavix, heparin gtt, metoprolol tartrate 50 BID, statin  -Tyrone from OSH peaked, will stop trending for now    -start ARB after kidney function improves  -maintain K>4, Mg>2  -monitor on tele  -Cardiology following, plan for eventual LHC once medically optimized

## 2019-03-07 NOTE — PROGRESS NOTE ADULT - SUBJECTIVE AND OBJECTIVE BOX
Patient is a 73y old  Female who presents with a chief complaint of NSTEMI , c/o sob , chest pressure (06 Mar 2019 20:41)    Interval History: No acute overnight events. Reports some weakness this morning, suspects her blood glucose is low at present, found to be 50 on fingerstick and given apple juice x 2. Symptoms improved, tolerating breakfast. NAD.     REVIEW OF SYSTEMS  Constitutional: No fever, chills, fatigue  Neuro: No headache, numbness, weakness  Resp: No shortness of breath, admits to cough and wheeze  CVS: No chest pain, palpitations  GI: No abdominal pain, nausea, vomiting, diarrhea   : No dysuria  Skin: No itching, burning  Msk: No joint pain or swelling      T(F): 97.9 (03-07-19 @ 07:47), Max: 98.9 (03-06-19 @ 13:45)  HR: 79 (03-07-19 @ 07:54) (79 - 140)  BP: 152/66 (03-07-19 @ 06:00) (86/50 - 185/79)  RR: 24 (03-07-19 @ 06:00) (16 - 51)  SpO2: 97% (03-07-19 @ 07:54) (90% - 100%)  Wt(kg): --      CAPILLARY BLOOD GLUCOSE      POCT Blood Glucose.: 82 mg/dL (07 Mar 2019 08:31)  POCT Blood Glucose.: 52 mg/dL (07 Mar 2019 08:08)  POCT Blood Glucose.: 48 mg/dL (07 Mar 2019 08:06)  POCT Blood Glucose.: 106 mg/dL (06 Mar 2019 21:09)  POCT Blood Glucose.: 117 mg/dL (06 Mar 2019 17:37)  POCT Blood Glucose.: 277 mg/dL (06 Mar 2019 12:29)    I&O's Summary    03-06 @ 07:01  -  03-07 @ 07:00  --------------------------------------------------------  IN: 592.5 mL / OUT: 1700 mL / NET: -1107.5 mL      PHYSICAL EXAM  General: elderly  female, well developed, well groomed, NAD  CNS: A&Ox3, moving all 4 extremities  HEENT: LONI, MMM  Resp: Diffuse end expiratory wheezing  CVS: RRR, S1/S2, no murmurs, rubs gallops  Abd: Obese, soft, NT ND  Ext: 1+ pitting edema b/l LE, radial, DP pulses 2+ b/l, extremities warm and well perfused  Skin: warm, dry    MEDICATIONS  oseltamivir Oral    metoprolol tartrate Oral    atorvastatin Oral  dextrose 40% Gel Oral PRN  dextrose 50% Injectable IV Push  dextrose 50% Injectable IV Push  dextrose 50% Injectable IV Push  glucagon  Injectable IntraMuscular PRN  insulin glargine Injectable (LANTUS) SubCutaneous  insulin lispro (HumaLOG) corrective regimen sliding scale SubCutaneous  insulin lispro (HumaLOG) corrective regimen sliding scale SubCutaneous    ALBUTerol/ipratropium for Nebulization Nebulizer  guaiFENesin   Syrup  (Sugar-Free) Oral PRN        aspirin enteric coated Oral  clopidogrel Tablet Oral  heparin  Infusion. IV Continuous  heparin  Injectable IV Push PRN        dextrose 5% + sodium chloride 0.45%. IV Continuous  dextrose 5%. IV Continuous    influenza   Vaccine IntraMuscular    latanoprost 0.005% Ophthalmic Solution Both EYES                            13.4   10.81 )-----------( 281      ( 07 Mar 2019 05:20 )             42.2       03-07    142  |  101  |  31<H>  ----------------------------<  48<L>  4.2   |  33<H>  |  1.50<H>    Ca    8.9      07 Mar 2019 05:20  Phos  4.7     03-07  Mg     2.1     03-07    TPro  7.9  /  Alb  3.4  /  TBili  0.3  /  DBili  .10  /  AST  64<H>  /  ALT  32  /  AlkPhos  83  03-07      CARDIAC MARKERS ( 07 Mar 2019 05:20 )  6.020 ng/mL / x     / 646 U/L / x     / 8.8 ng/mL  CARDIAC MARKERS ( 06 Mar 2019 19:30 )  4.030 ng/mL / x     / 406 U/L / x     / 12.2 ng/mL  CARDIAC MARKERS ( 06 Mar 2019 10:54 )  .296 ng/mL / x     / x     / x     / 3.6 ng/mL      PT/INR - ( 06 Mar 2019 10:54 )   PT: 13.4 sec;   INR: 1.18 ratio         PTT - ( 07 Mar 2019 03:34 )  PTT:97.2 sec          Radiology: < from: Xray Chest 1 View- PORTABLE-Urgent (03.06.19 @ 10:38) >    EXAM:  XR CHEST PORTABLE URGENT 1V                            PROCEDURE DATE:  03/06/2019          INTERPRETATION:  Clinical information: Dyspnea.    Technique: Frontal view of the chest.    comparison: None available.    Findings: The lungs are clear. The heart size is enlarged. There are mild   multilevel degenerative changes of the thoracic spine.    IMPRESSION: Cardiomegaly.      EMILY RENEE M.D., ATTENDING RADIOLOGIST  This document has been electronically signed. Mar  6 2019 10:44AM    < end of copied text >      CENTRAL LINE: N          DATE INSERTED:              REMOVE: Y/N  SHAFER: N                        DATE INSERTED:              REMOVE: Y/N  A-LINE: N                       DATE INSERTED:              REMOVE: Y/N    GLOBAL ISSUE/BEST PRACTICE  Analgesia: n  Sedation: n  HOB elevation: Y  Stress ulcer prophylaxis: n  VTE prophylaxis: heparin gtt  Glycemic control: lantus, ISS  Nutrition: NPO    CODE STATUS: Full

## 2019-03-07 NOTE — H&P ADULT - PROBLEM SELECTOR PLAN 6
-Tamiflu 5-day course, EOT 3/11  -cough symptom control A1c 6.8. Home regimen lantus 68u qhs, glimepiride, Januvia, metformin.  -pt has episodes of hypoglycemia today while NPO  -hold basal insulin  -MAN, start coverage as needed A1c 6.8. Home regimen lantus 68u qhs, glimepiride, Januvia, metformin.  -pt has episodes of hypoglycemia to 48 today after received lantus 30u the night prior  -lantus 10u qhs  -MAN QTc 528 (3/07)  -avoid QTc-prolonging meds  -monitor K, Mg, Ca  -monitor QTc

## 2019-03-07 NOTE — PROGRESS NOTE ADULT - REASON FOR ADMISSION
NSTEMI , c/o sob , chest pressure

## 2019-03-07 NOTE — H&P ADULT - PMH
Asthma    Diabetes mellitus    Glaucoma    Hyperlipidemia    Hypertension    Uterine cancer Aortic stenosis  mod-severe  Asthma    Coronary artery disease    Diabetes mellitus    Glaucoma    Hyperlipidemia    Hypertension    Uterine cancer  s/p RT and MAYNOR-BSO

## 2019-03-07 NOTE — PROGRESS NOTE ADULT - ATTENDING COMMENTS
74 y/o female from home, PMH HTN, DM2 (on insulin), HLD, well controlled asthma, now admitted with acute hypoxic respiratory failure due to acute pulmonary edema and acute systolic and diastolic heart failure, NSTEMI and Influenza A.     Doing better  Stayed off BiPAP since yesterday afternoon    Plan:  --Cardiac: continue aspirin, Plavix, iv heparin, atorvastatin, metoprolol (increase dose), denies chest pain/pressure today, ECHO reviewed, to be transferred to [facility] for cardiac cath  --Pulm: resp failure resolved, hold Lasix today in view of worsening BUN, Cr - will use PRN, continue BDs  --GI: cath unlikely to happen today, resume diet  --Renal: monitor Cr, avoid nephrotoxic meds  --ID: Tamiflu 30mg q12 x 5days   --Endo: hypoglycemia this am - decrease Lantus to 10units qhs, d/c premeal Humalog, started on D5 earlier as she was NPO - will d/c now that diet is being resumed  --Eyes: continue latanoprost & Simbrinza for glaucoma  --Stable for tele/transfer to [facility]
OK to downgrade patient to telemetry
Note written by attending, see above.  Time spent: 45min. More than 50% of the visit was spent counseling the patient /pt's family on her condition - NSTEMI, influenza infection, cardiac cath.

## 2019-03-07 NOTE — PROGRESS NOTE ADULT - PROBLEM SELECTOR PLAN 2
-likely due to acute MI -- NSTEMI as per cardio  -improving pulm edema with diuresis  -for cardiac cath today  -f/up TTE

## 2019-03-07 NOTE — PROGRESS NOTE ADULT - PROBLEM SELECTOR PLAN 3
Plavix/ASA loaded. On heparin drip ACS protocol  - troponins trended up and peaked at ~6 ; CK still trending up.  - cardio ([facility]) aware. Plan for cath today   - c/w lipitor and metoprolol

## 2019-03-07 NOTE — H&P ADULT - ATTENDING COMMENTS
Patient assigned to me by night hospitalist in charge for management and care for patient for this evening only. Care to be resumed by day hospitalist in the morning and thereafter.     Pt seen and examined. Case d/w house staff Dr. Lobo. Agree with assessment and plan, with changes made where appropriate.

## 2019-03-07 NOTE — PROGRESS NOTE ADULT - ASSESSMENT
Problem/Recommendation - 1:  Problem: Heart Failure with reduced EF (Sysotlic and Diastolic HF). Recommendation: Continue Lasix. Eventually switch from lopressor to Toprol XL. Recommend adding ACEi/ARB  SOB is improving  Monitor I/O and daily weight   Monitor renal function and lytes  Supportive care , treat flu and bronchodilators  for asthma.    Problem/Recommendation - 2:  ·  Problem: NSTEMI (non-ST elevated myocardial infarction).  Recommendation: c/o chest pressure and SOB ,  Trop peak at 6, Serial troponin q 6 hrs , trend CE   EKG : NSR , LBBB9 (not new )   Obtain Echo  Continue ASA , Plavix 300mg Loaded , 75 mg daily , Lipitor 40mg , Metoprolol   Therapeutic anticoagulation with Heparin drip , monitor PTT  Continue cardiac monitor  Will need cardiac catheterization to evaluate for obstructive coronary artery disease and transfer arranged to [facility] .     Problem/Recommendation - 3:  ·  Problem: Influenza A.  Recommendation: Continue Oseltamivir   Monitor O2 sat and supplement as needed   Supportive care.     Problem/Recommendation - 4:  ·  Problem: Essential hypertension.  Recommendation: Continue current meds.     Problem/Recommendation - 5:  ·  Problem: Hyperlipidemia.  Recommendation: Continue Lipitor. Problem/Recommendation - 1:  Problem: Heart Failure with reduced EF (Sysotlic and Diastolic HF). Recommendation: Continue Lasix. Eventually switch from lopressor to Toprol XL. Recommend adding ACEi/ARB  SOB is improving  Monitor I/O and daily weight   Monitor renal function and lytes  Supportive care , treat flu and bronchodilators  for asthma.    Problem/Recommendation - 2:  ·  Problem: NSTEMI (non-ST elevated myocardial infarction).  Recommendation: Chest pain free and SOB much improved  Trop peak at 6, Serial troponin q 6 hrs , trend CE   EKG : NSR , LBBB9 (not new )   Obtain Echo  Continue ASA , Plavix 300mg Loaded , 75 mg daily , Lipitor 40mg , Metoprolol   Therapeutic anticoagulation with Heparin drip , monitor PTT  Continue cardiac monitor  Will need cardiac catheterization to evaluate for obstructive coronary artery disease and transfer arranged to [facility] .     Problem/Recommendation - 3:  ·  Problem: Influenza A.  Recommendation: Continue Oseltamivir   Monitor O2 sat and supplement as needed   Supportive care.     Problem/Recommendation - 4:  ·  Problem: Essential hypertension.  Recommendation: Continue current meds.     Problem/Recommendation - 5:  ·  Problem: Hyperlipidemia.  Recommendation: Continue Lipitor.

## 2019-03-08 DIAGNOSIS — I50.21 ACUTE SYSTOLIC (CONGESTIVE) HEART FAILURE: ICD-10-CM

## 2019-03-08 DIAGNOSIS — R94.31 ABNORMAL ELECTROCARDIOGRAM [ECG] [EKG]: ICD-10-CM

## 2019-03-08 DIAGNOSIS — E11.649 TYPE 2 DIABETES MELLITUS WITH HYPOGLYCEMIA WITHOUT COMA: ICD-10-CM

## 2019-03-08 PROBLEM — I10 ESSENTIAL (PRIMARY) HYPERTENSION: Chronic | Status: ACTIVE | Noted: 2019-03-06

## 2019-03-08 PROBLEM — E78.5 HYPERLIPIDEMIA, UNSPECIFIED: Chronic | Status: ACTIVE | Noted: 2019-03-06

## 2019-03-08 PROBLEM — E11.9 TYPE 2 DIABETES MELLITUS WITHOUT COMPLICATIONS: Chronic | Status: ACTIVE | Noted: 2019-03-06

## 2019-03-08 PROBLEM — C55 MALIGNANT NEOPLASM OF UTERUS, PART UNSPECIFIED: Chronic | Status: ACTIVE | Noted: 2019-03-06

## 2019-03-08 LAB
ANION GAP SERPL CALC-SCNC: 16 MMOL/L — SIGNIFICANT CHANGE UP (ref 5–17)
APTT BLD: 57.6 SEC — HIGH (ref 27.5–36.3)
APTT BLD: 58.3 SEC — HIGH (ref 27.5–36.3)
BASOPHILS # BLD AUTO: 0.05 K/UL — SIGNIFICANT CHANGE UP (ref 0–0.2)
BASOPHILS NFR BLD AUTO: 0.6 % — SIGNIFICANT CHANGE UP (ref 0–2)
BLD GP AB SCN SERPL QL: NEGATIVE — SIGNIFICANT CHANGE UP
BUN SERPL-MCNC: 42 MG/DL — HIGH (ref 7–23)
CALCIUM SERPL-MCNC: 9.9 MG/DL — SIGNIFICANT CHANGE UP (ref 8.4–10.5)
CHLORIDE SERPL-SCNC: 95 MMOL/L — LOW (ref 96–108)
CO2 SERPL-SCNC: 27 MMOL/L — SIGNIFICANT CHANGE UP (ref 22–31)
CREAT SERPL-MCNC: 1.76 MG/DL — HIGH (ref 0.5–1.3)
EOSINOPHIL # BLD AUTO: 0.09 K/UL — SIGNIFICANT CHANGE UP (ref 0–0.5)
EOSINOPHIL NFR BLD AUTO: 1 % — SIGNIFICANT CHANGE UP (ref 0–6)
GAS PNL BLDV: SIGNIFICANT CHANGE UP
GLUCOSE BLDC GLUCOMTR-MCNC: 159 MG/DL — HIGH (ref 70–99)
GLUCOSE BLDC GLUCOMTR-MCNC: 183 MG/DL — HIGH (ref 70–99)
GLUCOSE BLDC GLUCOMTR-MCNC: 194 MG/DL — HIGH (ref 70–99)
GLUCOSE SERPL-MCNC: 114 MG/DL — HIGH (ref 70–99)
HCT VFR BLD CALC: 44.2 % — SIGNIFICANT CHANGE UP (ref 34.5–45)
HCT VFR BLD CALC: 45.5 % — HIGH (ref 34.5–45)
HGB BLD-MCNC: 14.3 G/DL — SIGNIFICANT CHANGE UP (ref 11.5–15.5)
HGB BLD-MCNC: 14.7 G/DL — SIGNIFICANT CHANGE UP (ref 11.5–15.5)
IMM GRANULOCYTES NFR BLD AUTO: 0.2 % — SIGNIFICANT CHANGE UP (ref 0–1.5)
LYMPHOCYTES # BLD AUTO: 2.29 K/UL — SIGNIFICANT CHANGE UP (ref 1–3.3)
LYMPHOCYTES # BLD AUTO: 25.8 % — SIGNIFICANT CHANGE UP (ref 13–44)
MAGNESIUM SERPL-MCNC: 2.7 MG/DL — HIGH (ref 1.6–2.6)
MCHC RBC-ENTMCNC: 29.1 PG — SIGNIFICANT CHANGE UP (ref 27–34)
MCHC RBC-ENTMCNC: 29.7 PG — SIGNIFICANT CHANGE UP (ref 27–34)
MCHC RBC-ENTMCNC: 31.4 GM/DL — LOW (ref 32–36)
MCHC RBC-ENTMCNC: 33.3 GM/DL — SIGNIFICANT CHANGE UP (ref 32–36)
MCV RBC AUTO: 89.3 FL — SIGNIFICANT CHANGE UP (ref 80–100)
MCV RBC AUTO: 92.5 FL — SIGNIFICANT CHANGE UP (ref 80–100)
MONOCYTES # BLD AUTO: 1.49 K/UL — HIGH (ref 0–0.9)
MONOCYTES NFR BLD AUTO: 16.8 % — HIGH (ref 2–14)
NEUTROPHILS # BLD AUTO: 4.95 K/UL — SIGNIFICANT CHANGE UP (ref 1.8–7.4)
NEUTROPHILS NFR BLD AUTO: 55.6 % — SIGNIFICANT CHANGE UP (ref 43–77)
PHOSPHATE SERPL-MCNC: 4.7 MG/DL — HIGH (ref 2.5–4.5)
PLATELET # BLD AUTO: 270 K/UL — SIGNIFICANT CHANGE UP (ref 150–400)
PLATELET # BLD AUTO: 312 K/UL — SIGNIFICANT CHANGE UP (ref 150–400)
POTASSIUM SERPL-MCNC: 3.9 MMOL/L — SIGNIFICANT CHANGE UP (ref 3.5–5.3)
POTASSIUM SERPL-SCNC: 3.9 MMOL/L — SIGNIFICANT CHANGE UP (ref 3.5–5.3)
RBC # BLD: 4.92 M/UL — SIGNIFICANT CHANGE UP (ref 3.8–5.2)
RBC # BLD: 4.95 M/UL — SIGNIFICANT CHANGE UP (ref 3.8–5.2)
RBC # FLD: 12.9 % — SIGNIFICANT CHANGE UP (ref 10.3–14.5)
RBC # FLD: 13.5 % — SIGNIFICANT CHANGE UP (ref 10.3–14.5)
RH IG SCN BLD-IMP: POSITIVE — SIGNIFICANT CHANGE UP
SODIUM SERPL-SCNC: 138 MMOL/L — SIGNIFICANT CHANGE UP (ref 135–145)
WBC # BLD: 8.89 K/UL — SIGNIFICANT CHANGE UP (ref 3.8–10.5)
WBC # BLD: 8.9 K/UL — SIGNIFICANT CHANGE UP (ref 3.8–10.5)
WBC # FLD AUTO: 8.89 K/UL — SIGNIFICANT CHANGE UP (ref 3.8–10.5)
WBC # FLD AUTO: 8.9 K/UL — SIGNIFICANT CHANGE UP (ref 3.8–10.5)

## 2019-03-08 PROCEDURE — 99223 1ST HOSP IP/OBS HIGH 75: CPT | Mod: GC

## 2019-03-08 PROCEDURE — 99233 SBSQ HOSP IP/OBS HIGH 50: CPT | Mod: GC

## 2019-03-08 RX ORDER — FUROSEMIDE 40 MG
40 TABLET ORAL DAILY
Qty: 0 | Refills: 0 | Status: DISCONTINUED | OUTPATIENT
Start: 2019-03-08 | End: 2019-03-08

## 2019-03-08 RX ORDER — INSULIN LISPRO 100/ML
VIAL (ML) SUBCUTANEOUS
Qty: 0 | Refills: 0 | Status: DISCONTINUED | OUTPATIENT
Start: 2019-03-08 | End: 2019-03-19

## 2019-03-08 RX ORDER — FUROSEMIDE 40 MG
20 TABLET ORAL DAILY
Qty: 0 | Refills: 0 | Status: DISCONTINUED | OUTPATIENT
Start: 2019-03-09 | End: 2019-03-09

## 2019-03-08 RX ORDER — INSULIN LISPRO 100/ML
VIAL (ML) SUBCUTANEOUS AT BEDTIME
Qty: 0 | Refills: 0 | Status: DISCONTINUED | OUTPATIENT
Start: 2019-03-08 | End: 2019-03-19

## 2019-03-08 RX ORDER — INSULIN GLARGINE 100 [IU]/ML
10 INJECTION, SOLUTION SUBCUTANEOUS AT BEDTIME
Qty: 0 | Refills: 0 | Status: DISCONTINUED | OUTPATIENT
Start: 2019-03-08 | End: 2019-03-13

## 2019-03-08 RX ADMIN — Medication 3 MILLILITER(S): at 14:38

## 2019-03-08 RX ADMIN — Medication 30 MILLIGRAM(S): at 05:57

## 2019-03-08 RX ADMIN — DORZOLAMIDE HYDROCHLORIDE 1 DROP(S): 20 SOLUTION/ DROPS OPHTHALMIC at 22:14

## 2019-03-08 RX ADMIN — BRIMONIDINE TARTRATE 1 DROP(S): 2 SOLUTION/ DROPS OPHTHALMIC at 22:15

## 2019-03-08 RX ADMIN — Medication 3 MILLILITER(S): at 18:35

## 2019-03-08 RX ADMIN — Medication 100 MILLIGRAM(S): at 14:39

## 2019-03-08 RX ADMIN — Medication 40 MILLIGRAM(S): at 05:57

## 2019-03-08 RX ADMIN — Medication 5 MILLIGRAM(S): at 05:57

## 2019-03-08 RX ADMIN — Medication 81 MILLIGRAM(S): at 14:40

## 2019-03-08 RX ADMIN — Medication 3 MILLILITER(S): at 10:15

## 2019-03-08 RX ADMIN — Medication 100 MILLIGRAM(S): at 10:18

## 2019-03-08 RX ADMIN — HEPARIN SODIUM 650 UNIT(S)/HR: 5000 INJECTION INTRAVENOUS; SUBCUTANEOUS at 18:36

## 2019-03-08 RX ADMIN — ATORVASTATIN CALCIUM 40 MILLIGRAM(S): 80 TABLET, FILM COATED ORAL at 22:14

## 2019-03-08 RX ADMIN — Medication 2: at 18:33

## 2019-03-08 RX ADMIN — LATANOPROST 1 DROP(S): 0.05 SOLUTION/ DROPS OPHTHALMIC; TOPICAL at 22:14

## 2019-03-08 RX ADMIN — Medication 3 MILLILITER(S): at 22:14

## 2019-03-08 RX ADMIN — INSULIN GLARGINE 10 UNIT(S): 100 INJECTION, SOLUTION SUBCUTANEOUS at 22:15

## 2019-03-08 RX ADMIN — Medication 3 MILLILITER(S): at 05:56

## 2019-03-08 RX ADMIN — Medication 5: at 12:24

## 2019-03-08 RX ADMIN — Medication 50 MILLIGRAM(S): at 14:38

## 2019-03-08 RX ADMIN — HEPARIN SODIUM 650 UNIT(S)/HR: 5000 INJECTION INTRAVENOUS; SUBCUTANEOUS at 10:21

## 2019-03-08 RX ADMIN — INSULIN GLARGINE 10 UNIT(S): 100 INJECTION, SOLUTION SUBCUTANEOUS at 00:54

## 2019-03-08 RX ADMIN — BRIMONIDINE TARTRATE 1 DROP(S): 2 SOLUTION/ DROPS OPHTHALMIC at 10:15

## 2019-03-08 RX ADMIN — DORZOLAMIDE HYDROCHLORIDE 1 DROP(S): 20 SOLUTION/ DROPS OPHTHALMIC at 10:15

## 2019-03-08 RX ADMIN — CLOPIDOGREL BISULFATE 75 MILLIGRAM(S): 75 TABLET, FILM COATED ORAL at 14:40

## 2019-03-08 NOTE — PROGRESS NOTE ADULT - PROBLEM SELECTOR PLAN 2
2/2 NSTEMI, mod-severe AS, and asthma exacerbation, required BIPAP at OSH  -continue diuresis with lasix 40mg IV daily  -strict I/Os, daily weights standing  -ischemic eval per cardiology 2/2 NSTEMI, mod-severe AS, and asthma exacerbation, required BIPAP at OSH  - continue diuresis with lasix 40mg IV daily  - strict I/Os, daily weights standing  - Ischemic eval per cardiology 2/2 NSTEMI, mod-severe AS, and asthma exacerbation, required BIPAP at OSH  - decrease  lasix to 20mg IV daily given FLORIDALMA   - strict I/Os, daily weights standing  - Ischemic eval per cardiology

## 2019-03-08 NOTE — PROGRESS NOTE ADULT - ASSESSMENT
A/P: 73 year old F pt with PMH of HTN, HLD, CAD, DM2, and uterine cancer s/p MAYNOR-BSO presented as a transfer from [place] for NSTEMI in the setting of influenza A.    - clinically stable with no chest pain  - vitals stable with sinus rhythm on telemetry  - echo showing EF 25% with wall motion abnormalities, mod-severe AS  - c/w medical therapy for ACS  - pt will need to be medically optimized prior to [facility]  - consult service will continue to follow, please call with further questions    Neville Langford, #[id number]  CCU Fellow    3/8  -clinically stable  -continue diuresis  -left heart cath likely on Monday 3/11 A/P: 73 year old F pt with PMH of HTN, HLD, CAD, DM2, and uterine cancer s/p MAYNOR-BSO presented as a transfer from [place] for NSTEMI in the setting of influenza A.    - clinically stable with no chest pain  - vitals stable with sinus rhythm on telemetry  - echo showing EF 25% with wall motion abnormalities, mod-severe AS  - c/w medical therapy for ACS  - pt will need to be medically optimized prior to [facility]  - consult service will continue to follow, please call with further questions    Neville Langford, #[id number]  CCU Fellow    3/8  -clinically stable  -continue diuresis  -left heart cath likely on Monday 3/11    Sherine Restrepo DNP ANP-BC

## 2019-03-08 NOTE — PROGRESS NOTE ADULT - SUBJECTIVE AND OBJECTIVE BOX
CONTACT Jaya Sosa MD                                                                                                                                              Internal Medicine PGY-1   [facility] Pager [phone], [facility] Pager [phone]  On weekdays after 7pm and weekends after 12pm, please contact [phone]      Patient is a 73y old  Female who presents with a chief complaint of NSTEMI, acute hypoxic respiratory failure, acute decompensated heart failure, FLORIDALMA, influenza A (08 Mar 2019 01:25)      INTERVAL HPI/OVERNIGHT EVENTS:  - No acute events overnight       T(C): 36.5 (03-08-19 @ 05:20), Max: 37.1 (03-07-19 @ 15:59)  HR: 91 (03-08-19 @ 05:20) (86 - 92)  BP: 118/63 (03-08-19 @ 05:20) (118/63 - 152/68)  RR: 18 (03-08-19 @ 05:20) (17 - 24)  SpO2: 91% (03-08-19 @ 05:20) (91% - 98%)        I&O's Summary    07 Mar 2019 07:01  -  08 Mar 2019 07:00  --------------------------------------------------------  IN: 200 mL / OUT: 0 mL / NET: 200 mL        LABS:                        14.7   8.9   )-----------( 270      ( 08 Mar 2019 06:35 )             44.2     03-08    138  |  95<L>  |  42<H>  ----------------------------<  114<H>  3.9   |  27  |  1.76<H>    Ca    9.9      08 Mar 2019 06:35  Phos  4.7     03-08  Mg     2.7     03-08    TPro  7.9  /  Alb  3.4  /  TBili  0.3  /  DBili  .10  /  AST  64<H>  /  ALT  32  /  AlkPhos  83  03-07    PT/INR - ( 06 Mar 2019 10:54 )   PT: 13.4 sec;   INR: 1.18 ratio         PTT - ( 08 Mar 2019 06:34 )  PTT:58.3 sec    CAPILLARY BLOOD GLUCOSE      POCT Blood Glucose.: 115 mg/dL (08 Mar 2019 07:50)  POCT Blood Glucose.: 170 mg/dL (08 Mar 2019 00:33)  POCT Blood Glucose.: 168 mg/dL (07 Mar 2019 23:02)  POCT Blood Glucose.: 83 mg/dL (07 Mar 2019 16:47)  POCT Blood Glucose.: 134 mg/dL (07 Mar 2019 13:33)  POCT Blood Glucose.: 215 mg/dL (07 Mar 2019 09:16)  POCT Blood Glucose.: 82 mg/dL (07 Mar 2019 08:31)  POCT Blood Glucose.: 52 mg/dL (07 Mar 2019 08:08)          HOSPITAL MEDICATIONS:    MEDICATIONS  (STANDING):  ALBUTerol/ipratropium for Nebulization 3 milliLiter(s) Nebulizer every 4 hours  aspirin enteric coated 81 milliGRAM(s) Oral daily  atorvastatin 40 milliGRAM(s) Oral at bedtime  brimonidine 0.2% Ophthalmic Solution 1 Drop(s) Both EYES two times a day  clopidogrel Tablet 75 milliGRAM(s) Oral daily  dextrose 5%. 1000 milliLiter(s) (50 mL/Hr) IV Continuous <Continuous>  dextrose 50% Injectable 12.5 Gram(s) IV Push once  dextrose 50% Injectable 25 Gram(s) IV Push once  dextrose 50% Injectable 25 Gram(s) IV Push once  dorzolamide 2% Ophthalmic Solution 1 Drop(s) Both EYES three times a day  furosemide   Injectable 40 milliGRAM(s) IV Push daily  heparin  Infusion. 650 Unit(s)/Hr (6.5 mL/Hr) IV Continuous <Continuous>  insulin glargine Injectable (LANTUS) 10 Unit(s) SubCutaneous at bedtime  insulin lispro (HumaLOG) corrective regimen sliding scale   SubCutaneous three times a day before meals  insulin lispro (HumaLOG) corrective regimen sliding scale   SubCutaneous at bedtime  latanoprost 0.005% Ophthalmic Solution 1 Drop(s) Both EYES at bedtime  metoprolol tartrate 50 milliGRAM(s) Oral two times a day  oseltamivir 30 milliGRAM(s) Oral every 12 hours  oxybutynin 5 milliGRAM(s) Oral two times a day  predniSONE   Tablet 40 milliGRAM(s) Oral daily      MEDICATIONS  (PRN):  ALBUTerol    90 MICROgram(s) HFA Inhaler 1 Puff(s) Inhalation every 4 hours PRN Shortness of Breath and/or Wheezing  benzonatate 100 milliGRAM(s) Oral three times a day PRN Cough  dextrose 40% Gel 15 Gram(s) Oral once PRN Blood Glucose LESS THAN 70 milliGRAM(s)/deciliter  glucagon  Injectable 1 milliGRAM(s) IntraMuscular once PRN Glucose LESS THAN 70 milligrams/deciliter  guaiFENesin   Syrup  (Sugar-Free) 100 milliGRAM(s) Oral every 6 hours PRN Cough  heparin  Injectable 4700 Unit(s) IV Push every 6 hours PRN For aPTT less than 40      HOME MEDICATIONS  Home Medications:  Ecotrin 325 mg oral delayed release tablet: 1 tab(s) orally once a day (07 Mar 2019 23:19)  glimepiride 4 mg oral tablet: 1 tab(s) orally once a day (07 Mar 2019 23:19)  Januvia 100 mg oral tablet: 1 tab(s) orally once a day (07 Mar 2019 23:19)  Lantus 100 units/mL subcutaneous solution: 68 unit(s) subcutaneous once a day (at bedtime) (07 Mar 2019 23:19)  latanoprost 0.005% ophthalmic solution: 1 drop(s) to each affected eye once a day (at bedtime) (07 Mar 2019 23:19)  Lipitor 40 mg oral tablet: 1 tab(s) orally once a day (07 Mar 2019 23:19)  losartan 50 mg oral tablet: 1 tab(s) orally once a day (07 Mar 2019 23:19)  metFORMIN 1000 mg oral tablet: 1 tab(s) orally 2 times a day (07 Mar 2019 23:19)  Proventil HFA 90 mcg/inh inhalation aerosol: 2 puff(s) inhaled 4 times a day, As Needed (07 Mar 2019 23:19)  Simbrinza 1%- 0.2% ophthalmic suspension: 1 drop(s) to each affected eye 2 times a day (07 Mar 2019 23:19)  Singulair 10 mg oral tablet: 1 tab(s) orally once a day (07 Mar 2019 23:19)  VESIcare 10 mg oral tablet: 1 tab(s) orally once a day (07 Mar 2019 23:19)  Vitamin C 500 mg oral tablet: 1 tab(s) orally once a day (07 Mar 2019 23:19) CONTACT Jaya Sosa MD                                                                                                                                              Internal Medicine PGY-1   [facility] Pager [phone], [facility] Pager [phone]  On weekdays after 7pm and weekends after 12pm, please contact [phone]      Patient is a 73y old  Female who presents with a chief complaint of NSTEMI, acute hypoxic respiratory failure, acute decompensated heart failure, FLORIDALMA, influenza A (08 Mar 2019 01:25)      INTERVAL HPI/OVERNIGHT EVENTS:  - No acute events overnight   - Denies CP, SOB, HA, dizziness, N/V, c/o productive cough and MORTON     T(C): 36.5 (03-08-19 @ 05:20), Max: 37.1 (03-07-19 @ 15:59)  HR: 91 (03-08-19 @ 05:20) (86 - 92)  BP: 118/63 (03-08-19 @ 05:20) (118/63 - 152/68)  RR: 18 (03-08-19 @ 05:20) (17 - 24)  SpO2: 91% (03-08-19 @ 05:20) (91% - 98%)    PHYSICAL EXAM:  GENERAL: NAD, well-developed, found sitting comfortably in chair   HEAD:  Atraumatic, Normocephalic  EYES: EOMI, PERRLA, conjunctiva and sclera clear  NECK: Supple, No JVD  CHEST/LUNG: bibasilar crackles to mid lung, breathing comfortably on 2L, able to speak in full sentences; No wheeze, +dry cough   HEART: Regular rate and rhythm; No murmurs, rubs, or gallops  ABDOMEN: Soft, Nontender, Nondistended; Bowel sounds present  EXTREMITIES:  No LE edema   PSYCH: AAOx3  NEUROLOGY: non-focal  SKIN: No rashes or lesions        I&O's Summary    07 Mar 2019 07:01  -  08 Mar 2019 07:00  --------------------------------------------------------  IN: 200 mL / OUT: 0 mL / NET: 200 mL        LABS:                        14.7   8.9   )-----------( 270      ( 08 Mar 2019 06:35 )             44.2     03-08    138  |  95<L>  |  42<H>  ----------------------------<  114<H>  3.9   |  27  |  1.76<H>    Ca    9.9      08 Mar 2019 06:35  Phos  4.7     03-08  Mg     2.7     03-08    TPro  7.9  /  Alb  3.4  /  TBili  0.3  /  DBili  .10  /  AST  64<H>  /  ALT  32  /  AlkPhos  83  03-07    PT/INR - ( 06 Mar 2019 10:54 )   PT: 13.4 sec;   INR: 1.18 ratio         PTT - ( 08 Mar 2019 06:34 )  PTT:58.3 sec    CAPILLARY BLOOD GLUCOSE      POCT Blood Glucose.: 115 mg/dL (08 Mar 2019 07:50)  POCT Blood Glucose.: 170 mg/dL (08 Mar 2019 00:33)  POCT Blood Glucose.: 168 mg/dL (07 Mar 2019 23:02)  POCT Blood Glucose.: 83 mg/dL (07 Mar 2019 16:47)  POCT Blood Glucose.: 134 mg/dL (07 Mar 2019 13:33)  POCT Blood Glucose.: 215 mg/dL (07 Mar 2019 09:16)  POCT Blood Glucose.: 82 mg/dL (07 Mar 2019 08:31)  POCT Blood Glucose.: 52 mg/dL (07 Mar 2019 08:08)          HOSPITAL MEDICATIONS:    MEDICATIONS  (STANDING):  ALBUTerol/ipratropium for Nebulization 3 milliLiter(s) Nebulizer every 4 hours  aspirin enteric coated 81 milliGRAM(s) Oral daily  atorvastatin 40 milliGRAM(s) Oral at bedtime  brimonidine 0.2% Ophthalmic Solution 1 Drop(s) Both EYES two times a day  clopidogrel Tablet 75 milliGRAM(s) Oral daily  dextrose 5%. 1000 milliLiter(s) (50 mL/Hr) IV Continuous <Continuous>  dextrose 50% Injectable 12.5 Gram(s) IV Push once  dextrose 50% Injectable 25 Gram(s) IV Push once  dextrose 50% Injectable 25 Gram(s) IV Push once  dorzolamide 2% Ophthalmic Solution 1 Drop(s) Both EYES three times a day  furosemide   Injectable 40 milliGRAM(s) IV Push daily  heparin  Infusion. 650 Unit(s)/Hr (6.5 mL/Hr) IV Continuous <Continuous>  insulin glargine Injectable (LANTUS) 10 Unit(s) SubCutaneous at bedtime  insulin lispro (HumaLOG) corrective regimen sliding scale   SubCutaneous three times a day before meals  insulin lispro (HumaLOG) corrective regimen sliding scale   SubCutaneous at bedtime  latanoprost 0.005% Ophthalmic Solution 1 Drop(s) Both EYES at bedtime  metoprolol tartrate 50 milliGRAM(s) Oral two times a day  oseltamivir 30 milliGRAM(s) Oral every 12 hours  oxybutynin 5 milliGRAM(s) Oral two times a day  predniSONE   Tablet 40 milliGRAM(s) Oral daily      MEDICATIONS  (PRN):  ALBUTerol    90 MICROgram(s) HFA Inhaler 1 Puff(s) Inhalation every 4 hours PRN Shortness of Breath and/or Wheezing  benzonatate 100 milliGRAM(s) Oral three times a day PRN Cough  dextrose 40% Gel 15 Gram(s) Oral once PRN Blood Glucose LESS THAN 70 milliGRAM(s)/deciliter  glucagon  Injectable 1 milliGRAM(s) IntraMuscular once PRN Glucose LESS THAN 70 milligrams/deciliter  guaiFENesin   Syrup  (Sugar-Free) 100 milliGRAM(s) Oral every 6 hours PRN Cough  heparin  Injectable 4700 Unit(s) IV Push every 6 hours PRN For aPTT less than 40      HOME MEDICATIONS  Home Medications:  Ecotrin 325 mg oral delayed release tablet: 1 tab(s) orally once a day (07 Mar 2019 23:19)  glimepiride 4 mg oral tablet: 1 tab(s) orally once a day (07 Mar 2019 23:19)  Januvia 100 mg oral tablet: 1 tab(s) orally once a day (07 Mar 2019 23:19)  Lantus 100 units/mL subcutaneous solution: 68 unit(s) subcutaneous once a day (at bedtime) (07 Mar 2019 23:19)  latanoprost 0.005% ophthalmic solution: 1 drop(s) to each affected eye once a day (at bedtime) (07 Mar 2019 23:19)  Lipitor 40 mg oral tablet: 1 tab(s) orally once a day (07 Mar 2019 23:19)  losartan 50 mg oral tablet: 1 tab(s) orally once a day (07 Mar 2019 23:19)  metFORMIN 1000 mg oral tablet: 1 tab(s) orally 2 times a day (07 Mar 2019 23:19)  Proventil HFA 90 mcg/inh inhalation aerosol: 2 puff(s) inhaled 4 times a day, As Needed (07 Mar 2019 23:19)  Simbrinza 1%- 0.2% ophthalmic suspension: 1 drop(s) to each affected eye 2 times a day (07 Mar 2019 23:19)  Singulair 10 mg oral tablet: 1 tab(s) orally once a day (07 Mar 2019 23:19)  VESIcare 10 mg oral tablet: 1 tab(s) orally once a day (07 Mar 2019 23:19)  Vitamin C 500 mg oral tablet: 1 tab(s) orally once a day (07 Mar 2019 23:19)

## 2019-03-08 NOTE — PROGRESS NOTE ADULT - PROBLEM SELECTOR PLAN 7
A1c 6.8. Home regimen lantus 68u qhs, glimepiride, Januvia, metformin.  -pt has episodes of hypoglycemia to 48 today after received lantus 30u the night prior and was NPO  -lantus 10u qhs  -MAN A1c 6.8. Home regimen lantus 68u qhs, glimepiride, Januvia, metformin.  -pt has episodes of hypoglycemia to 48 today after received lantus 30u the night prior and was NPO  - will observe insulin requirements over the day and adjust as needed  - continue with Lantus 10u qhs and low ISS for now A1c 6.8. Home regimen lantus 68u qhs, glimepiride, Januvia, metformin.  - pt has episodes of hypoglycemia to 48 today after received lantus 30u the night prior and was NPO  - will observe insulin requirements over the day and adjust as needed  - continue with Lantus 10u qhs and changed to moderate ISS A1c 6.8. Home regimen lantus 68u qhs, glimepiride, Januvia, metformin.   - continue with Lantus 10u qhs and changed to moderate ISS

## 2019-03-08 NOTE — PROGRESS NOTE ADULT - SUBJECTIVE AND OBJECTIVE BOX
Patient seen and examined at bedside.    Overnight Events: None, patient feeling slightly better; but still coughing.   REVIEW OF SYSTEMS:  CONSTITUTIONAL: + weakness, diaphoresis and cough  EYES/ENT: No visual changes;  No dysphagia  NECK: No pain or stiffness  RESPIRATORY: + cough, no wheezing, no hemoptysis; No shortness of breath  CARDIOVASCULAR: No chest pain or palpitations; No lower extremity edema  GASTROINTESTINAL: No abdominal or epigastric pain. No nausea, vomiting, or hematemesis; No diarrhea or constipation. No melena or hematochezia.  BACK: No back pain  GENITOURINARY: No dysuria, frequency or hematuria  NEUROLOGICAL: No numbness or weakness  SKIN: No itching, burning, rashes, or lesions   All other review of systems is negative unless indicated above.            Medications:  ALBUTerol    90 MICROgram(s) HFA Inhaler 1 Puff(s) Inhalation every 4 hours PRN  ALBUTerol/ipratropium for Nebulization 3 milliLiter(s) Nebulizer every 4 hours  aspirin enteric coated 81 milliGRAM(s) Oral daily  atorvastatin 40 milliGRAM(s) Oral at bedtime  benzonatate 100 milliGRAM(s) Oral three times a day PRN  brimonidine 0.2% Ophthalmic Solution 1 Drop(s) Both EYES two times a day  clopidogrel Tablet 75 milliGRAM(s) Oral daily  dextrose 40% Gel 15 Gram(s) Oral once PRN  dextrose 5%. 1000 milliLiter(s) IV Continuous <Continuous>  dextrose 50% Injectable 12.5 Gram(s) IV Push once  dextrose 50% Injectable 25 Gram(s) IV Push once  dextrose 50% Injectable 25 Gram(s) IV Push once  dorzolamide 2% Ophthalmic Solution 1 Drop(s) Both EYES three times a day  furosemide   Injectable 40 milliGRAM(s) IV Push daily  glucagon  Injectable 1 milliGRAM(s) IntraMuscular once PRN  guaiFENesin   Syrup  (Sugar-Free) 100 milliGRAM(s) Oral every 6 hours PRN  heparin  Infusion. 650 Unit(s)/Hr IV Continuous <Continuous>  heparin  Injectable 4700 Unit(s) IV Push every 6 hours PRN  insulin glargine Injectable (LANTUS) 10 Unit(s) SubCutaneous at bedtime  insulin lispro (HumaLOG) corrective regimen sliding scale   SubCutaneous three times a day before meals  insulin lispro (HumaLOG) corrective regimen sliding scale   SubCutaneous at bedtime  latanoprost 0.005% Ophthalmic Solution 1 Drop(s) Both EYES at bedtime  metoprolol tartrate 50 milliGRAM(s) Oral two times a day      PAST MEDICAL & SURGICAL HISTORY:  Aortic stenosis: mod-severe  Coronary artery disease  Asthma  Glaucoma  Uterine cancer: s/p RT and MAYNOR-BSO  Diabetes mellitus  Hypertension  Hyperlipidemia  S/P MAYNOR-BSO (total abdominal hysterectomy and bilateral salpingo-oophorectomy)      Vitals:  T(F): 97.7 (03-08), Max: 98.8 (03-07)  HR: 91 (03-08) (86 - 92)  BP: 118/63 (03-08) (118/63 - 152/68)  RR: 18 (03-08)  SpO2: 91% (03-08)  I&O's Summary    07 Mar 2019 07:01  -  08 Mar 2019 07:00  --------------------------------------------------------  IN: 200 mL / OUT: 0 mL / NET: 200 mL    08 Mar 2019 07:01  -  08 Mar 2019 11:54  --------------------------------------------------------  IN: 420 mL / OUT: 0 mL / NET: 420 mL      Physical Exam:  Appearance: No acute distress; pale  Eyes: PERRL, EOMI, pink conjunctiva  HENT: Normal oral muscosa  Cardiovascular: RRR, S1, S2, 2/6 systolic murmur heard best @ RSB,  no rubs, or gallops; no edema; no JVD  Respiratory: Clear to auscultation bilaterally with mild decreased breath sounds at bases  Gastrointestinal: soft, non-tender, non-distended with normal bowel sounds  Musculoskeletal: No clubbing; no joint deformity   Neurologic: Non-focal  Lymphatic: No lymphadenopathy  Psychiatry: AAOx3, mood & affect appropriate  Skin: No rashes, ecchymoses, or cyanosis                          14.3   8.89  )-----------( 312      ( 08 Mar 2019 09:29 )             45.5     03-08    138  |  95<L>  |  42<H>  ----------------------------<  114<H>  3.9   |  27  |  1.76<H>    Ca    9.9      08 Mar 2019 06:35  Phos  4.7     03-08  Mg     2.7     03-08    TPro  7.9  /  Alb  3.4  /  TBili  0.3  /  DBili  .10  /  AST  64<H>  /  ALT  32  /  AlkPhos  83  03-07    PTT - ( 08 Mar 2019 06:34 )  PTT:58.3 sec  CARDIAC MARKERS ( 07 Mar 2019 17:07 )  3.950 ng/mL / x     / 863 U/L / x     / 5.6 ng/mL  CARDIAC MARKERS ( 07 Mar 2019 05:20 )  6.020 ng/mL / x     / 646 U/L / x     / 8.8 ng/mL  CARDIAC MARKERS ( 06 Mar 2019 19:30 )  4.030 ng/mL / x     / 406 U/L / x     / 12.2 ng/mL      Serum Pro-Brain Natriuretic Peptide: 5803 pg/mL (03-06 @ 10:54)  Hemoglobin A1C, Whole Blood (03.07.19 @ 08:35)    Hemoglobin A1C, Whole Blood: 6.8: Method: Immunoassay       Reference Range                4.0-5.6%       High risk (prediabetic)        5.7-6.4%       Diabetic, diagnostic             >=6.5%       ADA diabetic treatment goal       <7.0%  The Hemoglobin A1c testing is NGSP-certified.Reference ranges are based  upon the 2010 recommendations of  the American Diabetes Association.  Interpretation may vary for children  and adolescents. %

## 2019-03-08 NOTE — PROGRESS NOTE ADULT - ATTENDING COMMENTS
pt seen earlier this am. Pt denies any dyspnea but still has cough. Feels a lot better. No f/c/r. No chest pressure or atypical CP. No HA, bleeding, black stools. PE: NAD; faint bibasilar crackles; no edema; abd benign; nonfocal    NSTEMI- cont current tx. LHC per cards    Acute resp failure- cont current tx. taper o2    Acute systolic heart failure- cont diuresis. hold if creat increases pt seen earlier this am. Pt denies any dyspnea but still has cough. Feels a lot better. No f/c/r. No chest pressure or atypical CP. No HA, bleeding, black stools. PE: NAD; faint bibasilar crackles; no edema; abd benign; nonfocal    NSTEMI- cont current tx. LHC per cards    FLORIDALMA- decrease Lasix    Acute resp failure- cont current tx. taper o2    Acute systolic heart failure- cont diuresis. hold if creat increases    d/w pt

## 2019-03-08 NOTE — PROGRESS NOTE ADULT - PROBLEM SELECTOR PLAN 5
SCr 1.5 from last known baseline 1.01 (9/2013), likely hemodynamically medicated from NSTEMI/acute heart failure.  -strict I/Os, avoid nephrotoxins, renally dose meds  -monitor SCr 2/2 pulmonary edema, asthma exacerbation, and influenza A. Required BIPAP as OSH  - c/w supplemental o2, keep SpO2 >92%, currently doing well on 2LNC  - Management as above for HF and asthma exacerbation

## 2019-03-08 NOTE — PROGRESS NOTE ADULT - ASSESSMENT
72yo F with PMHx of HTN, HLD, CAD, DM2 (on Lantus, glimepiride), asthma, glaucoma, uterine cancer s/p RT and MAYNOR-BSO presents as transfer from [facility] for NSTEMI, FLORIDALMA, acute hypoxic respiratory failure 2/2 acute decompensated heart failure, asthma exacerbation, and influenza A.

## 2019-03-08 NOTE — PROGRESS NOTE ADULT - PROBLEM SELECTOR PLAN 9
continue BB, hold ARB due to FLORIDALMA QTc 528 (3/07)  - Avoid QTc-prolonging meds  - Monitor K, Mg, Ca  -monitor QTc VTE ppx: heparin gtt  Diet: DASH, CC  Dispo: PT eval

## 2019-03-08 NOTE — CONSULT NOTE ADULT - SUBJECTIVE AND OBJECTIVE BOX
Patient seen and evaluated @   Reason for consult:     HPI: 73 year old F pt with PMH of HTN, HLD, CAD, DM2, and uterine cancer s/p MAYNOR-BSO presented as a transfer from [place] for NSTEMI in the setting of influenza A. Pt noted mild chest pressure but her main complaint was more so her resp symptoms. When seen on the floor, pt was resting comfortably and answering questions appropriately. Denied any chest pain and palpitations. ROS otherwise unremarkable.    Primary Service HPI:  72yo F with PMHx of HTN, HLD, CAD, DM2 (on Lantus, glimepiride), asthma, glaucoma, uterine cancer s/p RT and MAYNOR-BSO presents as transfer from [facility] for NSTEMI, FLORIDALMA, acute hypoxic respiratory failure 2/2 acute decompensated heart failure, asthma exacerbation from influenza A. The past 6 months pt has had chest pressure and SOB when she ambulates. The CP is a pressure pain, mid chest that radiates to b/l jaw and arms, presents when she walks and resolves at rest. Her symptoms worsened to the point that getting dressed or putting on shoes would cause dizziness and SOB so she came to the ED. Pt has slept sitting up or else she feels dizzy while flat. Denies hx MI or CHF diagnosis. She developed a dry cough a week ago and noticed a sore throat today. At OSH she was found to have NSTEMI with trop-I that peaked at 6.02, EKG showed old LBBB, acute decompensated heart failure requiring BIPAP with pBNP 5803, and influenza A positive. She was monitored in the ICU. She was treated with ASA/plavix load, heparin drip. ARB held for FLORIDALMA. She was diuresed with lasix IV. TTE showed LVEF 25-30%, wall motion abnormalities, elevated filling pressures, mild MR, mod-severe AS, normal RV function, mild TR. FS down to 48 today while pt was NPO. Transferred to [facility] for cardiac cath. Currently pt denies CP, she feels wheezy. Denies F/C, N/V/D, hematochezia, melena, dysuria, LE edema, abd distension. She's had 3 cardiac catheterizations in the past, the last being 2008, which showed mild CAD. (07 Mar 2019 21:57)    PMH:   Aortic stenosis  Coronary artery disease  Asthma  Glaucoma  Uterine cancer  Diabetes mellitus  Hypertension  Hyperlipidemia    PSH:   S/P MAYNOR-BSO (total abdominal hysterectomy and bilateral salpingo-oophorectomy)  S/P MAYNOR (total abdominal hysterectomy)    Medications:   ALBUTerol    90 MICROgram(s) HFA Inhaler 1 Puff(s) Inhalation every 4 hours PRN  ALBUTerol/ipratropium for Nebulization 3 milliLiter(s) Nebulizer every 4 hours  aspirin enteric coated 81 milliGRAM(s) Oral daily  atorvastatin 40 milliGRAM(s) Oral at bedtime  benzonatate 100 milliGRAM(s) Oral three times a day PRN  brimonidine 0.2% Ophthalmic Solution 1 Drop(s) Both EYES two times a day  clopidogrel Tablet 75 milliGRAM(s) Oral daily  dextrose 40% Gel 15 Gram(s) Oral once PRN  dextrose 5%. 1000 milliLiter(s) IV Continuous <Continuous>  dextrose 50% Injectable 12.5 Gram(s) IV Push once  dextrose 50% Injectable 25 Gram(s) IV Push once  dextrose 50% Injectable 25 Gram(s) IV Push once  dorzolamide 2% Ophthalmic Solution 1 Drop(s) Both EYES three times a day  furosemide   Injectable 40 milliGRAM(s) IV Push daily  glucagon  Injectable 1 milliGRAM(s) IntraMuscular once PRN  guaiFENesin   Syrup  (Sugar-Free) 100 milliGRAM(s) Oral every 6 hours PRN  heparin  Infusion. 650 Unit(s)/Hr IV Continuous <Continuous>  heparin  Injectable 4700 Unit(s) IV Push every 6 hours PRN  insulin glargine Injectable (LANTUS) 10 Unit(s) SubCutaneous at bedtime  insulin lispro (HumaLOG) corrective regimen sliding scale   SubCutaneous three times a day before meals  insulin lispro (HumaLOG) corrective regimen sliding scale   SubCutaneous at bedtime  latanoprost 0.005% Ophthalmic Solution 1 Drop(s) Both EYES at bedtime  metoprolol tartrate 50 milliGRAM(s) Oral two times a day  oseltamivir 30 milliGRAM(s) Oral every 12 hours  oxybutynin 5 milliGRAM(s) Oral two times a day  predniSONE   Tablet 40 milliGRAM(s) Oral daily    Allergies:  No Known Allergies    FAMILY HISTORY:  Family history of type 2 diabetes mellitus (Father)    Social History:  Smoking:  Alcohol:  Drugs:    Review of Systems:  Constitutional: [ ] Fever [ ] Chills [ ] Fatigue [ ] Weight change   HEENT: [ ] Blurred vision [ ] Eye Pain [ ] Headache [ ] Runny nose [ ] Sore Throat   Respiratory: [x] Cough [ ] Wheezing [x] Shortness of breath  Cardiovascular: [x] Chest pressure [ ] Palpitations [ ] MORTON [ ] PND [ ] Orthopnea  Gastrointestinal: [ ] Abdominal Pain [ ] Diarrhea [ ] Constipation [ ] Hemorrhoids [ ] Nausea [ ] Vomiting  Genitourinary: [ ] Nocturia [ ] Dysuria [ ] Incontinence  Extremities: [ ] Swelling [ ] Joint Pain  Neurologic: [ ] Focal deficit [ ] Paresthesias [ ] Syncope  Lymphatic: [ ] Swelling [ ] Lymphadenopathy   Skin: [ ] Rash [ ] Ecchymoses [ ] Wounds [ ] Lesions  Psychiatry: [ ] Depression [ ] Suicidal/Homicidal Ideation [ ] Anxiety [ ] Sleep Disturbances  [ ] 10 point review of systems is otherwise negative except as mentioned above            [ ]Unable to obtain    Physical Exam:  T(C): 36.8 (03-07-19 @ 21:10), Max: 37.1 (03-07-19 @ 15:59)  HR: 86 (03-07-19 @ 21:10) (78 - 92)  BP: 136/81 (03-07-19 @ 21:10) (120/67 - 153/70)  RR: 18 (03-07-19 @ 21:10) (17 - 32)  SpO2: 95% (03-07-19 @ 21:10) (95% - 100%)  Wt(kg): --    03-07 @ 07:01  -  03-08 @ 01:26  --------------------------------------------------------  IN: 200 mL / OUT: 0 mL / NET: 200 mL      Daily     Daily     Appearance: NAD  Eyes: PERRL, EOMI  HENT: Normal oral mucosa, NC/AT  Cardiovascular: normal S1 and S2, RRR, no m/r/g, no edema, normal JVP  Procedural Access Site:  No hematoma, non-tender to palpation, 2+ pulses distally, no bruit, no ecchymosis  Respiratory: Coarse breath sounds  Gastrointestinal: Soft, non-tender, non-distended, BS+  Musculoskeletal: No clubbing, no joint deformity   Neurologic: Non-focal  Lymphatic: No lymphadenopathy  Psychiatry: AAOx3, mood & affect appropriate  Skin: No rashes, no ecchymoses, no cyanosis    Cardiovascular Diagnostic Testing:  ECG: Sinus rhythm    Echo:    Stress Testing:    Cath:    Interpretation of Telemetry:    Imaging:    Labs:                        13.4   10.81 )-----------( 281      ( 07 Mar 2019 05:20 )             42.2     03-07    139  |  99  |  34<H>  ----------------------------<  79  3.8   |  32<H>  |  1.40<H>    Ca    8.6      07 Mar 2019 17:07  Phos  4.7     03-07  Mg     2.1     03-07    TPro  7.9  /  Alb  3.4  /  TBili  0.3  /  DBili  .10  /  AST  64<H>  /  ALT  32  /  AlkPhos  83  03-07    PT/INR - ( 06 Mar 2019 10:54 )   PT: 13.4 sec;   INR: 1.18 ratio         PTT - ( 07 Mar 2019 17:07 )  PTT:57.0 sec  CARDIAC MARKERS ( 07 Mar 2019 17:07 )  3.950 ng/mL / x     / 863 U/L / x     / 5.6 ng/mL  CARDIAC MARKERS ( 07 Mar 2019 05:20 )  6.020 ng/mL / x     / 646 U/L / x     / 8.8 ng/mL  CARDIAC MARKERS ( 06 Mar 2019 19:30 )  4.030 ng/mL / x     / 406 U/L / x     / 12.2 ng/mL  CARDIAC MARKERS ( 06 Mar 2019 10:54 )  .296 ng/mL / x     / x     / x     / 3.6 ng/mL      Serum Pro-Brain Natriuretic Peptide: 5803 pg/mL (03-06 @ 10:54)      Hemoglobin A1C, Whole Blood: 6.8 % (03-07 @ 08:35)

## 2019-03-08 NOTE — PROGRESS NOTE ADULT - PROBLEM SELECTOR PLAN 3
Improving. 2/2 pulmonary edema, asthma exacerbation, flu. Required BIPAP as OSH  -supplemental oxygen to maintain sat >92%, currently doing well on 3LNC  -see plan for HF and asthma exacerbation 2/2 influenza A infection  - supplemental oxygen as needed to maintain sat >92%  - c/w prednisone 40mg daily x 5d - day 2   - Duoneb q4h 2/2 influenza A infection  - supplemental oxygen as needed to maintain sat >92%  - decreasing prednisone to 20mg daily for 2 more days to complete 5-day course   - Duoneb q4h

## 2019-03-08 NOTE — CONSULT NOTE ADULT - REASON FOR ADMISSION
NSTEMI, acute hypoxic respiratory failure, acute decompensated heart failure, FLORIDALMA, influenza A

## 2019-03-08 NOTE — PROGRESS NOTE ADULT - PROBLEM SELECTOR PLAN 1
Presents with NSTEMI, EKG shows old LBBB. Previous cardiac cath showed mild CAD  -s/p ASA/Plavix load  -continue ASA 81, plavix, heparin gtt, metoprolol tartrate 50 BID, statin  -Tyrone from OSH peaked, will stop trending for now    -start ARB after kidney function improves  -maintain K>4, Mg>2  -monitor on tele  -Cardiology following, plan for eventual LHC once medically optimized P/w NSTEMI, EKG shows old LBBB. Previous cardiac cath showed mild CAD  - Cards following - continue to medically optimize for cardiac catheterization  - s/p ASA/Plavix load, c/w ASA81, plavix, heparin gtt, metoprolol tartrate 50 BID, statin  - Tyrone from OSH peaked, will stop trending for now    - start ARB after kidney function improves  - Trend bmp, keep K>4, Mg>2  - Continue to monitor on telemetry

## 2019-03-08 NOTE — CONSULT NOTE ADULT - ASSESSMENT
A/P: 73 year old F pt with PMH of HTN, HLD, CAD, DM2, and uterine cancer s/p MAYNOR-BSO presented as a transfer from [place] for NSTEMI in the setting of influenza A.    - clinically stable with no chest pain  - vitals stable with sinus rhythm on telemetry  - echo showing EF 25% with wall motion abnormalities, mod-severe AS  - c/w medical therapy for ACS  - pt will need to be medically optimized prior to [facility]  - consult service will continue to follow, please call with further questions    Neville Langford, #[id number]  CCU Fellow

## 2019-03-08 NOTE — CONSULT NOTE ADULT - ATTENDING COMMENTS
Patient interviewed and examined. I was physically present for the essential portions of the E/M service provided.  Chart reviewed and note edited where appropriate.  Case discussed with fellow.  Agree w/ Assessment and Plan as outlined.    Pb Morales MD [facility]  Spectra:  [phone]  Office: [phone]

## 2019-03-08 NOTE — PROGRESS NOTE ADULT - PROBLEM SELECTOR PLAN 6
QTc 528 (3/07)  -avoid QTc-prolonging meds  -monitor K, Mg, Ca  -monitor QTc RVP positive   - Tamiflu 5-day course, EOT 3/11  - cough symptom control RVP positive   - Tamiflu 5-day course, EOT 3/10  - D/w pharmacy, dose reduced to 3/9 to once daily due to poor CrCl  - cough symptom control RVP positive   - Tamiflu 5-day course, EOT 3/10 (pt started tx at OSH)  - D/w pharmacy, dose reduced to 30mg once daily due to poor CrCl  - cough symptom control

## 2019-03-08 NOTE — PROGRESS NOTE ADULT - PROBLEM SELECTOR PLAN 4
2/2 influenza A infection  -supplemental oxygen as needed to maintain sat >92%  -start prednisone 40mg daily x 5d  -duoneb q4 SCr 1.5 from last known baseline 1.01 (9/2013), likely 2/2 low perfusion in setting of NSTEMI/acute heart failure.  - Cr uptrending  - Continue to monitor, daily BMP   - Will likely improved as cardiac function is optimized  - strict I/Os, avoid nephrotoxins, renally dose meds SCr 1.5 from last known baseline 1.01 (9/2013), likely 2/2 low perfusion in setting of NSTEMI/acute heart failure.  - Continue to monitor, daily BMP   - Will likely improved as cardiac function is optimized  - strict I/Os, avoid nephrotoxins, renally dose meds SCr 1.5 from last known baseline 1.01 (9/2013), likely 2/2 low perfusion in setting of NSTEMI/acute heart failure.  - dc'ed oxybutin as can cause retention  - Bladder scan 178cc   - Continue to monitor, daily BMP   - Will likely improved as cardiac function is optimized  - strict I/Os, avoid nephrotoxins, renally dose meds

## 2019-03-09 LAB
ANION GAP SERPL CALC-SCNC: 17 MMOL/L — SIGNIFICANT CHANGE UP (ref 5–17)
APPEARANCE UR: CLEAR — SIGNIFICANT CHANGE UP
APTT BLD: 44.7 SEC — HIGH (ref 27.5–36.3)
BILIRUB UR-MCNC: NEGATIVE — SIGNIFICANT CHANGE UP
BUN SERPL-MCNC: 72 MG/DL — HIGH (ref 7–23)
CALCIUM SERPL-MCNC: 9.5 MG/DL — SIGNIFICANT CHANGE UP (ref 8.4–10.5)
CHLORIDE SERPL-SCNC: 97 MMOL/L — SIGNIFICANT CHANGE UP (ref 96–108)
CK MB BLD-MCNC: 0.8 % — SIGNIFICANT CHANGE UP (ref 0–3.5)
CK MB CFR SERPL CALC: 3.2 NG/ML — SIGNIFICANT CHANGE UP (ref 0–3.8)
CK SERPL-CCNC: 422 U/L — HIGH (ref 25–170)
CO2 SERPL-SCNC: 27 MMOL/L — SIGNIFICANT CHANGE UP (ref 22–31)
COLOR SPEC: SIGNIFICANT CHANGE UP
CREAT ?TM UR-MCNC: 124 MG/DL — SIGNIFICANT CHANGE UP
CREAT SERPL-MCNC: 2.32 MG/DL — HIGH (ref 0.5–1.3)
DIFF PNL FLD: ABNORMAL
GLUCOSE BLDC GLUCOMTR-MCNC: 110 MG/DL — HIGH (ref 70–99)
GLUCOSE BLDC GLUCOMTR-MCNC: 142 MG/DL — HIGH (ref 70–99)
GLUCOSE BLDC GLUCOMTR-MCNC: 198 MG/DL — HIGH (ref 70–99)
GLUCOSE BLDC GLUCOMTR-MCNC: 361 MG/DL — HIGH (ref 70–99)
GLUCOSE SERPL-MCNC: 101 MG/DL — HIGH (ref 70–99)
GLUCOSE UR QL: NEGATIVE — SIGNIFICANT CHANGE UP
HCT VFR BLD CALC: 44.2 % — SIGNIFICANT CHANGE UP (ref 34.5–45)
HGB BLD-MCNC: 14 G/DL — SIGNIFICANT CHANGE UP (ref 11.5–15.5)
KETONES UR-MCNC: NEGATIVE — SIGNIFICANT CHANGE UP
LEUKOCYTE ESTERASE UR-ACNC: NEGATIVE — SIGNIFICANT CHANGE UP
MAGNESIUM SERPL-MCNC: 2.8 MG/DL — HIGH (ref 1.6–2.6)
MCHC RBC-ENTMCNC: 29.4 PG — SIGNIFICANT CHANGE UP (ref 27–34)
MCHC RBC-ENTMCNC: 31.7 GM/DL — LOW (ref 32–36)
MCV RBC AUTO: 92.7 FL — SIGNIFICANT CHANGE UP (ref 80–100)
NITRITE UR-MCNC: NEGATIVE — SIGNIFICANT CHANGE UP
PH UR: 5.5 — SIGNIFICANT CHANGE UP (ref 5–8)
PHOSPHATE SERPL-MCNC: 5.1 MG/DL — HIGH (ref 2.5–4.5)
PLATELET # BLD AUTO: 321 K/UL — SIGNIFICANT CHANGE UP (ref 150–400)
POTASSIUM SERPL-MCNC: 3.9 MMOL/L — SIGNIFICANT CHANGE UP (ref 3.5–5.3)
POTASSIUM SERPL-SCNC: 3.9 MMOL/L — SIGNIFICANT CHANGE UP (ref 3.5–5.3)
PROT UR-MCNC: ABNORMAL
RBC # BLD: 4.77 M/UL — SIGNIFICANT CHANGE UP (ref 3.8–5.2)
RBC # FLD: 13.3 % — SIGNIFICANT CHANGE UP (ref 10.3–14.5)
SODIUM SERPL-SCNC: 141 MMOL/L — SIGNIFICANT CHANGE UP (ref 135–145)
SODIUM UR-SCNC: 48 MMOL/L — SIGNIFICANT CHANGE UP
SP GR SPEC: 1.02 — SIGNIFICANT CHANGE UP (ref 1.01–1.02)
TROPONIN T, HIGH SENSITIVITY RESULT: 377 NG/L — HIGH (ref 0–51)
UROBILINOGEN FLD QL: NEGATIVE — SIGNIFICANT CHANGE UP
UUN UR-MCNC: 1167 MG/DL — SIGNIFICANT CHANGE UP
WBC # BLD: 11.42 K/UL — HIGH (ref 3.8–10.5)
WBC # FLD AUTO: 11.42 K/UL — HIGH (ref 3.8–10.5)

## 2019-03-09 PROCEDURE — 99233 SBSQ HOSP IP/OBS HIGH 50: CPT | Mod: GC

## 2019-03-09 RX ORDER — HEPARIN SODIUM 5000 [USP'U]/ML
5000 INJECTION INTRAVENOUS; SUBCUTANEOUS EVERY 12 HOURS
Qty: 0 | Refills: 0 | Status: DISCONTINUED | OUTPATIENT
Start: 2019-03-09 | End: 2019-03-19

## 2019-03-09 RX ORDER — NYSTATIN CREAM 100000 [USP'U]/G
1 CREAM TOPICAL
Qty: 0 | Refills: 0 | Status: DISCONTINUED | OUTPATIENT
Start: 2019-03-09 | End: 2019-03-19

## 2019-03-09 RX ORDER — INFLUENZA VIRUS VACCINE 15; 15; 15; 15 UG/.5ML; UG/.5ML; UG/.5ML; UG/.5ML
0.5 SUSPENSION INTRAMUSCULAR ONCE
Qty: 0 | Refills: 0 | Status: DISCONTINUED | OUTPATIENT
Start: 2019-03-09 | End: 2019-03-19

## 2019-03-09 RX ADMIN — Medication 81 MILLIGRAM(S): at 12:39

## 2019-03-09 RX ADMIN — Medication 50 MILLIGRAM(S): at 17:02

## 2019-03-09 RX ADMIN — Medication 10: at 12:38

## 2019-03-09 RX ADMIN — Medication 100 MILLIGRAM(S): at 17:03

## 2019-03-09 RX ADMIN — BRIMONIDINE TARTRATE 1 DROP(S): 2 SOLUTION/ DROPS OPHTHALMIC at 06:17

## 2019-03-09 RX ADMIN — LATANOPROST 1 DROP(S): 0.05 SOLUTION/ DROPS OPHTHALMIC; TOPICAL at 22:10

## 2019-03-09 RX ADMIN — ATORVASTATIN CALCIUM 40 MILLIGRAM(S): 80 TABLET, FILM COATED ORAL at 22:10

## 2019-03-09 RX ADMIN — Medication 100 MILLIGRAM(S): at 22:13

## 2019-03-09 RX ADMIN — DORZOLAMIDE HYDROCHLORIDE 1 DROP(S): 20 SOLUTION/ DROPS OPHTHALMIC at 06:17

## 2019-03-09 RX ADMIN — Medication 3 MILLILITER(S): at 06:16

## 2019-03-09 RX ADMIN — Medication 100 MILLIGRAM(S): at 07:51

## 2019-03-09 RX ADMIN — Medication 100 MILLIGRAM(S): at 07:52

## 2019-03-09 RX ADMIN — NYSTATIN CREAM 1 APPLICATION(S): 100000 CREAM TOPICAL at 17:16

## 2019-03-09 RX ADMIN — CLOPIDOGREL BISULFATE 75 MILLIGRAM(S): 75 TABLET, FILM COATED ORAL at 12:39

## 2019-03-09 RX ADMIN — Medication 30 MILLIGRAM(S): at 06:17

## 2019-03-09 RX ADMIN — Medication 50 MILLIGRAM(S): at 06:17

## 2019-03-09 RX ADMIN — Medication 3 MILLILITER(S): at 22:10

## 2019-03-09 RX ADMIN — HEPARIN SODIUM 5000 UNIT(S): 5000 INJECTION INTRAVENOUS; SUBCUTANEOUS at 17:16

## 2019-03-09 RX ADMIN — BRIMONIDINE TARTRATE 1 DROP(S): 2 SOLUTION/ DROPS OPHTHALMIC at 17:04

## 2019-03-09 RX ADMIN — Medication 3 MILLILITER(S): at 14:00

## 2019-03-09 RX ADMIN — Medication 3 MILLILITER(S): at 10:39

## 2019-03-09 RX ADMIN — Medication 20 MILLIGRAM(S): at 06:17

## 2019-03-09 RX ADMIN — Medication 3 MILLILITER(S): at 17:00

## 2019-03-09 RX ADMIN — DORZOLAMIDE HYDROCHLORIDE 1 DROP(S): 20 SOLUTION/ DROPS OPHTHALMIC at 22:09

## 2019-03-09 RX ADMIN — INSULIN GLARGINE 10 UNIT(S): 100 INJECTION, SOLUTION SUBCUTANEOUS at 22:10

## 2019-03-09 NOTE — PROGRESS NOTE ADULT - PROBLEM SELECTOR PLAN 8
- continue BB, hold ARB due to FLORIDALMA 2/2 influenza A infection  - supplemental oxygen as needed to maintain sat >92%  - c/w 5-day prednisone to 20mg daily, EOT 3/10 (started at OSH)  - Duoneb q4h

## 2019-03-09 NOTE — PHYSICAL THERAPY INITIAL EVALUATION ADULT - WEIGHT-BEARING RESTRICTIONS: STAND/SIT, REHAB EVAL
full weight-bearing
I will START or STAY ON the medications listed below when I get home from the hospital:    Lancets  -- Lancets needed for finger stick  -- Indication: For Type 2 diabetes mellitus with hyperglycemia, with long-term current use of insulin    Gluco monitor and finger strips  -- Glucomonitor and Finger strips for DM monitoring  -- Indication: For Type 2 diabetes mellitus without complication, with long-term current use of insulin    predniSONE 10 mg oral tablet  -- 3 tab(s) by mouth once   Take Day 1  -- Indication: For COPD exacerbation    predniSONE 10 mg oral tablet  -- 2 tab(s) by mouth once  Take Day 2  -- Indication: For COPD exacerbation    predniSONE 10 mg oral tablet  -- 1 tab(s) by mouth once   Take Day 3  -- Indication: For COPD exacerbation    acetaminophen 325 mg oral capsule  -- 1 cap(s) by mouth 3 times a day, As Needed -for moderate pain  -- Indication: For Pain    aspirin 81 mg oral delayed release tablet  -- 1 tab(s) by mouth once a day  -- Indication: For Preventive measure    acetaminophen 325 mg oral tablet  -- 2 tab(s) by mouth every 8 hours, As Needed mild pain  -- Indication: For Pain    tamsulosin 0.4 mg oral capsule  -- 1 cap(s) by mouth once a day (at bedtime)  -- Indication: For Acute urinary retention    gabapentin 100 mg oral capsule  -- 2 cap(s) by mouth 2 times a day  -- Indication: For Neuropathy    Lantus 100 units/mL subcutaneous solution  -- 15 unit(s) subcutaneous once a day (at bedtime)   -- Do not drink alcoholic beverages when taking this medication.  It is very important that you take or use this exactly as directed.  Do not skip doses or discontinue unless directed by your doctor.  Keep in refrigerator.  Do not freeze.    -- Indication: For DM (diabetes mellitus)    HumaLOG 100 units/mL injectable solution  -- 5 unit(s) injectable 3 times a day (before meals)   -- Indication: For DM (diabetes mellitus)    atorvastatin 80 mg oral tablet  -- 1 tab(s) by mouth once a day (at bedtime)  -- Indication: For CVA (cerebral vascular accident)    labetalol 200 mg oral tablet  -- 1 tab(s) by mouth 2 times a day  -- Indication: For Essential hypertension    budesonide-formoterol 160 mcg-4.5 mcg/inh inhalation aerosol  -- 2 puff(s) inhaled 2 times a day  -- Indication: For COPD exacerbation    albuterol 90 mcg/inh inhalation aerosol  -- 2 puff(s) inhaled 4 times a day  -- For inhalation only.  It is very important that you take or use this exactly as directed.  Do not skip doses or discontinue unless directed by your doctor.  Obtain medical advice before taking any non-prescription drugs as some may affect the action of this medication.  Shake well before use.    -- Indication: For COPD exacerbation    amLODIPine 10 mg oral tablet  -- 1 tab(s) by mouth once a day  -- Indication: For Essential hypertension    furosemide 40 mg oral tablet  -- 1 tab(s) by mouth once a day  -- Avoid prolonged or excessive exposure to direct and/or artificial sunlight while taking this medication.  It is very important that you take or use this exactly as directed.  Do not skip doses or discontinue unless directed by your doctor.  It may be advisable to drink a full glass orange juice or eat a banana daily while taking this medication.    -- Indication: For Acute urinary retention    docusate sodium 100 mg oral capsule  -- 1 cap(s) by mouth 3 times a day  -- Indication: For Slow transit constipation    polyethylene glycol 3350 oral powder for reconstitution  -- 17 gram(s) by mouth once a day  -- Indication: For Slow transit constipation    senna oral tablet  -- 2 tab(s) by mouth once a day (at bedtime)  -- Indication: For Slow transit constipation    montelukast 10 mg oral tablet  -- 1 tab(s) by mouth once a day  -- Indication: For COPD exacerbation    hydrALAZINE 25 mg oral tablet  -- 1 tab(s) by mouth 2 times a day  -- Indication: For Essential hypertension

## 2019-03-09 NOTE — PROGRESS NOTE ADULT - PROBLEM SELECTOR PLAN 3
2/2 influenza A infection  - supplemental oxygen as needed to maintain sat >92%  - c/w prednisone to 20mg daily through 3/10 to complete 5-day course (started at OSH)  - Duoneb q4h Worsening- last known baseline 1.01 (9/2013), likely 2/2 low perfusion in setting of NSTEMI/acute heart failure.  - Uptrending today 1.76 -->2.3 -dc lasix   - Check urine lytes (Uurea, Na and Cr)   - s/p Bladder scan yesterday 178cc   - Continue to monitor, daily BMP   - strict I/Os, avoid nephrotoxins, renally dose meds  - continue to hold home oxybutin as can cause retention Worsening- last known baseline 1.01 (9/2013), likely 2/2 low perfusion in setting of NSTEMI/acute heart failure.  - Uptrending today 1.76 -->2.3 -dc lasix   - Check urine lytes (Uurea, Na and Cr) and UA  - s/p Bladder scan yesterday 178cc   - Continue to monitor, daily BMP   - strict I/Os, avoid nephrotoxins, renally dose meds  - continue to hold home oxybutin as can cause retention Worsening- last known baseline 1.01 (9/2013), likely 2/2 low perfusion in setting of NSTEMI/acute heart failure.  - Uptrending today 1.76 -->2.3 - dc Lasix   - Check urine lytes (Uurea, Na and Cr) and UA  - s/p Bladder scan yesterday 178cc   - Continue to monitor, daily BMP   - strict I/Os, avoid nephrotoxins, renally dose meds  - continue to hold home oxybutin as can cause retention

## 2019-03-09 NOTE — PROGRESS NOTE ADULT - PROBLEM SELECTOR PLAN 2
2/2 NSTEMI, mod-severe AS, and asthma exacerbation, required BIPAP at OSH  - Hold lasix in setting of worsening FLORIDALMA    - strict I/Os, daily weights standing  - Ischemic eval per cardiology 2/2 NSTEMI, mod-severe AS, and asthma exacerbation, required BIPAP at OSH  - Unreliable I/O due to failed female urine condom, however weight unchanged  - Hold lasix starting today in setting of worsening FLORIDALMA    - strict I/Os, daily weights standing  - Ischemic eval per cardiology 2/2 NSTEMI, mod-severe AS, and asthma exacerbation, required BIPAP at OSH  - Unreliable I/O due to failed female urine condom, however weight unchanged  - Hold lasix starting today in setting of worsening FLORIDALMA    - Strict I/Os, daily weights standing  - Ischemic eval per cardiology

## 2019-03-09 NOTE — PHYSICAL THERAPY INITIAL EVALUATION ADULT - PERTINENT HX OF CURRENT PROBLEM, REHAB EVAL
73 F with PMHx of HTN, HLD, CAD, DM2 (on Lantus, glimepiride), asthma, glaucoma, uterine cancer s/p RT and MAYNOR-BSO presents as transfer from [facility] for NSTEMI, FLORIDALMA, acute hypoxic respiratory failure 2/2 acute decompensated heart failure, asthma exacerbation, and influenza A,

## 2019-03-09 NOTE — PROGRESS NOTE ADULT - PROBLEM SELECTOR PLAN 6
RVP positive   - Tamiflu 5-day course, EOT 3/10 (pt started tx at OSH)  - D/w pharmacy, dose reduced to 30mg once daily due to poor CrCl  - cough symptom control

## 2019-03-09 NOTE — PROGRESS NOTE ADULT - SUBJECTIVE AND OBJECTIVE BOX
CONTACT Jaya Sosa MD                                                                                                                                              Internal Medicine PGY-1   [facility] Pager [phone], [facility] Pager [phone]  On weekdays after 7pm and weekends after 12pm, please contact [phone]      Patient is a 73y old  Female who presents with a chief complaint of NSTEMI, acute hypoxic respiratory failure, acute decompensated heart failure, FLORIDALMA, influenza A (08 Mar 2019 11:54)      INTERVAL HPI/OVERNIGHT EVENTS:  - No acute events overnight       T(C): 36.3 (03-09-19 @ 05:26), Max: 36.8 (03-08-19 @ 20:34)  HR: 81 (03-09-19 @ 05:26) (81 - 95)  BP: 132/77 (03-09-19 @ 05:26) (122/77 - 147/59)  RR: 18 (03-09-19 @ 05:26) (18 - 18)  SpO2: 97% (03-09-19 @ 05:26) (95% - 97%)    PHYSICAL EXAM:  GENERAL: NAD, well-developed, found sitting comfortably in chair   HEAD:  Atraumatic, Normocephalic  EYES: EOMI, PERRLA, conjunctiva and sclera clear  NECK: Supple, No JVD  CHEST/LUNG: bibasilar crackles to mid lung, breathing comfortably on 2L, able to speak in full sentences; No wheeze, +dry cough   HEART: Regular rate and rhythm; No murmurs, rubs, or gallops  ABDOMEN: Soft, Nontender, Nondistended; Bowel sounds present  EXTREMITIES:  No LE edema   PSYCH: AAOx3  NEUROLOGY: non-focal  SKIN: No rashes or lesions    I&O's Summary    08 Mar 2019 07:01  -  09 Mar 2019 07:00  --------------------------------------------------------  IN: 720 mL / OUT: 200 mL / NET: 520 mL        LABS:                        14.3   8.89  )-----------( 312      ( 08 Mar 2019 09:29 )             45.5     03-09    141  |  97  |  72<H>  ----------------------------<  101<H>  3.9   |  27  |  2.32<H>    Ca    9.5      09 Mar 2019 06:30  Phos  5.1     03-09  Mg     2.8     03-09      PTT - ( 08 Mar 2019 16:20 )  PTT:57.6 sec    CAPILLARY BLOOD GLUCOSE      POCT Blood Glucose.: 110 mg/dL (09 Mar 2019 07:09)  POCT Blood Glucose.: 159 mg/dL (08 Mar 2019 21:04)  POCT Blood Glucose.: 194 mg/dL (08 Mar 2019 18:33)  POCT Blood Glucose.: 183 mg/dL (08 Mar 2019 16:57)  POCT Blood Glucose.: 355 mg/dL (08 Mar 2019 11:44)  POCT Blood Glucose.: 115 mg/dL (08 Mar 2019 07:50)          HOSPITAL MEDICATIONS:    MEDICATIONS  (STANDING):  ALBUTerol/ipratropium for Nebulization 3 milliLiter(s) Nebulizer every 4 hours  aspirin enteric coated 81 milliGRAM(s) Oral daily  atorvastatin 40 milliGRAM(s) Oral at bedtime  brimonidine 0.2% Ophthalmic Solution 1 Drop(s) Both EYES two times a day  clopidogrel Tablet 75 milliGRAM(s) Oral daily  dextrose 5%. 1000 milliLiter(s) (50 mL/Hr) IV Continuous <Continuous>  dextrose 50% Injectable 12.5 Gram(s) IV Push once  dextrose 50% Injectable 25 Gram(s) IV Push once  dextrose 50% Injectable 25 Gram(s) IV Push once  dorzolamide 2% Ophthalmic Solution 1 Drop(s) Both EYES three times a day  furosemide   Injectable 20 milliGRAM(s) IV Push daily  heparin  Infusion. 650 Unit(s)/Hr (6.5 mL/Hr) IV Continuous <Continuous>  insulin glargine Injectable (LANTUS) 10 Unit(s) SubCutaneous at bedtime  insulin lispro (HumaLOG) corrective regimen sliding scale   SubCutaneous three times a day before meals  insulin lispro (HumaLOG) corrective regimen sliding scale   SubCutaneous at bedtime  latanoprost 0.005% Ophthalmic Solution 1 Drop(s) Both EYES at bedtime  metoprolol tartrate 50 milliGRAM(s) Oral two times a day  oseltamivir 30 milliGRAM(s) Oral daily  predniSONE   Tablet 20 milliGRAM(s) Oral daily      MEDICATIONS  (PRN):  ALBUTerol    90 MICROgram(s) HFA Inhaler 1 Puff(s) Inhalation every 4 hours PRN Shortness of Breath and/or Wheezing  benzonatate 100 milliGRAM(s) Oral three times a day PRN Cough  dextrose 40% Gel 15 Gram(s) Oral once PRN Blood Glucose LESS THAN 70 milliGRAM(s)/deciliter  glucagon  Injectable 1 milliGRAM(s) IntraMuscular once PRN Glucose LESS THAN 70 milligrams/deciliter  guaiFENesin   Syrup  (Sugar-Free) 100 milliGRAM(s) Oral every 6 hours PRN Cough  heparin  Injectable 4700 Unit(s) IV Push every 6 hours PRN For aPTT less than 40      HOME MEDICATIONS  Home Medications:  Ecotrin 325 mg oral delayed release tablet: 1 tab(s) orally once a day (07 Mar 2019 23:19)  glimepiride 4 mg oral tablet: 1 tab(s) orally once a day (07 Mar 2019 23:19)  Januvia 100 mg oral tablet: 1 tab(s) orally once a day (07 Mar 2019 23:19)  Lantus 100 units/mL subcutaneous solution: 68 unit(s) subcutaneous once a day (at bedtime) (07 Mar 2019 23:19)  latanoprost 0.005% ophthalmic solution: 1 drop(s) to each affected eye once a day (at bedtime) (07 Mar 2019 23:19)  Lipitor 40 mg oral tablet: 1 tab(s) orally once a day (07 Mar 2019 23:19)  losartan 50 mg oral tablet: 1 tab(s) orally once a day (07 Mar 2019 23:19)  metFORMIN 1000 mg oral tablet: 1 tab(s) orally 2 times a day (07 Mar 2019 23:19)  Proventil HFA 90 mcg/inh inhalation aerosol: 2 puff(s) inhaled 4 times a day, As Needed (07 Mar 2019 23:19)  Simbrinza 1%- 0.2% ophthalmic suspension: 1 drop(s) to each affected eye 2 times a day (07 Mar 2019 23:19)  Singulair 10 mg oral tablet: 1 tab(s) orally once a day (07 Mar 2019 23:19)  VESIcare 10 mg oral tablet: 1 tab(s) orally once a day (07 Mar 2019 23:19)  Vitamin C 500 mg oral tablet: 1 tab(s) orally once a day (07 Mar 2019 23:19) CONTACT Jaya Sosa MD                                                                                                                                              Internal Medicine PGY-1   [facility] Pager [phone], [facility] Pager [phone]  On weekdays after 7pm and weekends after 12pm, please contact [phone]      Patient is a 73y old  Female who presents with a chief complaint of NSTEMI, acute hypoxic respiratory failure, acute decompensated heart failure, FLORIDALMA, influenza A (08 Mar 2019 11:54)      INTERVAL HPI/OVERNIGHT EVENTS:  - No acute events overnight   - Denies CP, SOB, HA, Dizziness, N/V   - c/o vaginal itchiness. It started yesterday after pt was sitting in her urine for a while after failed trial with female urine condom. No pain or abnormal odor. Unable to obtain reliable I/O as a result.       T(C): 36.3 (03-09-19 @ 05:26), Max: 36.8 (03-08-19 @ 20:34)  HR: 81 (03-09-19 @ 05:26) (81 - 95)  BP: 132/77 (03-09-19 @ 05:26) (122/77 - 147/59)  RR: 18 (03-09-19 @ 05:26) (18 - 18)  SpO2: 97% (03-09-19 @ 05:26) (95% - 97%)    PHYSICAL EXAM:  GENERAL: NAD, well-developed, found sitting comfortably in bed  HEAD:  Atraumatic, Normocephalic  EYES: EOMI, PERRLA, conjunctiva and sclera clear  NECK: Supple, No JVD  CHEST/LUNG: CTA b/l, breathing comfortably on 1.5L, able to speak in full sentences; No wheeze  HEART: Regular rate and rhythm; No murmurs, rubs, or gallops  ABDOMEN: Soft, Nontender, Nondistended; Bowel sounds present  EXTREMITIES:  No LE edema   : vulva erythematous, with scant white, odorless vaginal discharge, not clumpy or not cottage-cheese in consistency. No pain on manual vaginal exam, no cervical motion tenderness   PSYCH: AAOx3  NEUROLOGY: non-focal  SKIN: No rashes or lesions    I&O's Summary    08 Mar 2019 07:01  -  09 Mar 2019 07:00  --------------------------------------------------------  IN: 720 mL / OUT: 200 mL / NET: 520 mL        LABS:                        14.3   8.89  )-----------( 312      ( 08 Mar 2019 09:29 )             45.5     03-09    141  |  97  |  72<H>  ----------------------------<  101<H>  3.9   |  27  |  2.32<H>    Ca    9.5      09 Mar 2019 06:30  Phos  5.1     03-09  Mg     2.8     03-09      PTT - ( 08 Mar 2019 16:20 )  PTT:57.6 sec    CAPILLARY BLOOD GLUCOSE      POCT Blood Glucose.: 110 mg/dL (09 Mar 2019 07:09)  POCT Blood Glucose.: 159 mg/dL (08 Mar 2019 21:04)  POCT Blood Glucose.: 194 mg/dL (08 Mar 2019 18:33)  POCT Blood Glucose.: 183 mg/dL (08 Mar 2019 16:57)  POCT Blood Glucose.: 355 mg/dL (08 Mar 2019 11:44)  POCT Blood Glucose.: 115 mg/dL (08 Mar 2019 07:50)          HOSPITAL MEDICATIONS:    MEDICATIONS  (STANDING):  ALBUTerol/ipratropium for Nebulization 3 milliLiter(s) Nebulizer every 4 hours  aspirin enteric coated 81 milliGRAM(s) Oral daily  atorvastatin 40 milliGRAM(s) Oral at bedtime  brimonidine 0.2% Ophthalmic Solution 1 Drop(s) Both EYES two times a day  clopidogrel Tablet 75 milliGRAM(s) Oral daily  dextrose 5%. 1000 milliLiter(s) (50 mL/Hr) IV Continuous <Continuous>  dextrose 50% Injectable 12.5 Gram(s) IV Push once  dextrose 50% Injectable 25 Gram(s) IV Push once  dextrose 50% Injectable 25 Gram(s) IV Push once  dorzolamide 2% Ophthalmic Solution 1 Drop(s) Both EYES three times a day  furosemide   Injectable 20 milliGRAM(s) IV Push daily  heparin  Infusion. 650 Unit(s)/Hr (6.5 mL/Hr) IV Continuous <Continuous>  insulin glargine Injectable (LANTUS) 10 Unit(s) SubCutaneous at bedtime  insulin lispro (HumaLOG) corrective regimen sliding scale   SubCutaneous three times a day before meals  insulin lispro (HumaLOG) corrective regimen sliding scale   SubCutaneous at bedtime  latanoprost 0.005% Ophthalmic Solution 1 Drop(s) Both EYES at bedtime  metoprolol tartrate 50 milliGRAM(s) Oral two times a day  oseltamivir 30 milliGRAM(s) Oral daily  predniSONE   Tablet 20 milliGRAM(s) Oral daily      MEDICATIONS  (PRN):  ALBUTerol    90 MICROgram(s) HFA Inhaler 1 Puff(s) Inhalation every 4 hours PRN Shortness of Breath and/or Wheezing  benzonatate 100 milliGRAM(s) Oral three times a day PRN Cough  dextrose 40% Gel 15 Gram(s) Oral once PRN Blood Glucose LESS THAN 70 milliGRAM(s)/deciliter  glucagon  Injectable 1 milliGRAM(s) IntraMuscular once PRN Glucose LESS THAN 70 milligrams/deciliter  guaiFENesin   Syrup  (Sugar-Free) 100 milliGRAM(s) Oral every 6 hours PRN Cough  heparin  Injectable 4700 Unit(s) IV Push every 6 hours PRN For aPTT less than 40      HOME MEDICATIONS  Home Medications:  Ecotrin 325 mg oral delayed release tablet: 1 tab(s) orally once a day (07 Mar 2019 23:19)  glimepiride 4 mg oral tablet: 1 tab(s) orally once a day (07 Mar 2019 23:19)  Januvia 100 mg oral tablet: 1 tab(s) orally once a day (07 Mar 2019 23:19)  Lantus 100 units/mL subcutaneous solution: 68 unit(s) subcutaneous once a day (at bedtime) (07 Mar 2019 23:19)  latanoprost 0.005% ophthalmic solution: 1 drop(s) to each affected eye once a day (at bedtime) (07 Mar 2019 23:19)  Lipitor 40 mg oral tablet: 1 tab(s) orally once a day (07 Mar 2019 23:19)  losartan 50 mg oral tablet: 1 tab(s) orally once a day (07 Mar 2019 23:19)  metFORMIN 1000 mg oral tablet: 1 tab(s) orally 2 times a day (07 Mar 2019 23:19)  Proventil HFA 90 mcg/inh inhalation aerosol: 2 puff(s) inhaled 4 times a day, As Needed (07 Mar 2019 23:19)  Simbrinza 1%- 0.2% ophthalmic suspension: 1 drop(s) to each affected eye 2 times a day (07 Mar 2019 23:19)  Singulair 10 mg oral tablet: 1 tab(s) orally once a day (07 Mar 2019 23:19)  VESIcare 10 mg oral tablet: 1 tab(s) orally once a day (07 Mar 2019 23:19)  Vitamin C 500 mg oral tablet: 1 tab(s) orally once a day (07 Mar 2019 23:19) CONTACT Jaya Sosa MD                                                                                                                                              Internal Medicine PGY-1   [facility] Pager [phone], [facility] Pager [phone]  On weekdays after 7pm and weekends after 12pm, please contact [phone]    Patient is a 73y old  Female who presents with a chief complaint of NSTEMI, acute hypoxic respiratory failure, acute decompensated heart failure, FLORIDALMA, influenza A (08 Mar 2019 11:54)    INTERVAL HPI/OVERNIGHT EVENTS:  - No acute events overnight   - Denies CP, SOB, HA, Dizziness, N/V   - c/o vaginal itchiness. It started yesterday after pt was sitting in her urine for a while after failed trial with female urine condom. No pain or abnormal odor. Unable to obtain reliable I/O as a result.     T(C): 36.3 (03-09-19 @ 05:26), Max: 36.8 (03-08-19 @ 20:34)  HR: 81 (03-09-19 @ 05:26) (81 - 95)  BP: 132/77 (03-09-19 @ 05:26) (122/77 - 147/59)  RR: 18 (03-09-19 @ 05:26) (18 - 18)  SpO2: 97% (03-09-19 @ 05:26) (95% - 97%)    PHYSICAL EXAM:  GENERAL: NAD, well-developed, found sitting comfortably in bed  HEAD:  Atraumatic, Normocephalic  EYES: EOMI, PERRLA, conjunctiva and sclera clear  NECK: Supple, No JVD  CHEST/LUNG: CTA b/l, breathing comfortably on 1.5L, able to speak in full sentences; No wheeze  HEART: Regular rate and rhythm; No murmurs, rubs, or gallops  ABDOMEN: Soft, Nontender, Nondistended; Bowel sounds present  EXTREMITIES:  No LE edema   : vulva erythematous, with scant white, odorless vaginal discharge, not clumpy or not cottage-cheese in consistency. No pain on manual vaginal exam, no cervical motion tenderness   PSYCH: AAOx3  NEUROLOGY: non-focal  SKIN: No rashes or lesions    I&O's Summary  08 Mar 2019 07:01  -  09 Mar 2019 07:00  --------------------------------------------------------  IN: 720 mL / OUT: 200 mL / NET: 520 mL      LABS:                      14.0   11.42 )-----------( 321      ( 09 Mar 2019 10:41 )             44.2       03-09  141  |  97  |  72<H>  ----------------------------<  101<H>  3.9   |  27  |  2.32<H>    Ca    9.5      09 Mar 2019 06:30  Phos  5.1     03-09  Mg     2.8     03-09    CAPILLARY BLOOD GLUCOSE  POCT Blood Glucose.: 110 mg/dL (09 Mar 2019 07:09)  POCT Blood Glucose.: 159 mg/dL (08 Mar 2019 21:04)  POCT Blood Glucose.: 194 mg/dL (08 Mar 2019 18:33)  POCT Blood Glucose.: 183 mg/dL (08 Mar 2019 16:57)  POCT Blood Glucose.: 355 mg/dL (08 Mar 2019 11:44)  POCT Blood Glucose.: 115 mg/dL (08 Mar 2019 07:50)    MEDICATIONS  (STANDING):  ALBUTerol/ipratropium for Nebulization 3 milliLiter(s) Nebulizer every 4 hours  aspirin enteric coated 81 milliGRAM(s) Oral daily  atorvastatin 40 milliGRAM(s) Oral at bedtime  brimonidine 0.2% Ophthalmic Solution 1 Drop(s) Both EYES two times a day  clopidogrel Tablet 75 milliGRAM(s) Oral daily  dextrose 5%. 1000 milliLiter(s) (50 mL/Hr) IV Continuous <Continuous>  dextrose 50% Injectable 12.5 Gram(s) IV Push once  dextrose 50% Injectable 25 Gram(s) IV Push once  dextrose 50% Injectable 25 Gram(s) IV Push once  dorzolamide 2% Ophthalmic Solution 1 Drop(s) Both EYES three times a day  furosemide   Injectable 20 milliGRAM(s) IV Push daily  heparin  Infusion. 650 Unit(s)/Hr (6.5 mL/Hr) IV Continuous <Continuous>  insulin glargine Injectable (LANTUS) 10 Unit(s) SubCutaneous at bedtime  insulin lispro (HumaLOG) corrective regimen sliding scale   SubCutaneous three times a day before meals  insulin lispro (HumaLOG) corrective regimen sliding scale   SubCutaneous at bedtime  latanoprost 0.005% Ophthalmic Solution 1 Drop(s) Both EYES at bedtime  metoprolol tartrate 50 milliGRAM(s) Oral two times a day  oseltamivir 30 milliGRAM(s) Oral daily  predniSONE   Tablet 20 milliGRAM(s) Oral daily    MEDICATIONS  (PRN):  ALBUTerol    90 MICROgram(s) HFA Inhaler 1 Puff(s) Inhalation every 4 hours PRN Shortness of Breath and/or Wheezing  benzonatate 100 milliGRAM(s) Oral three times a day PRN Cough  dextrose 40% Gel 15 Gram(s) Oral once PRN Blood Glucose LESS THAN 70 milliGRAM(s)/deciliter  glucagon  Injectable 1 milliGRAM(s) IntraMuscular once PRN Glucose LESS THAN 70 milligrams/deciliter  guaiFENesin   Syrup  (Sugar-Free) 100 milliGRAM(s) Oral every 6 hours PRN Cough  heparin  Injectable 4700 Unit(s) IV Push every 6 hours PRN For aPTT less than 40    HOME MEDICATIONS  Ecotrin 325 mg oral delayed release tablet: 1 tab(s) orally once a day (07 Mar 2019 23:19)  glimepiride 4 mg oral tablet: 1 tab(s) orally once a day (07 Mar 2019 23:19)  Januvia 100 mg oral tablet: 1 tab(s) orally once a day (07 Mar 2019 23:19)  Lantus 100 units/mL subcutaneous solution: 68 unit(s) subcutaneous once a day (at bedtime) (07 Mar 2019 23:19)  latanoprost 0.005% ophthalmic solution: 1 drop(s) to each affected eye once a day (at bedtime) (07 Mar 2019 23:19)  Lipitor 40 mg oral tablet: 1 tab(s) orally once a day (07 Mar 2019 23:19)  losartan 50 mg oral tablet: 1 tab(s) orally once a day (07 Mar 2019 23:19)  metFORMIN 1000 mg oral tablet: 1 tab(s) orally 2 times a day (07 Mar 2019 23:19)  Proventil HFA 90 mcg/inh inhalation aerosol: 2 puff(s) inhaled 4 times a day, As Needed (07 Mar 2019 23:19)  Simbrinza 1%- 0.2% ophthalmic suspension: 1 drop(s) to each affected eye 2 times a day (07 Mar 2019 23:19)  Singulair 10 mg oral tablet: 1 tab(s) orally once a day (07 Mar 2019 23:19)  VESIcare 10 mg oral tablet: 1 tab(s) orally once a day (07 Mar 2019 23:19)  Vitamin C 500 mg oral tablet: 1 tab(s) orally once a day (07 Mar 2019 23:19)

## 2019-03-09 NOTE — PROGRESS NOTE ADULT - PROBLEM SELECTOR PLAN 5
2/2 pulmonary edema, asthma exacerbation, and influenza A. Required BIPAP as OSH  - c/w supplemental o2, keep SpO2 >92%, currently doing well on 2LNC  - Management as above for HF and asthma exacerbation 2/2 pulmonary edema, asthma exacerbation, and influenza A. Required BIPAP as OSH  - c/w supplemental o2, keep SpO2 >92%, currently doing well on 1.5L NC  - Management as above for HF and asthma exacerbation

## 2019-03-09 NOTE — PHYSICAL THERAPY INITIAL EVALUATION ADULT - ADDITIONAL COMMENTS
As per the pt she lives in a pvt house w/ spouse, -IAM, Was independent in ADLS/IADLs and functional mobility w/o AD.

## 2019-03-09 NOTE — PROGRESS NOTE ADULT - PROBLEM SELECTOR PLAN 1
P/w NSTEMI, EKG shows old LBBB. Previous cardiac cath showed mild CAD  - Cards following - continue to medically optimize for cardiac catheterization  - s/p ASA/Plavix load, c/w ASA, plavix, heparin gtt, metoprolol tartrate 50 BID, statin  - start ARB after kidney function improves  - Trend bmp, keep K>4, Mg>2  - Continue to monitor on telemetry P/w NSTEMI, EKG shows old LBBB. Previous cardiac cath showed mild CAD  - Cards following - continue to medically optimize for cardiac catheterization  - s/p ASA/Plavix load, c/w ASA, plavix, metoprolol tartrate 50 BID, statin  - Completed >48 hrs of heparin gtt, d/w cards, will dc if repeat CE downtrending  - Will start ARB after kidney function improves  - Trend bmp, keep K>4, Mg>2  - Continue to monitor on telemetry P/w NSTEMI, EKG shows old LBBB. Previous cardiac cath showed mild CAD  - Cards following - continue to medically optimize for cardiac catheterization  - s/p ASA/Plavix load, c/w ASA, plavix, metoprolol tartrate 50 BID, statin  - Completed >48 hrs of heparin gtt, d/w cards, will dc if repeat CE downtrending  - Will start ARB after kidney function improves  - Trend BMP, keep K>4, Mg>2  - Continue to monitor on telemetry

## 2019-03-09 NOTE — PROGRESS NOTE ADULT - PROBLEM SELECTOR PLAN 10
VTE ppx: heparin gtt  Diet: DASH, CC  Dispo: Outpatient PT VTE ppx: heparin gtt --> heparin subq  Diet: DASH, CC  Dispo: Outpatient PT

## 2019-03-09 NOTE — PROGRESS NOTE ADULT - PROBLEM SELECTOR PLAN 4
Worsening- last known baseline 1.01 (9/2013), likely 2/2 low perfusion in setting of NSTEMI/acute heart failure.  - Uptrending today 1.76 -->2.3  - Hold lasix for now   - dc'ed oxybutin as can cause retention  - s/p Bladder scan yesterday 178cc   - Continue to monitor, daily BMP   - strict I/Os, avoid nephrotoxins, renally dose meds Complaining of vaginal itchiness that started yesterday after sitting in urine for a while due to failed female urine condom  - Likely irritant dermatitis from urine   - +erythema and scan white discharge on manual exam but no pain and foul-smelling odor  - d/w patient, will observe for now. If persists, can treat empirically with fluconazole PO 150mg x1 or vaginal cream clomitrazole

## 2019-03-09 NOTE — PROGRESS NOTE ADULT - PROBLEM SELECTOR PLAN 7
A1c 6.8. Home regimen lantus 68u qhs, glimepiride, Januvia, metformin.   - continue with Lantus 10u qhs and changed to moderate ISS

## 2019-03-09 NOTE — PROGRESS NOTE ADULT - ASSESSMENT
73 F with PMHx of HTN, HLD, CAD, DM2 (on Lantus, glimepiride), asthma, glaucoma, uterine cancer s/p RT and MAYNOR-BSO presents as transfer from [facility] for NSTEMI, FLORIDALMA, acute hypoxic respiratory failure 2/2 acute decompensated heart failure, asthma exacerbation, and influenza A, now being medically optimized for [facility] on Monday. 73yoF w/ PMHx significant for HTN, HLD, CAD, DM2 (on Lantus, glimepiride), asthma, glaucoma, uterine cancer s/p RT and MAYNOR-BSO presents as transfer from [facility] for NSTEMI, FLORIDALMA, acute hypoxic respiratory failure 2/2 acute decompensated heart failure, asthma exacerbation, and influenza A, now being medically optimized for [facility] on Monday.

## 2019-03-09 NOTE — PHYSICAL THERAPY INITIAL EVALUATION ADULT - STRENGTHENING, PT EVAL
pt will demonstrate good strength in BLEs/BUEs to improve limb stability during functional mobility in 2weeks

## 2019-03-10 ENCOUNTER — TRANSCRIPTION ENCOUNTER (OUTPATIENT)
Age: 74
End: 2019-03-10

## 2019-03-10 LAB
ANION GAP SERPL CALC-SCNC: 15 MMOL/L — SIGNIFICANT CHANGE UP (ref 5–17)
BUN SERPL-MCNC: 84 MG/DL — HIGH (ref 7–23)
CALCIUM SERPL-MCNC: 9.2 MG/DL — SIGNIFICANT CHANGE UP (ref 8.4–10.5)
CHLORIDE SERPL-SCNC: 99 MMOL/L — SIGNIFICANT CHANGE UP (ref 96–108)
CO2 SERPL-SCNC: 25 MMOL/L — SIGNIFICANT CHANGE UP (ref 22–31)
CREAT SERPL-MCNC: 1.87 MG/DL — HIGH (ref 0.5–1.3)
GLUCOSE BLDC GLUCOMTR-MCNC: 165 MG/DL — HIGH (ref 70–99)
GLUCOSE BLDC GLUCOMTR-MCNC: 228 MG/DL — HIGH (ref 70–99)
GLUCOSE BLDC GLUCOMTR-MCNC: 305 MG/DL — HIGH (ref 70–99)
GLUCOSE BLDC GLUCOMTR-MCNC: 93 MG/DL — SIGNIFICANT CHANGE UP (ref 70–99)
GLUCOSE SERPL-MCNC: 95 MG/DL — SIGNIFICANT CHANGE UP (ref 70–99)
HCT VFR BLD CALC: 40.3 % — SIGNIFICANT CHANGE UP (ref 34.5–45)
HGB BLD-MCNC: 12.3 G/DL — SIGNIFICANT CHANGE UP (ref 11.5–15.5)
MAGNESIUM SERPL-MCNC: 2.9 MG/DL — HIGH (ref 1.6–2.6)
MCHC RBC-ENTMCNC: 28.7 PG — SIGNIFICANT CHANGE UP (ref 27–34)
MCHC RBC-ENTMCNC: 30.5 GM/DL — LOW (ref 32–36)
MCV RBC AUTO: 94.2 FL — SIGNIFICANT CHANGE UP (ref 80–100)
PHOSPHATE SERPL-MCNC: 4.1 MG/DL — SIGNIFICANT CHANGE UP (ref 2.5–4.5)
PLATELET # BLD AUTO: 285 K/UL — SIGNIFICANT CHANGE UP (ref 150–400)
POTASSIUM SERPL-MCNC: 3.7 MMOL/L — SIGNIFICANT CHANGE UP (ref 3.5–5.3)
POTASSIUM SERPL-SCNC: 3.7 MMOL/L — SIGNIFICANT CHANGE UP (ref 3.5–5.3)
RBC # BLD: 4.28 M/UL — SIGNIFICANT CHANGE UP (ref 3.8–5.2)
RBC # FLD: 13.4 % — SIGNIFICANT CHANGE UP (ref 10.3–14.5)
SODIUM SERPL-SCNC: 139 MMOL/L — SIGNIFICANT CHANGE UP (ref 135–145)
WBC # BLD: 11.63 K/UL — HIGH (ref 3.8–10.5)
WBC # FLD AUTO: 11.63 K/UL — HIGH (ref 3.8–10.5)

## 2019-03-10 PROCEDURE — 99233 SBSQ HOSP IP/OBS HIGH 50: CPT | Mod: GC

## 2019-03-10 PROCEDURE — 99232 SBSQ HOSP IP/OBS MODERATE 35: CPT

## 2019-03-10 RX ORDER — POTASSIUM CHLORIDE 20 MEQ
20 PACKET (EA) ORAL ONCE
Qty: 0 | Refills: 0 | Status: COMPLETED | OUTPATIENT
Start: 2019-03-10 | End: 2019-03-10

## 2019-03-10 RX ORDER — METOPROLOL TARTRATE 50 MG
100 TABLET ORAL DAILY
Qty: 0 | Refills: 0 | Status: DISCONTINUED | OUTPATIENT
Start: 2019-03-11 | End: 2019-03-19

## 2019-03-10 RX ADMIN — Medication 20 MILLIGRAM(S): at 06:17

## 2019-03-10 RX ADMIN — Medication 3 MILLILITER(S): at 21:25

## 2019-03-10 RX ADMIN — LATANOPROST 1 DROP(S): 0.05 SOLUTION/ DROPS OPHTHALMIC; TOPICAL at 21:24

## 2019-03-10 RX ADMIN — Medication 100 MILLIGRAM(S): at 21:24

## 2019-03-10 RX ADMIN — HEPARIN SODIUM 5000 UNIT(S): 5000 INJECTION INTRAVENOUS; SUBCUTANEOUS at 06:17

## 2019-03-10 RX ADMIN — INSULIN GLARGINE 10 UNIT(S): 100 INJECTION, SOLUTION SUBCUTANEOUS at 21:25

## 2019-03-10 RX ADMIN — BRIMONIDINE TARTRATE 1 DROP(S): 2 SOLUTION/ DROPS OPHTHALMIC at 17:40

## 2019-03-10 RX ADMIN — Medication 8: at 11:38

## 2019-03-10 RX ADMIN — BRIMONIDINE TARTRATE 1 DROP(S): 2 SOLUTION/ DROPS OPHTHALMIC at 06:16

## 2019-03-10 RX ADMIN — Medication 100 MILLIGRAM(S): at 21:25

## 2019-03-10 RX ADMIN — Medication 4: at 17:37

## 2019-03-10 RX ADMIN — HEPARIN SODIUM 5000 UNIT(S): 5000 INJECTION INTRAVENOUS; SUBCUTANEOUS at 17:40

## 2019-03-10 RX ADMIN — NYSTATIN CREAM 1 APPLICATION(S): 100000 CREAM TOPICAL at 17:36

## 2019-03-10 RX ADMIN — DORZOLAMIDE HYDROCHLORIDE 1 DROP(S): 20 SOLUTION/ DROPS OPHTHALMIC at 06:17

## 2019-03-10 RX ADMIN — ATORVASTATIN CALCIUM 40 MILLIGRAM(S): 80 TABLET, FILM COATED ORAL at 21:24

## 2019-03-10 RX ADMIN — NYSTATIN CREAM 1 APPLICATION(S): 100000 CREAM TOPICAL at 06:18

## 2019-03-10 RX ADMIN — Medication 100 MILLIGRAM(S): at 11:38

## 2019-03-10 RX ADMIN — Medication 3 MILLILITER(S): at 17:36

## 2019-03-10 RX ADMIN — Medication 3 MILLILITER(S): at 11:37

## 2019-03-10 RX ADMIN — Medication 30 MILLIGRAM(S): at 06:17

## 2019-03-10 RX ADMIN — Medication 50 MILLIGRAM(S): at 06:17

## 2019-03-10 RX ADMIN — Medication 81 MILLIGRAM(S): at 11:39

## 2019-03-10 RX ADMIN — Medication 50 MILLIGRAM(S): at 17:40

## 2019-03-10 RX ADMIN — Medication 3 MILLILITER(S): at 06:17

## 2019-03-10 RX ADMIN — CLOPIDOGREL BISULFATE 75 MILLIGRAM(S): 75 TABLET, FILM COATED ORAL at 11:39

## 2019-03-10 RX ADMIN — Medication 100 MILLIGRAM(S): at 06:24

## 2019-03-10 RX ADMIN — Medication 20 MILLIEQUIVALENT(S): at 11:41

## 2019-03-10 NOTE — PROGRESS NOTE ADULT - PROBLEM SELECTOR PLAN 8
2/2 influenza A infection  - supplemental oxygen as needed to maintain sat >92%  - c/w 5-day prednisone to 20mg daily, EOT 3/10 (started at OSH)  - Duoneb q4h

## 2019-03-10 NOTE — DISCHARGE NOTE PROVIDER - PROVIDER TOKENS
PROVIDER:[TOKEN:[[id number]],FOLLOWUP:[2 weeks]],PROVIDER:[TOKEN:[[id number]],FOLLOWUP:[2 weeks]] PROVIDER:[TOKEN:[[id number]],FOLLOWUP:[1 week]],PROVIDER:[[id number]],FOLLOWUP:[1 week]]

## 2019-03-10 NOTE — PROGRESS NOTE ADULT - PROBLEM SELECTOR PLAN 3
- Worsening- last known baseline 1.01 (9/2013), likely 2/2 low perfusion in setting of NSTEMI/acute heart failure.  - Urine lytes with Fe Urea of 30% consistent with prerenal disease, discontinued lasix last night with improvement in creatinine    - Continue to monitor, daily BMP   - strict I/Os, avoid nephrotoxins, renally dose meds  - continue to hold home oxybutin as can cause retention

## 2019-03-10 NOTE — PROGRESS NOTE ADULT - ASSESSMENT
A/P: 73 year old F pt with PMH of HTN, HLD, CAD, DM2, and uterine cancer s/p MAYNOR-BSO presented as a transfer from [place] for NSTEMI in the setting of influenza A.  Found to have severe segmental LV dysfunction at OSH.  Currently feeling better from a FLU standpoint (off isolation).  Euvolemic, chest pain free.     RECS  - cont dapt, atorvastatin 40 mg daily  - no need for further IV diuretics   - switch lopressor to Toprol  mg daily; starting tomorrow   - will plan for C once renal function improves     A Gonzales PUGA   House cardiology

## 2019-03-10 NOTE — PROGRESS NOTE ADULT - SUBJECTIVE AND OBJECTIVE BOX
Patient is a 73y old  Female who presents with a chief complaint of NSTEMI, acute hypoxic respiratory failure, acute decompensated heart failure, FLORIDALMA, influenza A (09 Mar 2019 07:29)    SUBJECTIVE / OVERNIGHT EVENTS:  No acute events overnight. Reports that breathing is better. States that they could not get accurate voids because every time she coughs, she urinates a little. Otherwise denies any chest pain, pressure or palpitations.     MEDICATIONS  (STANDING):  ALBUTerol/ipratropium for Nebulization 3 milliLiter(s) Nebulizer every 4 hours  aspirin enteric coated 81 milliGRAM(s) Oral daily  atorvastatin 40 milliGRAM(s) Oral at bedtime  brimonidine 0.2% Ophthalmic Solution 1 Drop(s) Both EYES two times a day  clopidogrel Tablet 75 milliGRAM(s) Oral daily  dextrose 5%. 1000 milliLiter(s) (50 mL/Hr) IV Continuous <Continuous>  dextrose 50% Injectable 12.5 Gram(s) IV Push once  dextrose 50% Injectable 25 Gram(s) IV Push once  dextrose 50% Injectable 25 Gram(s) IV Push once  dorzolamide 2% Ophthalmic Solution 1 Drop(s) Both EYES three times a day  heparin  Injectable 5000 Unit(s) SubCutaneous every 12 hours  influenza   Vaccine 0.5 milliLiter(s) IntraMuscular once  insulin glargine Injectable (LANTUS) 10 Unit(s) SubCutaneous at bedtime  insulin lispro (HumaLOG) corrective regimen sliding scale   SubCutaneous three times a day before meals  insulin lispro (HumaLOG) corrective regimen sliding scale   SubCutaneous at bedtime  latanoprost 0.005% Ophthalmic Solution 1 Drop(s) Both EYES at bedtime  metoprolol tartrate 50 milliGRAM(s) Oral two times a day  nystatin Powder 1 Application(s) Topical two times a day  potassium chloride   Powder 20 milliEquivalent(s) Oral once    MEDICATIONS  (PRN):  ALBUTerol    90 MICROgram(s) HFA Inhaler 1 Puff(s) Inhalation every 4 hours PRN Shortness of Breath and/or Wheezing  benzonatate 100 milliGRAM(s) Oral three times a day PRN Cough  dextrose 40% Gel 15 Gram(s) Oral once PRN Blood Glucose LESS THAN 70 milliGRAM(s)/deciliter  glucagon  Injectable 1 milliGRAM(s) IntraMuscular once PRN Glucose LESS THAN 70 milligrams/deciliter  guaiFENesin   Syrup  (Sugar-Free) 100 milliGRAM(s) Oral every 6 hours PRN Cough        CAPILLARY BLOOD GLUCOSE      POCT Blood Glucose.: 93 mg/dL (10 Mar 2019 07:28)  POCT Blood Glucose.: 198 mg/dL (09 Mar 2019 21:16)  POCT Blood Glucose.: 142 mg/dL (09 Mar 2019 17:02)  POCT Blood Glucose.: 361 mg/dL (09 Mar 2019 11:47)    I&O's Summary    09 Mar 2019 06:  -  10 Mar 2019 07:00  --------------------------------------------------------  IN: 600 mL / OUT: 300 mL / NET: 300 mL    10 Mar 2019 07:01  -  10 Mar 2019 09:48  --------------------------------------------------------  IN: 0 mL / OUT: 200 mL / NET: -200 mL    PHYSICAL EXAM  GENERAL: NAD, well-developed, found sitting comfortably in bed  HEAD:  Atraumatic, Normocephalic  EYES: EOMI, PERRLA, conjunctiva and sclera clear  NECK: Supple, No JVD  CHEST/LUNG: CTA b/l able to speak in full sentences on RA; No wheeze, rales or rhonchi   HEART: Regular rate and rhythm; No murmur  ABDOMEN: Soft, Nontender, Nondistended; Bowel sounds present  EXTREMITIES:  No LE edema   PSYCH: AAOx3  NEUROLOGY: non-focal  SKIN: No rashes or lesions    LABS:                        14.0   11.42 )-----------( 321      ( 09 Mar 2019 10:41 )             44.2     03-10    139  |  99  |  84<H>  ----------------------------<  95  3.7   |  25  |  1.87<H>    Ca    9.2      10 Mar 2019 06:43  Phos  4.1     03-10  Mg     2.9     03-10      PTT - ( 09 Mar 2019 10:30 )  PTT:44.7 sec  CARDIAC MARKERS ( 09 Mar 2019 11:45 )  x     / x     / 422 U/L / x     / 3.2 ng/mL      Urinalysis Basic - ( 09 Mar 2019 17:00 )    Color: Light Yellow / Appearance: Clear / S.023 / pH: x  Gluc: x / Ketone: Negative  / Bili: Negative / Urobili: Negative   Blood: x / Protein: Trace / Nitrite: Negative   Leuk Esterase: Negative / RBC: 1 /hpf / WBC 1 /HPF   Sq Epi: x / Non Sq Epi: 4 /hpf / Bacteria: Negative      RADIOLOGY & ADDITIONAL TESTS:    Imaging Personally Reviewed:  Consultant(s) Notes Reviewed:    Care Discussed with Consultants/Other Providers:

## 2019-03-10 NOTE — DISCHARGE NOTE PROVIDER - HOSPITAL COURSE
History of Present Illness:     72yo F with PMHx of HTN, HLD, CAD (3 cardiac catheterizations in the past, the last being 2008, which showed mild), DM2 (on Lantus, glimepiride), asthma, glaucoma, uterine cancer s/p RT and MAYNOR-BSO presents as transfer from [facility] for NSTEMI, FLORIDALMA, acute hypoxic respiratory failure 2/2 acute decompensated heart failure, asthma exacerbation from influenza A.         Of note, at OSH she was found to have NSTEMI with trop-I that peaked at 6.02, EKG showed old LBBB, acute decompensated heart failure requiring BIPAP with pBNP 5803, and influenza A positive. She was monitored in the ICU. She was treated with ASA/plavix load, heparin drip. ARB held for FLORIDALMA, diuresed with lasix IV. TTE showed LVEF 25-30%, wall motion abnormalities, elevated filling pressures, mild MR, mod-severe AS, normal RV function, mild TR. FS down to 48 today while pt was NPO after receiving lantus 30u the night prior. Transferred to [facility] for cardiac cath.        HOSPITAL COURSE    She was transferred further management and was medically optimized prior to LHC with diuresis   . Course was c/b FLORIDALMA and vulvovaginitis. UA was negative for infection, FLORIDALMA was likely prerenal per urine studies; her home oxybutynin and current diuresis with lasix were held. Wulvovaginitis was treated empirically with antifungal tx. She completed 5-day tx of Tamiflu (3/6-3/10), 5-day course of steroids for asthma (3/6 to 3/10) and hypoxia improved with supportive care. She is scheduled for LHC on 3/11 if medically optimized. History of Present Illness:     72yo F with PMHx of HTN, HLD, CAD (3 cardiac catheterizations in the past, the last being 2008, which showed mild), DM2 (on Lantus, glimepiride), asthma, glaucoma, uterine cancer s/p RT and MAYNOR-BSO presents as transfer from [facility] for NSTEMI, FLORIDALMA, acute hypoxic respiratory failure 2/2 acute decompensated heart failure, asthma exacerbation from influenza A.         Of note, at OSH she was found to have NSTEMI with trop-I that peaked at 6.02, EKG showed old LBBB, acute decompensated heart failure requiring BIPAP with pBNP 5803, and influenza A positive. She was monitored in the ICU. She was treated with ASA/plavix load, heparin drip. ARB held for FLORIDALMA, diuresed with lasix IV. TTE showed LVEF 25-30%, wall motion abnormalities, elevated filling pressures, mild MR, mod-severe AS, normal RV function, mild TR. FS down to 48 today while pt was NPO after receiving lantus 30u the night prior. Transferred to [facility] for cardiac cath.        HOSPITAL COURSE    She was transferred further management and was medically optimized with diuresis  prior to LHC. Course was c/b FLORIDALMA and vulvovaginitis. UA was negative for infection, FLORIDALMA was likely prerenal per urine studies; her home oxybutynin and current diuresis with lasix were held. Vulvovaginitis was treated nystatin powder. She completed 5-day tx of Tamiflu (3/6-3/10), 5-day course of steroids for asthma (3/6 to 3/10) and hypoxia improved with supportive care. She is scheduled for LHC on 3/11 if medically optimized. History of Present Illness:     72yo F with PMHx of HTN, HLD, CAD (3 cardiac catheterizations in the past, the last being 2008, which showed mild), DM2 (on Lantus, glimepiride), asthma, glaucoma, uterine cancer s/p RT and MAYNOR-BSO presents as transfer from [facility] for NSTEMI, FLORIDALMA, acute hypoxic respiratory failure 2/2 acute decompensated heart failure, asthma exacerbation from influenza A.         Of note, at OSH she was found to have NSTEMI with trop-I that peaked at 6.02, EKG showed old LBBB, acute decompensated heart failure requiring BIPAP with pBNP 5803, and influenza A positive. She was monitored in the ICU. She was treated with ASA/plavix load, heparin drip. ARB held for FLORIDALMA, diuresed with lasix IV. TTE showed LVEF 25-30%, wall motion abnormalities, elevated filling pressures, mild MR, mod-severe AS, normal RV function, mild TR. FS down to 48 today while pt was NPO after receiving lantus 30u the night prior. Transferred to [facility] for cardiac cath.        HOSPITAL COURSE    She was transferred further management and was medically optimized with diuresis  prior to [facility]. Course was c/b FLORIDALMA and vulvovaginitis. UA was negative for infection, FLORIDALMA was likely prerenal per urine studies; her home oxybutynin and current diuresis with lasix were held. Vulvovaginitis was treated nystatin powder. She completed 5-day tx of Tamiflu (3/6-3/10), 5-day course of steroids for asthma (3/6 to 3/10) and hypoxia improved with supportive care. She had a cath on 3/12 that showed an 80% mLAD and 70% lcx, but no intervention was initially performed due to LVEDP of 40. she was started on IV BID Lasix with improved pressures. On 3/15 she had a stent to the left circumflex (80%) on 3/15. On 3/18 she had a staged PCI to the mLAD (70%). She was d/cd on PO Lasix and told to hold off her home Losartan due to mildly rising creatinine until she follows up outpatient. Discharge creatinine was 1.69. Patient was euvolemic and medically stable on d/c. History of Present Illness:     72yo F with PMHx of HTN, HLD, CAD (3 cardiac catheterizations in the past, the last being 2008, which showed mild), DM2 (on Lantus, glimepiride), asthma, glaucoma, uterine cancer s/p RT and MAYNRO-BSO presents as transfer from [facility] for NSTEMI, FLORIDALMA, acute hypoxic respiratory failure 2/2 acute decompensated heart failure, asthma exacerbation from influenza A.         Of note, at OSH she was found to have NSTEMI with trop-I that peaked at 6.02, EKG showed old LBBB, acute decompensated heart failure requiring BIPAP with pBNP 5803, and influenza A positive. She was monitored in the ICU. She was treated with ASA/plavix load, heparin drip. ARB held for FLORIDALMA, diuresed with lasix IV. TTE showed LVEF 25-30%, wall motion abnormalities, elevated filling pressures, mild MR, mod-severe AS, normal RV function, mild TR. FS down to 48 today while pt was NPO after receiving lantus 30u the night prior. Transferred to [facility] for cardiac cath.        HOSPITAL COURSE    She was transferred further management and was medically optimized with diuresis  prior to [facility]. Course was c/b FLORIDALMA and vulvovaginitis. UA was negative for infection, FLORIDALMA was likely prerenal per urine studies; her home oxybutynin and current diuresis with lasix were held. Vulvovaginitis was treated nystatin powder. She completed 5-day tx of Tamiflu (3/6-3/10), 5-day course of steroids for asthma (3/6 to 3/10) and hypoxia improved with supportive care. She had a cath on 3/12 that showed an 80% mLAD and 70% lcx, but no intervention was initially performed due to LVEDP of 40. she was started on IV BID Lasix with improved pressures. On 3/15 she had a stent to the left circumflex (80%) on 3/15. On 3/18 she had a staged PCI  w/t 2 TONY to the mLAD (70%). She was d/cd on PO Lasix and told to hold off her home Losartan due to mildly rising creatinine until she follows up outpatient. Discharge creatinine was 1.69. Patient was euvolemic and medically stable on d/c.

## 2019-03-10 NOTE — PROGRESS NOTE ADULT - PROBLEM SELECTOR PLAN 5
- RESOLVED, likely initially in the setting of ADHF 2/2 flu A +   - 2/2 pulmonary edema, asthma exacerbation, and influenza A.   - currently off of any O2 and satting well, holding further lasix doses at this time given FLORIDALMA  - Management as above for HF and asthma exacerbation

## 2019-03-10 NOTE — PROGRESS NOTE ADULT - PROBLEM SELECTOR PLAN 1
- transferred from OSH with NSTEMI, EKG shows old LBBB. Previous cardiac cath showed mild CAD  - Cards following - continue to medically optimize for cardiac catheterization, with tentative plan for [facility] tomorrow. CKMB trending down from OSH to now, patient currently chest pain free   - s/p ASA/Plavix load, c/w ASA, plavix, metoprolol tartrate 50 BID, statin  - As per discussion with cardiology, given downtrend in cardiac enzymes now off of hep gtt   - Will start ARB after kidney function improves -- creatinine downtrending however still not at baseline   - Trend BMP, keep K>4, Mg>2  - Continue to monitor on telemetry - transferred from OSH with NSTEMI, EKG shows old LBBB. Previous cardiac cath showed mild CAD  - Cards following - continue to medically optimize for cardiac catheterization, with tentative plan for [facility] tomorrow vs tues pending creatinine. CKMB trending down from OSH to now, patient currently chest pain free   - s/p ASA/Plavix load, c/w ASA, plavix, metoprolol tartrate 50 BID, statin  - As per discussion with cardiology, given downtrend in cardiac enzymes now off of hep gtt   - Will start ARB after kidney function improves -- creatinine downtrending however still not at baseline   - Trend BMP, keep K>4, Mg>2  - Continue to monitor on telemetry

## 2019-03-10 NOTE — PROGRESS NOTE ADULT - ATTENDING COMMENTS
Patient seen and examined. Agree with assessment and plan as outlined above. Currently without cardiac complaints. Plan for cath when renal function stable.

## 2019-03-10 NOTE — DISCHARGE NOTE PROVIDER - CARE PROVIDERS DIRECT ADDRESSES
[email or web address]

## 2019-03-10 NOTE — PROGRESS NOTE ADULT - ASSESSMENT
73yoF w/ PMHx significant for HTN, HLD, CAD, DM2 (on Lantus, glimepiride), asthma, glaucoma, uterine cancer s/p RT and MAYNOR-BSO presents as transfer from [facility] for NSTEMI, FLORIDALMA, acute hypoxic respiratory failure 2/2 acute decompensated heart failure, asthma exacerbation, and influenza A, now being medically optimized for [facility] on Monday.

## 2019-03-10 NOTE — PROGRESS NOTE ADULT - PROBLEM SELECTOR PLAN 10
VTE ppx: HSQ  Diet: DASH, CC  Dispo: Patient improved from respiratory standpoint, now off of O2 and doing well. chest pain free. Cardiac enzymes downtrended last night, tentative LHC tomorrow as per cards last note. Continue with daily weights as patient could not tolerate matos. Daily weights - - can restart ARB tomorrow if LHC gets pushed off otherwise, continue to hold until after procedure     Basil Gongora  PGY2  [phone]

## 2019-03-10 NOTE — PROGRESS NOTE ADULT - PROBLEM SELECTOR PLAN 4
Complaining of vaginal itchiness that started yesterday after sitting in urine for a while due to failed female urine condom -- started on nystatin powder overnight   - Likely irritant dermatitis from urine   - +erythema and scan white discharge on manual exam but no pain and foul-smelling odor

## 2019-03-10 NOTE — PROGRESS NOTE ADULT - ATTENDING COMMENTS
no chest pain, sob, LE edema, on RA    NSTEMI: plan for cardiac cath monday or tuesday pending creatinine per cards, c/w DAPT, statin, betablocker,  tele    FLORIDALMA likely prerenal given flu and diuresis: improving, creatinine 1.8, hold off on further diuresis, hold ARB on resolving FLORIDALMA, monitor bmp,     Acute systolic HF: EF 25-30% on TTE, euvolemic, hold lasix and arb, switch to toprol 100mg in AM     FLU: completed tamiflu, afebrile

## 2019-03-10 NOTE — PROGRESS NOTE ADULT - PROBLEM SELECTOR PLAN 2
2/2 NSTEMI, mod-severe AS, and asthma exacerbation, required BIPAP at OSH, now on RA   - Unreliable I/O due to failed female urine condom, however weight unchanged  - daily weights standing at the very least given unreliable I/Os  - Ischemic eval per cardiology -- as per last note [facility] likely monday

## 2019-03-10 NOTE — PROGRESS NOTE ADULT - SUBJECTIVE AND OBJECTIVE BOX
doing well this morning   no chest pain/pressure on exertion this morning   No other new/acute complaints         Medications:  ALBUTerol    90 MICROgram(s) HFA Inhaler 1 Puff(s) Inhalation every 4 hours PRN  ALBUTerol/ipratropium for Nebulization 3 milliLiter(s) Nebulizer every 4 hours  aspirin enteric coated 81 milliGRAM(s) Oral daily  atorvastatin 40 milliGRAM(s) Oral at bedtime  benzonatate 100 milliGRAM(s) Oral three times a day PRN  brimonidine 0.2% Ophthalmic Solution 1 Drop(s) Both EYES two times a day  clopidogrel Tablet 75 milliGRAM(s) Oral daily  dextrose 40% Gel 15 Gram(s) Oral once PRN  dextrose 5%. 1000 milliLiter(s) IV Continuous <Continuous>  dextrose 50% Injectable 12.5 Gram(s) IV Push once  dextrose 50% Injectable 25 Gram(s) IV Push once  dextrose 50% Injectable 25 Gram(s) IV Push once  dorzolamide 2% Ophthalmic Solution 1 Drop(s) Both EYES three times a day  glucagon  Injectable 1 milliGRAM(s) IntraMuscular once PRN  guaiFENesin   Syrup  (Sugar-Free) 100 milliGRAM(s) Oral every 6 hours PRN  heparin  Injectable 5000 Unit(s) SubCutaneous every 12 hours  influenza   Vaccine 0.5 milliLiter(s) IntraMuscular once  insulin glargine Injectable (LANTUS) 10 Unit(s) SubCutaneous at bedtime  insulin lispro (HumaLOG) corrective regimen sliding scale   SubCutaneous three times a day before meals  insulin lispro (HumaLOG) corrective regimen sliding scale   SubCutaneous at bedtime  latanoprost 0.005% Ophthalmic Solution 1 Drop(s) Both EYES at bedtime  metoprolol tartrate 50 milliGRAM(s) Oral two times a day  nystatin Powder 1 Application(s) Topical two times a day      PAST MEDICAL & SURGICAL HISTORY:  Aortic stenosis: mod-severe  Coronary artery disease  Asthma  Glaucoma  Uterine cancer: s/p RT and MAYNOR-BSO  Diabetes mellitus  Hypertension  Hyperlipidemia  S/P MAYNOR-BSO (total abdominal hysterectomy and bilateral salpingo-oophorectomy)        Vitals:  T(F): 98.9 (03-10), Max: 98.9 (03-10)  HR: 84 (03-10) (75 - 85)  BP: 123/63 (03-10) (117/61 - 148/76)  RR: 18 (03-10)  SpO2: 92% (03-10)  I&O's Summary    09 Mar 2019 06:01  -  10 Mar 2019 07:00  --------------------------------------------------------  IN: 600 mL / OUT: 300 mL / NET: 300 mL    10 Mar 2019 07:01  -  10 Mar 2019 14:04  --------------------------------------------------------  IN: 0 mL / OUT: 200 mL / NET: -200 mL        Physical Exam:  Appearance: No acute distress; well appearing  Eyes: PERRL, EOMI, pink conjunctiva  HENT: Normal oral mucosa  Cardiovascular: RRR, S1, S2, no murmurs, rubs, or gallops; no edema; no JVD  Respiratory: Clear to auscultation bilaterally  Gastrointestinal: soft, non-tender, non-distended with normal bowel sounds  Musculoskeletal: No clubbing; no joint deformity   Neurologic: Non-focal  Lymphatic: No lymphadenopathy  Psychiatry: AAOx3, mood & affect appropriate  Skin: No rashes, ecchymoses, or cyanosis                          12.3   11.63 )-----------( 285      ( 10 Mar 2019 10:43 )             40.3     03-10    139  |  99  |  84<H>  ----------------------------<  95  3.7   |  25  |  1.87<H>    Ca    9.2      10 Mar 2019 06:43  Phos  4.1     03-10  Mg     2.9     03-10      PTT - ( 09 Mar 2019 10:30 )  PTT:44.7 sec  CARDIAC MARKERS ( 09 Mar 2019 11:45 )  x     / x     / 422 U/L / x     / 3.2 ng/mL      Serum Pro-Brain Natriuretic Peptide: 5803 pg/mL (03-06 @ 10:54)          New ECG(s): Personally reviewed    Echo:    < from: TTE Echo Doppler w/o Cont (03.06.19 @ 15:10) >    1. Decreased systolic function with a visually estimated left ventricular   ejection fraction of 25-30%.  2. Multiple regional wall motion abnormalities as described above.  3. LV diastolic dysfunction with evidence of elevated filling pressures.  4. Moderate posterior mitral annular calcification. Mild mitral valve   regurgitation.  5. Calcified trileaflet aortic valve. Moderate to severe aortic stenosis   and mild aortic valve insufficiency.  6. the right ventricle is grossly normal in size and systolic function.  7. Normal tricuspid valve with mild tricuspid valve regurgitation with   incomplete waveform.  8. Normal pulmonic valve with trace insufficiency.  9. Limited study to estimate pulmonary artery systolic pressure          < end of copied text >    Interpretation of Telemetry:  sinus rhythm, HR 60s-70s

## 2019-03-10 NOTE — DISCHARGE NOTE PROVIDER - NSDCCPCAREPLAN_GEN_ALL_CORE_FT
PRINCIPAL DISCHARGE DIAGNOSIS  Problem: NSTEMI (non-ST elevated myocardial infarction)  Assessment and Plan of Treatment: You were transferred to [facility] for management of a heart attack. You had a cardiac catheterization that showed...   Please follow up with your cardiologist Dr. Christie within 1-2 weeks of discharge.      SECONDARY DISCHARGE DIAGNOSES  Problem: Acute systolic heart failure  Assessment and Plan of Treatment: Please continue to take your water pill, statin, aspirin and plavex as prescribed above. Please follow up with your cardiologist Dr. Christie within 1-2 weeks of discharge.    Problem: Influenza A  Assessment and Plan of Treatment: You completed your course of antiviral with Tamiflu and improved with supportive care.    Problem: FLORIDALMA (acute kidney injury)  Assessment and Plan of Treatment: You were found to have an acute kidney injury and improved with the appropriate treatment.  Please follow up with your primary care provider Dr. Bonilla within 2 weeks of discharge.    Problem: Asthma exacerbation  Assessment and Plan of Treatment: You were treated with the appriopriate medications including steroids and improved. Please follow up with your primary care provider Dr. Bonilla within 2 weeks of discharge.    Problem: Diabetes mellitus  Assessment and Plan of Treatment: You had hypoglycemia requiring glucose that has now resolved. Your sugar levels are better controlled now. Please check your blood sugars at home. Please continue to take your medications at home and follow up with your primary care doctor in 1-2 weeks. PRINCIPAL DISCHARGE DIAGNOSIS  Diagnosis: NSTEMI (non-ST elevated myocardial infarction)  Assessment and Plan of Treatment: You were transferred to [facility] for management of a heart attack. You had a cardiac catheterization that showed narrowing of 2 of your major heart blood vessels. First, you had to be given lasix to remove fluid so that stents could be placed in the narrowed vessels. After the IV lasix treatment you went back to the cath lab to have 1 stent placed in your left circumflex artery and then again a second time for 2 stents to be placed in your left anterior descending artery. Please continue to take 81mg of aspirin daily, 75mg of plavix daily, and 40mg of atorvastatin every evening. Please follow up with your cardiologist Dr. Christie within 1 weeks of discharge and have a repeat basic metabolic panel to check your kidney function.      SECONDARY DISCHARGE DIAGNOSES  Diagnosis: Diabetes mellitus  Assessment and Plan of Treatment: You had hypoglycemia requiring glucose that has now resolved. Your sugar levels are better controlled now. Please check your blood sugars at home. Please continue to take your medications at home and follow up with your primary care doctor, Dr. Bonilla, in 1-2 weeks.      Diagnosis: Asthma exacerbation  Assessment and Plan of Treatment: You were treated with the appriopriate medications including steroids and improved. Please follow up with your primary care provider Dr. Bonilla within 2 weeks of discharge.    Diagnosis: FLORIDALMA (acute kidney injury)  Assessment and Plan of Treatment: You were found to have an acute kidney injury and improved with the appropriate treatment, however, you then went to the cath lab twice more and now have a mild kidney injury. Please follow up with your cardiologist or primary care provider Dr. Bonilla within 1 week to check your kidney function. Please do not take your losartan until after you follow up with your cardiologist or primary care about your kidney function.    Diagnosis: Influenza A  Assessment and Plan of Treatment: You completed your course of antiviral with Tamiflu and improved with supportive care.    Diagnosis: Acute systolic heart failure  Assessment and Plan of Treatment: Please continue to take 40mg of lasix daily, 100mg of metoprolol succinate daily, 40mg of atorvastatin every evening, 81mg of aspirin daily, and 75mg of plavix daily. Please follow up with your cardiologist Dr. Christie within 1 week of discharge. PRINCIPAL DISCHARGE DIAGNOSIS  Diagnosis: NSTEMI (non-ST elevated myocardial infarction)  Assessment and Plan of Treatment: You were transferred to [facility] for management of a heart attack. You had a cardiac catheterization that showed narrowing of 2 of your major heart blood vessels. First, you had to be given lasix to remove fluid so that stents could be placed in the narrowed vessels. After the IV lasix treatment you went back to the cath lab to have 1 stent placed in your left circumflex artery and then again a second time for 2 stents to be placed in your left anterior descending artery. Please continue to take 81mg of aspirin daily, 75mg of plavix daily, and 40mg of atorvastatin every evening. Please follow up with your cardiologist Dr. Christie within 1 weeks of discharge and have a repeat basic metabolic panel to check your kidney function.      SECONDARY DISCHARGE DIAGNOSES  Diagnosis: Diabetes mellitus  Assessment and Plan of Treatment: You had hypoglycemia requiring glucose that has now resolved. Your sugar levels are better controlled now. Please check your blood sugars at home. Please continue to take your medications at home and follow up with your primary care doctor, Dr. Bonilla, in 1-2 weeks.      Diagnosis: Asthma exacerbation  Assessment and Plan of Treatment: You were treated with the appriopriate medications including steroids and improved. Please follow up with your primary care provider Dr. Bonilla within 2 weeks of discharge.    Diagnosis: FLORIDALMA (acute kidney injury)  Assessment and Plan of Treatment: You were found to have an acute kidney injury and improved with the appropriate treatment, however, you then went to the cath lab twice more and now have a mild kidney injury. Please follow up with your cardiologist or primary care provider Dr. Bonilla within 1 week to check your kidney function.    Diagnosis: Influenza A  Assessment and Plan of Treatment: You completed your course of antiviral with Tamiflu and improved with supportive care.    Diagnosis: Acute systolic heart failure  Assessment and Plan of Treatment: Please continue to take 40mg of lasix daily, 100mg of metoprolol succinate daily, 40mg of atorvastatin every evening, 81mg of aspirin daily, and 75mg of plavix daily. Please follow up with your cardiologist Dr. Christie within 1 week of discharge.

## 2019-03-10 NOTE — DISCHARGE NOTE PROVIDER - CARE PROVIDER_API CALL
Yuan Christie (MD)  Cardiovascular Disease; Interventional Cardiology  [address] [place] [address]  Phone: [phone]  Fax: [phone]  Follow Up Time: 2 weeks    Jeovany Bonilla)  Medicine  [address]  [place] [address]  Phone: [phone]  Fax: [phone]  Follow Up Time: 2 weeks Yuan Christie (MD)  Cardiovascular Disease; Interventional Cardiology  [address] [place] [address]  Phone: [phone]  Fax: [phone]  Follow Up Time: 1 week    Jeovany Bonilla)  Medicine  [address]  [place] [address]  Phone: [phone]  Fax: [phone]  Follow Up Time: 1 week

## 2019-03-11 LAB
ANION GAP SERPL CALC-SCNC: 12 MMOL/L — SIGNIFICANT CHANGE UP (ref 5–17)
APTT BLD: 32 SEC — SIGNIFICANT CHANGE UP (ref 27.5–36.3)
BUN SERPL-MCNC: 70 MG/DL — HIGH (ref 7–23)
CALCIUM SERPL-MCNC: 9.5 MG/DL — SIGNIFICANT CHANGE UP (ref 8.4–10.5)
CHLORIDE SERPL-SCNC: 103 MMOL/L — SIGNIFICANT CHANGE UP (ref 96–108)
CO2 SERPL-SCNC: 25 MMOL/L — SIGNIFICANT CHANGE UP (ref 22–31)
CREAT SERPL-MCNC: 1.45 MG/DL — HIGH (ref 0.5–1.3)
CULTURE RESULTS: SIGNIFICANT CHANGE UP
CULTURE RESULTS: SIGNIFICANT CHANGE UP
GLUCOSE BLDC GLUCOMTR-MCNC: 117 MG/DL — HIGH (ref 70–99)
GLUCOSE SERPL-MCNC: 115 MG/DL — HIGH (ref 70–99)
HCT VFR BLD CALC: 40.7 % — SIGNIFICANT CHANGE UP (ref 34.5–45)
HGB BLD-MCNC: 12.9 G/DL — SIGNIFICANT CHANGE UP (ref 11.5–15.5)
INR BLD: 0.99 RATIO — SIGNIFICANT CHANGE UP (ref 0.88–1.16)
MAGNESIUM SERPL-MCNC: 3 MG/DL — HIGH (ref 1.6–2.6)
MCHC RBC-ENTMCNC: 29.3 PG — SIGNIFICANT CHANGE UP (ref 27–34)
MCHC RBC-ENTMCNC: 31.7 GM/DL — LOW (ref 32–36)
MCV RBC AUTO: 92.3 FL — SIGNIFICANT CHANGE UP (ref 80–100)
PHOSPHATE SERPL-MCNC: 3.6 MG/DL — SIGNIFICANT CHANGE UP (ref 2.5–4.5)
PLATELET # BLD AUTO: 309 K/UL — SIGNIFICANT CHANGE UP (ref 150–400)
POTASSIUM SERPL-MCNC: 4.3 MMOL/L — SIGNIFICANT CHANGE UP (ref 3.5–5.3)
POTASSIUM SERPL-SCNC: 4.3 MMOL/L — SIGNIFICANT CHANGE UP (ref 3.5–5.3)
PROTHROM AB SERPL-ACNC: 11.3 SEC — SIGNIFICANT CHANGE UP (ref 10–13.1)
RBC # BLD: 4.41 M/UL — SIGNIFICANT CHANGE UP (ref 3.8–5.2)
RBC # FLD: 13.1 % — SIGNIFICANT CHANGE UP (ref 10.3–14.5)
SODIUM SERPL-SCNC: 140 MMOL/L — SIGNIFICANT CHANGE UP (ref 135–145)
SPECIMEN SOURCE: SIGNIFICANT CHANGE UP
SPECIMEN SOURCE: SIGNIFICANT CHANGE UP
WBC # BLD: 11.67 K/UL — HIGH (ref 3.8–10.5)
WBC # FLD AUTO: 11.67 K/UL — HIGH (ref 3.8–10.5)

## 2019-03-11 PROCEDURE — 99233 SBSQ HOSP IP/OBS HIGH 50: CPT | Mod: GC

## 2019-03-11 RX ORDER — FLUTICASONE PROPIONATE 50 MCG
1 SPRAY, SUSPENSION NASAL
Qty: 0 | Refills: 0 | Status: DISCONTINUED | OUTPATIENT
Start: 2019-03-11 | End: 2019-03-19

## 2019-03-11 RX ADMIN — ATORVASTATIN CALCIUM 40 MILLIGRAM(S): 80 TABLET, FILM COATED ORAL at 21:24

## 2019-03-11 RX ADMIN — DORZOLAMIDE HYDROCHLORIDE 1 DROP(S): 20 SOLUTION/ DROPS OPHTHALMIC at 21:23

## 2019-03-11 RX ADMIN — Medication 100 MILLIGRAM(S): at 12:10

## 2019-03-11 RX ADMIN — Medication 3 MILLILITER(S): at 18:01

## 2019-03-11 RX ADMIN — INSULIN GLARGINE 10 UNIT(S): 100 INJECTION, SOLUTION SUBCUTANEOUS at 22:28

## 2019-03-11 RX ADMIN — NYSTATIN CREAM 1 APPLICATION(S): 100000 CREAM TOPICAL at 18:02

## 2019-03-11 RX ADMIN — CLOPIDOGREL BISULFATE 75 MILLIGRAM(S): 75 TABLET, FILM COATED ORAL at 12:10

## 2019-03-11 RX ADMIN — Medication 100 MILLIGRAM(S): at 06:01

## 2019-03-11 RX ADMIN — Medication 3 MILLILITER(S): at 10:08

## 2019-03-11 RX ADMIN — BRIMONIDINE TARTRATE 1 DROP(S): 2 SOLUTION/ DROPS OPHTHALMIC at 06:00

## 2019-03-11 RX ADMIN — Medication 81 MILLIGRAM(S): at 12:10

## 2019-03-11 RX ADMIN — HEPARIN SODIUM 5000 UNIT(S): 5000 INJECTION INTRAVENOUS; SUBCUTANEOUS at 18:01

## 2019-03-11 RX ADMIN — NYSTATIN CREAM 1 APPLICATION(S): 100000 CREAM TOPICAL at 06:00

## 2019-03-11 RX ADMIN — DORZOLAMIDE HYDROCHLORIDE 1 DROP(S): 20 SOLUTION/ DROPS OPHTHALMIC at 14:16

## 2019-03-11 RX ADMIN — BRIMONIDINE TARTRATE 1 DROP(S): 2 SOLUTION/ DROPS OPHTHALMIC at 18:02

## 2019-03-11 RX ADMIN — LATANOPROST 1 DROP(S): 0.05 SOLUTION/ DROPS OPHTHALMIC; TOPICAL at 21:23

## 2019-03-11 RX ADMIN — Medication 1 SPRAY(S): at 18:02

## 2019-03-11 RX ADMIN — Medication 3 MILLILITER(S): at 14:17

## 2019-03-11 RX ADMIN — Medication 2: at 18:02

## 2019-03-11 RX ADMIN — Medication 3 MILLILITER(S): at 21:22

## 2019-03-11 RX ADMIN — Medication 3 MILLILITER(S): at 06:00

## 2019-03-11 RX ADMIN — Medication 2: at 12:09

## 2019-03-11 RX ADMIN — Medication 100 MILLIGRAM(S): at 18:04

## 2019-03-11 RX ADMIN — Medication 100 MILLIGRAM(S): at 18:03

## 2019-03-11 RX ADMIN — HEPARIN SODIUM 5000 UNIT(S): 5000 INJECTION INTRAVENOUS; SUBCUTANEOUS at 06:00

## 2019-03-11 NOTE — PROGRESS NOTE ADULT - PROBLEM SELECTOR PLAN 1
- transferred from OSH with NSTEMI, EKG shows old LBBB. Previous cardiac cath showed mild CAD  - Cards following - continue to medically optimize for cardiac catheterization, with tentative plan for [facility] tomorrow vs tues pending creatinine. CKMB trending down from OSH to now, patient currently chest pain free   - s/p ASA/Plavix load, c/w ASA, plavix, metoprolol succinate 100 QD, statin  - As per discussion with cardiology, given downtrend in cardiac enzymes now off of hep gtt   - Will start ARB after kidney function improves -- creatinine downtrending however still not at baseline   - Trend BMP, keep K>4, Mg>2  - Continue to monitor on telemetry - transferred from OSH with NSTEMI, EKG shows old LBBB. Previous cardiac cath showed mild CAD  - Cards following - continue to medically optimize for cardiac catheterization, with tentative plan for C tomorrow vs tues pending creatinine. CKMB trending down from OSH to now, patient currently chest pain free   - c/w ASA, plavix,   - c/w metoprolol succinate 100 QD,   - c/w statin  - Hold ARB 2/2 FLORIDALMA and plan for cath  - Trend BMP, keep K>4, Mg>2  - Continue to monitor on telemetry - transferred from OSH with NSTEMI, EKG shows old LBBB. Previous cardiac cath showed mild CAD  - Cards following - continue to medically optimize for cardiac catheterization, with tentative plan for C tomorrow vs tues pending creatinine. CKMB trending down from OSH to now, patient currently chest pain free   - c/w ASA, plavix,   - c/w metoprolol succinate 100 daily  - c/w statin  - Hold ARB 2/2 FLORIDALMA and plan for cath  - Trend BMP, keep K>4, Mg>2  - Continue to monitor on telemetry

## 2019-03-11 NOTE — PROGRESS NOTE ADULT - PROBLEM SELECTOR PLAN 3
- Worsening- last known baseline 1.01 (9/2013), likely 2/2 low perfusion in setting of NSTEMI/acute heart failure.  - Urine lytes with Fe Urea of 30% consistent with prerenal disease, discontinued lasix last night with improvement in creatinine    - Continue to monitor, daily BMP   - strict I/Os, avoid nephrotoxins, renally dose meds  - continue to hold home oxybutin as can cause retention Baseline 1.06-1.21 per PCP office - IMPROVING  - Urine lytes with Fe Urea of 30% consistent with prerenal disease, discontinued lasix last night with improvement in creatinine    - Continue to monitor, daily BMP   - strict I/Os, avoid nephrotoxins, renally dose meds  - continue to hold home oxybutin as can cause retention

## 2019-03-11 NOTE — PROGRESS NOTE ADULT - PROBLEM SELECTOR PLAN 7
A1c 6.8. Home regimen lantus 68u qhs, glimepiride, Januvia, metformin.   - continue with Lantus 10u qhs and changed to moderate ISS A1c 6.8.   - continue with Lantus 10u QHS  - moderate ISS  - FSG w/t meals and QHS

## 2019-03-11 NOTE — PROGRESS NOTE ADULT - PROBLEM SELECTOR PLAN 8
2/2 influenza A infection  - supplemental oxygen as needed to maintain sat >92%  - c/w 5-day prednisone to 20mg daily, EOT 3/10 (started at OSH)  - Duoneb q4h 2/2 influenza A infection  - supplemental oxygen as needed to maintain sat >92%  - c/w 5-day prednisone to 20mg daily, EOT 3/10 (started at OSH)  - Duoneb Q6H PRN wheeze

## 2019-03-11 NOTE — PROGRESS NOTE ADULT - PROBLEM SELECTOR PLAN 10
VTE ppx: HSQ  Diet: DASH, CC  Dispo: Patient improved from respiratory standpoint, now off of O2 and doing well. chest pain free. Cardiac enzymes downtrended last night, tentative LHC tomorrow as per cards last note. Continue with daily weights as patient could not tolerate matos. Daily weights - - can restart ARB tomorrow if LHC gets pushed off otherwise, continue to hold until after procedure     Ren Fierro MD/MS  PGY-1 Internal Medicine  [facility]  Pager# [phone] ([facility])/[phone] ([facility])

## 2019-03-11 NOTE — PROGRESS NOTE ADULT - SUBJECTIVE AND OBJECTIVE BOX
Dr. Ren Fierro   Internal Medicine PGY-1  Pager #[phone]    Patient is a 73y old  Female who presents with a chief complaint of NSTEMI, acute hypoxic respiratory failure, acute decompensated heart failure, FLORIDALMA, influenza A (10 Mar 2019 14:03)      SUBJECTIVE / OVERNIGHT EVENTS:    MEDICATIONS  (STANDING):  ALBUTerol/ipratropium for Nebulization 3 milliLiter(s) Nebulizer every 4 hours  aspirin enteric coated 81 milliGRAM(s) Oral daily  atorvastatin 40 milliGRAM(s) Oral at bedtime  brimonidine 0.2% Ophthalmic Solution 1 Drop(s) Both EYES two times a day  clopidogrel Tablet 75 milliGRAM(s) Oral daily  dextrose 5%. 1000 milliLiter(s) (50 mL/Hr) IV Continuous <Continuous>  dextrose 50% Injectable 12.5 Gram(s) IV Push once  dextrose 50% Injectable 25 Gram(s) IV Push once  dextrose 50% Injectable 25 Gram(s) IV Push once  dorzolamide 2% Ophthalmic Solution 1 Drop(s) Both EYES three times a day  heparin  Injectable 5000 Unit(s) SubCutaneous every 12 hours  influenza   Vaccine 0.5 milliLiter(s) IntraMuscular once  insulin glargine Injectable (LANTUS) 10 Unit(s) SubCutaneous at bedtime  insulin lispro (HumaLOG) corrective regimen sliding scale   SubCutaneous three times a day before meals  insulin lispro (HumaLOG) corrective regimen sliding scale   SubCutaneous at bedtime  latanoprost 0.005% Ophthalmic Solution 1 Drop(s) Both EYES at bedtime  metoprolol succinate  milliGRAM(s) Oral daily  nystatin Powder 1 Application(s) Topical two times a day    MEDICATIONS  (PRN):  ALBUTerol    90 MICROgram(s) HFA Inhaler 1 Puff(s) Inhalation every 4 hours PRN Shortness of Breath and/or Wheezing  benzonatate 100 milliGRAM(s) Oral three times a day PRN Cough  dextrose 40% Gel 15 Gram(s) Oral once PRN Blood Glucose LESS THAN 70 milliGRAM(s)/deciliter  glucagon  Injectable 1 milliGRAM(s) IntraMuscular once PRN Glucose LESS THAN 70 milligrams/deciliter  guaiFENesin   Syrup  (Sugar-Free) 100 milliGRAM(s) Oral every 6 hours PRN Cough      Vital Signs Last 24 Hrs  T(C): 36.8 (11 Mar 2019 06:10), Max: 37.2 (10 Mar 2019 13:38)  T(F): 98.3 (11 Mar 2019 06:10), Max: 98.9 (10 Mar 2019 13:38)  HR: 83 (11 Mar 2019 06:10) (79 - 87)  BP: 150/76 (11 Mar 2019 06:10) (120/59 - 150/76)  BP(mean): --  RR: 18 (11 Mar 2019 06:10) (18 - 18)  SpO2: 97% (11 Mar 2019 06:10) (92% - 97%)  CAPILLARY BLOOD GLUCOSE      POCT Blood Glucose.: 165 mg/dL (10 Mar 2019 21:17)  POCT Blood Glucose.: 228 mg/dL (10 Mar 2019 17:21)  POCT Blood Glucose.: 305 mg/dL (10 Mar 2019 11:26)  POCT Blood Glucose.: 93 mg/dL (10 Mar 2019 07:28)    I&O's Summary    09 Mar 2019 06:01  -  10 Mar 2019 07:00  --------------------------------------------------------  IN: 600 mL / OUT: 300 mL / NET: 300 mL    10 Mar 2019 07:01  -  11 Mar 2019 06:22  --------------------------------------------------------  IN: 900 mL / OUT: 600 mL / NET: 300 mL        PHYSICAL EXAM  GENERAL: NAD, well-developed, found sitting comfortably in bed  HEAD:  Atraumatic, Normocephalic  EYES: EOMI, PERRLA, conjunctiva and sclera clear  NECK: Supple, No JVD  CHEST/LUNG: CTA b/l able to speak in full sentences on RA; No wheeze, rales or rhonchi   HEART: Regular rate and rhythm; No murmur  ABDOMEN: Soft, Nontender, Nondistended; Bowel sounds present  EXTREMITIES:  No LE edema   PSYCH: AAOx3  NEUROLOGY: non-focal  SKIN: No rashes or lesions    LABS:                        12.3   11.63 )-----------( 285      ( 10 Mar 2019 10:43 )             40.3     03-10    139  |  99  |  84<H>  ----------------------------<  95  3.7   |  25  |  1.87<H>    Ca    9.2      10 Mar 2019 06:43  Phos  4.1     03-10  Mg     2.9     -10      PTT - ( 09 Mar 2019 10:30 )  PTT:44.7 sec  CARDIAC MARKERS ( 09 Mar 2019 11:45 )  x     / x     / 422 U/L / x     / 3.2 ng/mL      Urinalysis Basic - ( 09 Mar 2019 17:00 )    Color: Light Yellow / Appearance: Clear / S.023 / pH: x  Gluc: x / Ketone: Negative  / Bili: Negative / Urobili: Negative   Blood: x / Protein: Trace / Nitrite: Negative   Leuk Esterase: Negative / RBC: 1 /hpf / WBC 1 /HPF   Sq Epi: x / Non Sq Epi: 4 /hpf / Bacteria: Negative        RADIOLOGY & ADDITIONAL TESTS:    Imaging Personally Reviewed:    Consultant(s) Notes Reviewed:      Care Discussed with Consultants/Other Providers: Dr. Ren Fierro   Internal Medicine PGY-1  Pager #[phone]    Patient is a 73y old  Female who presents with a chief complaint of NSTEMI, acute hypoxic respiratory failure, acute decompensated heart failure, FLORIDALMA, influenza A (10 Mar 2019 14:03)      SUBJECTIVE / OVERNIGHT EVENTS:  DIAN ON. Vaginal and stomach itch improving. No CP, SOB, wheeze. No fevers, sweats, or chills. No abdominal pain. Feels some chest congestion, but no cough.     MEDICATIONS  (STANDING):  ALBUTerol/ipratropium for Nebulization 3 milliLiter(s) Nebulizer every 4 hours  aspirin enteric coated 81 milliGRAM(s) Oral daily  atorvastatin 40 milliGRAM(s) Oral at bedtime  brimonidine 0.2% Ophthalmic Solution 1 Drop(s) Both EYES two times a day  clopidogrel Tablet 75 milliGRAM(s) Oral daily  dextrose 5%. 1000 milliLiter(s) (50 mL/Hr) IV Continuous <Continuous>  dextrose 50% Injectable 12.5 Gram(s) IV Push once  dextrose 50% Injectable 25 Gram(s) IV Push once  dextrose 50% Injectable 25 Gram(s) IV Push once  dorzolamide 2% Ophthalmic Solution 1 Drop(s) Both EYES three times a day  heparin  Injectable 5000 Unit(s) SubCutaneous every 12 hours  influenza   Vaccine 0.5 milliLiter(s) IntraMuscular once  insulin glargine Injectable (LANTUS) 10 Unit(s) SubCutaneous at bedtime  insulin lispro (HumaLOG) corrective regimen sliding scale   SubCutaneous three times a day before meals  insulin lispro (HumaLOG) corrective regimen sliding scale   SubCutaneous at bedtime  latanoprost 0.005% Ophthalmic Solution 1 Drop(s) Both EYES at bedtime  metoprolol succinate  milliGRAM(s) Oral daily  nystatin Powder 1 Application(s) Topical two times a day    MEDICATIONS  (PRN):  ALBUTerol    90 MICROgram(s) HFA Inhaler 1 Puff(s) Inhalation every 4 hours PRN Shortness of Breath and/or Wheezing  benzonatate 100 milliGRAM(s) Oral three times a day PRN Cough  dextrose 40% Gel 15 Gram(s) Oral once PRN Blood Glucose LESS THAN 70 milliGRAM(s)/deciliter  glucagon  Injectable 1 milliGRAM(s) IntraMuscular once PRN Glucose LESS THAN 70 milligrams/deciliter  guaiFENesin   Syrup  (Sugar-Free) 100 milliGRAM(s) Oral every 6 hours PRN Cough      Vital Signs Last 24 Hrs  T(C): 36.8 (11 Mar 2019 06:10), Max: 37.2 (10 Mar 2019 13:38)  T(F): 98.3 (11 Mar 2019 06:10), Max: 98.9 (10 Mar 2019 13:38)  HR: 83 (11 Mar 2019 06:10) (79 - 87)  BP: 150/76 (11 Mar 2019 06:10) (120/59 - 150/76)  BP(mean): --  RR: 18 (11 Mar 2019 06:10) (18 - 18)  SpO2: 97% (11 Mar 2019 06:10) (92% - 97%)  CAPILLARY BLOOD GLUCOSE      POCT Blood Glucose.: 165 mg/dL (10 Mar 2019 21:17)  POCT Blood Glucose.: 228 mg/dL (10 Mar 2019 17:21)  POCT Blood Glucose.: 305 mg/dL (10 Mar 2019 11:26)  POCT Blood Glucose.: 93 mg/dL (10 Mar 2019 07:28)    I&O's Summary    09 Mar 2019 06:01  -  10 Mar 2019 07:00  --------------------------------------------------------  IN: 600 mL / OUT: 300 mL / NET: 300 mL    10 Mar 2019 07:01  -  11 Mar 2019 06:22  --------------------------------------------------------  IN: 900 mL / OUT: 600 mL / NET: 300 mL        PHYSICAL EXAM  GENERAL: NAD, well-developed, found sitting comfortably in bed  HEAD:  Atraumatic, Normocephalic  EYES: EOMI, PERRLA, conjunctiva and sclera clear  NECK: Supple, No JVD  CHEST/LUNG: Expiratory wheeze   HEART: Regular rate and rhythm; No murmur  ABDOMEN: Soft, Nontender, Nondistended; Bowel sounds present  EXTREMITIES:  No LE edema   PSYCH: AAOx3  NEUROLOGY: non-focal  SKIN: No rashes or lesions    LABS:                        12.3   11.63 )-----------( 285      ( 10 Mar 2019 10:43 )             40.3     03-10    139  |  99  |  84<H>  ----------------------------<  95  3.7   |  25  |  1.87<H>    Ca    9.2      10 Mar 2019 06:43  Phos  4.1     -10  Mg     2.9     -10      PTT - ( 09 Mar 2019 10:30 )  PTT:44.7 sec  CARDIAC MARKERS ( 09 Mar 2019 11:45 )  x     / x     / 422 U/L / x     / 3.2 ng/mL      Urinalysis Basic - ( 09 Mar 2019 17:00 )    Color: Light Yellow / Appearance: Clear / S.023 / pH: x  Gluc: x / Ketone: Negative  / Bili: Negative / Urobili: Negative   Blood: x / Protein: Trace / Nitrite: Negative   Leuk Esterase: Negative / RBC: 1 /hpf / WBC 1 /HPF   Sq Epi: x / Non Sq Epi: 4 /hpf / Bacteria: Negative        RADIOLOGY & ADDITIONAL TESTS:    Imaging Personally Reviewed:    Consultant(s) Notes Reviewed:      Care Discussed with Consultants/Other Providers: Dr. Ren Fierro   Internal Medicine PGY-1  Pager #[phone]    Patient is a 73y old  Female who presents with a chief complaint of NSTEMI, acute hypoxic respiratory failure, acute decompensated heart failure, FLORIDALMA, influenza A (10 Mar 2019 14:03)      SUBJECTIVE / OVERNIGHT EVENTS:  DIAN ON. Vaginal and stomach itch improving. No CP, SOB, wheeze. No fevers, sweats, or chills. No abdominal pain. Feels some chest congestion, but no cough.     MEDICATIONS  (STANDING):  ALBUTerol/ipratropium for Nebulization 3 milliLiter(s) Nebulizer every 4 hours  aspirin enteric coated 81 milliGRAM(s) Oral daily  atorvastatin 40 milliGRAM(s) Oral at bedtime  brimonidine 0.2% Ophthalmic Solution 1 Drop(s) Both EYES two times a day  clopidogrel Tablet 75 milliGRAM(s) Oral daily  dextrose 5%. 1000 milliLiter(s) (50 mL/Hr) IV Continuous <Continuous>  dextrose 50% Injectable 12.5 Gram(s) IV Push once  dextrose 50% Injectable 25 Gram(s) IV Push once  dextrose 50% Injectable 25 Gram(s) IV Push once  dorzolamide 2% Ophthalmic Solution 1 Drop(s) Both EYES three times a day  heparin  Injectable 5000 Unit(s) SubCutaneous every 12 hours  influenza   Vaccine 0.5 milliLiter(s) IntraMuscular once  insulin glargine Injectable (LANTUS) 10 Unit(s) SubCutaneous at bedtime  insulin lispro (HumaLOG) corrective regimen sliding scale   SubCutaneous three times a day before meals  insulin lispro (HumaLOG) corrective regimen sliding scale   SubCutaneous at bedtime  latanoprost 0.005% Ophthalmic Solution 1 Drop(s) Both EYES at bedtime  metoprolol succinate  milliGRAM(s) Oral daily  nystatin Powder 1 Application(s) Topical two times a day    MEDICATIONS  (PRN):  ALBUTerol    90 MICROgram(s) HFA Inhaler 1 Puff(s) Inhalation every 4 hours PRN Shortness of Breath and/or Wheezing  benzonatate 100 milliGRAM(s) Oral three times a day PRN Cough  dextrose 40% Gel 15 Gram(s) Oral once PRN Blood Glucose LESS THAN 70 milliGRAM(s)/deciliter  glucagon  Injectable 1 milliGRAM(s) IntraMuscular once PRN Glucose LESS THAN 70 milligrams/deciliter  guaiFENesin   Syrup  (Sugar-Free) 100 milliGRAM(s) Oral every 6 hours PRN Cough      Vital Signs Last 24 Hrs  T(C): 36.8 (11 Mar 2019 06:10), Max: 37.2 (10 Mar 2019 13:38)  T(F): 98.3 (11 Mar 2019 06:10), Max: 98.9 (10 Mar 2019 13:38)  HR: 83 (11 Mar 2019 06:10) (79 - 87)  BP: 150/76 (11 Mar 2019 06:10) (120/59 - 150/76)  BP(mean): --  RR: 18 (11 Mar 2019 06:10) (18 - 18)  SpO2: 97% (11 Mar 2019 06:10) (92% - 97%)  CAPILLARY BLOOD GLUCOSE      POCT Blood Glucose.: 165 mg/dL (10 Mar 2019 21:17)  POCT Blood Glucose.: 228 mg/dL (10 Mar 2019 17:21)  POCT Blood Glucose.: 305 mg/dL (10 Mar 2019 11:26)  POCT Blood Glucose.: 93 mg/dL (10 Mar 2019 07:28)    I&O's Summary    09 Mar 2019 06:01  -  10 Mar 2019 07:00  --------------------------------------------------------  IN: 600 mL / OUT: 300 mL / NET: 300 mL    10 Mar 2019 07:01  -  11 Mar 2019 06:22  --------------------------------------------------------  IN: 900 mL / OUT: 600 mL / NET: 300 mL        PHYSICAL EXAM  GENERAL: NAD, well-developed, found sitting comfortably in bed  HEAD:  Atraumatic, Normocephalic  EYES: EOMI, PERRLA, conjunctiva and sclera clear  NECK: Supple, No JVD  CHEST/LUNG: Expiratory wheeze   HEART: Regular rate and rhythm; No murmur  ABDOMEN: Soft, Nontender, Nondistended; Bowel sounds present  EXTREMITIES:  No LE edema   PSYCH: AAOx3  NEUROLOGY: non-focal  SKIN: Erythema under stomach folds of skin above the pelvis. Improved erythema of the vulva.     LABS:                        12.3   11.63 )-----------( 285      ( 10 Mar 2019 10:43 )             40.3     03-10    139  |  99  |  84<H>  ----------------------------<  95  3.7   |  25  |  1.87<H>    Ca    9.2      10 Mar 2019 06:43  Phos  4.1     03-10  Mg     2.9     03-10      PTT - ( 09 Mar 2019 10:30 )  PTT:44.7 sec  CARDIAC MARKERS ( 09 Mar 2019 11:45 )  x     / x     / 422 U/L / x     / 3.2 ng/mL      Urinalysis Basic - ( 09 Mar 2019 17:00 )    Color: Light Yellow / Appearance: Clear / S.023 / pH: x  Gluc: x / Ketone: Negative  / Bili: Negative / Urobili: Negative   Blood: x / Protein: Trace / Nitrite: Negative   Leuk Esterase: Negative / RBC: 1 /hpf / WBC 1 /HPF   Sq Epi: x / Non Sq Epi: 4 /hpf / Bacteria: Negative        RADIOLOGY & ADDITIONAL TESTS:    Imaging Personally Reviewed:    Consultant(s) Notes Reviewed:      Care Discussed with Consultants/Other Providers:

## 2019-03-11 NOTE — PROGRESS NOTE ADULT - PROBLEM SELECTOR PLAN 4
Complaining of vaginal itchiness that started yesterday after sitting in urine for a while due to failed female urine condom -- started on nystatin powder overnight   - Likely irritant dermatitis from urine   - +erythema and scan white discharge on manual exam but no pain and foul-smelling odor Vaginal erythema and pruritis likely 2/2 Vulvovaginitis  - c/w nystatin powder overnight   - Likely irritant dermatitis from urine

## 2019-03-11 NOTE — PROGRESS NOTE ADULT - ATTENDING COMMENTS
73 year old woman with severe LV dysfunction, presented with respiratory distress, chest heaviness and flu positive. Troponin elevated, EKG LBBB. Transferred for coronary angiography, deferred pending resolution of FLORIDALMA. Creatinine declining, likely ready for procedure tomorrow.

## 2019-03-11 NOTE — PROGRESS NOTE ADULT - PROBLEM SELECTOR PLAN 5
- RESOLVED, likely initially in the setting of ADHF 2/2 flu A +   - 2/2 pulmonary edema, asthma exacerbation, and influenza A.   - currently off of any O2 and satting well, holding further lasix doses at this time given FLORIDALMA  - Management as above for HF and asthma exacerbation 2/2 ADHF 2/2 flu A +  -With mild wheeze today  - 2/2 pulmonary edema, asthma exacerbation, and influenza A.   - currently off of any O2 and satting well, holding further lasix doses at this time given FLORIDALMA  - Management as above for HF and asthma exacerbation  - Duonebs Q6H PRN wheeze 2/2 ADHF 2/2 flu A +  - 2/2 pulmonary edema, asthma exacerbation, and influenza A.   - currently off of any O2 and satting well, holding further lasix doses at this time given FLORIDALMA  - Management as above for HF and asthma exacerbation  - Duonebs Q6H PRN wheeze

## 2019-03-11 NOTE — PROGRESS NOTE ADULT - PROBLEM SELECTOR PLAN 2
2/2 NSTEMI, mod-severe AS, and asthma exacerbation, required BIPAP at OSH, now on RA   - Unreliable I/O due to failed female urine condom, however weight unchanged  - daily weights standing at the very least given unreliable I/Os  - Ischemic eval per cardiology -- as per last note [facility] likely monday 2/2 NSTEMI, mod-severe AS, and asthma exacerbation, required BIPAP at OSH, now on RA   - Unreliable I/O due to failed female urine condom, so daily standing weights  - Ischemic eval per cardiology -- as per last note [facility] likely monday

## 2019-03-11 NOTE — PROGRESS NOTE ADULT - ASSESSMENT
73yoF w/ PMHx significant for HTN, HLD, CAD, DM2 (on Lantus, glimepiride), asthma, glaucoma, uterine cancer s/p RT and MAYNOR-BSO presents as transfer from [facility] for NSTEMI, FLORIDALMA, acute hypoxic respiratory failure 2/2 acute decompensated heart failure, asthma exacerbation, and influenza A, now being medically optimized for [facility] on Monday. 73yoF w/ PMHx significant for HTN, HLD, CAD, DM2 (on Lantus, glimepiride), asthma, glaucoma, uterine cancer s/p RT and MAYNOR-BSO presents as transfer from [facility] for NSTEMI, FLORIDALMA, acute hypoxic respiratory failure 2/2 acute decompensated heart failure, asthma exacerbation, and influenza A, now being medically optimized for [facility] pending FLORIDALMA resolution.

## 2019-03-11 NOTE — PROGRESS NOTE ADULT - SUBJECTIVE AND OBJECTIVE BOX
Patient seen and examined at bedside.    Overnight Events: NAEO, feels well.    Review Of Systems: No chest pain, shortness of breath, or palpitations            Current Meds:  ALBUTerol    90 MICROgram(s) HFA Inhaler 1 Puff(s) Inhalation every 4 hours PRN  ALBUTerol/ipratropium for Nebulization 3 milliLiter(s) Nebulizer every 4 hours  aspirin enteric coated 81 milliGRAM(s) Oral daily  atorvastatin 40 milliGRAM(s) Oral at bedtime  benzonatate 100 milliGRAM(s) Oral three times a day PRN  brimonidine 0.2% Ophthalmic Solution 1 Drop(s) Both EYES two times a day  clopidogrel Tablet 75 milliGRAM(s) Oral daily  dextrose 40% Gel 15 Gram(s) Oral once PRN  dextrose 5%. 1000 milliLiter(s) IV Continuous <Continuous>  dextrose 50% Injectable 12.5 Gram(s) IV Push once  dextrose 50% Injectable 25 Gram(s) IV Push once  dextrose 50% Injectable 25 Gram(s) IV Push once  dorzolamide 2% Ophthalmic Solution 1 Drop(s) Both EYES three times a day  glucagon  Injectable 1 milliGRAM(s) IntraMuscular once PRN  guaiFENesin   Syrup  (Sugar-Free) 100 milliGRAM(s) Oral every 6 hours PRN  heparin  Injectable 5000 Unit(s) SubCutaneous every 12 hours  influenza   Vaccine 0.5 milliLiter(s) IntraMuscular once  insulin glargine Injectable (LANTUS) 10 Unit(s) SubCutaneous at bedtime  insulin lispro (HumaLOG) corrective regimen sliding scale   SubCutaneous three times a day before meals  insulin lispro (HumaLOG) corrective regimen sliding scale   SubCutaneous at bedtime  latanoprost 0.005% Ophthalmic Solution 1 Drop(s) Both EYES at bedtime  metoprolol succinate  milliGRAM(s) Oral daily  nystatin Powder 1 Application(s) Topical two times a day      Vitals:  T(F): 98.3 (03-11), Max: 98.9 (03-10)  HR: 83 (03-11) (79 - 87)  BP: 150/76 (03-11) (120/59 - 150/76)  RR: 18 (03-11)  SpO2: 97% (03-11)  I&O's Summary    10 Mar 2019 07:01  -  11 Mar 2019 07:00  --------------------------------------------------------  IN: 900 mL / OUT: 600 mL / NET: 300 mL        Physical Exam:  Appearance: No acute distress; well appearing  HEENT:  EOMI, sclera anicteric, Normal oral mucosa  Cardiovascular: RRR, S1, S2, no murmurs, rubs, or gallops; no edema; no JVD  Respiratory: Clear to auscultation bilaterally, no wheezes, rales, rhonchi  Gastrointestinal: soft, non-tender, non-distended with normal bowel sounds  Musculoskeletal: No clubbing; no joint deformity   Neurologic: Non-focal  Lymphatic: No lymphadenopathy  Psychiatry: AAOx3, mood & affect appropriate  Skin: No rashes, ecchymoses, or cyanosis                          12.3   11.63 )-----------( 285      ( 10 Mar 2019 10:43 )             40.3     03-11    140  |  103  |  70<H>  ----------------------------<  115<H>  4.3   |  25  |  1.45<H>    Ca    9.5      11 Mar 2019 05:56  Phos  3.6     03-11  Mg     3.0     03-11      PTT - ( 09 Mar 2019 10:30 )  PTT:44.7 sec  CARDIAC MARKERS ( 09 Mar 2019 11:45 )  377 ng/L / x     / x     / 422 U/L / x     / 3.2 ng/mL  CARDIAC MARKERS ( 07 Mar 2019 17:07 )  x     / 3.950 ng/mL / x     / 863 U/L / x     / 5.6 ng/mL  CARDIAC MARKERS ( 07 Mar 2019 05:20 )  x     / 6.020 ng/mL / x     / 646 U/L / x     / 8.8 ng/mL  CARDIAC MARKERS ( 06 Mar 2019 19:30 )  x     / 4.030 ng/mL / x     / 406 U/L / x     / 12.2 ng/mL  CARDIAC MARKERS ( 06 Mar 2019 10:54 )  x     / .296 ng/mL / x     / x     / x     / 3.6 ng/mL      Serum Pro-Brain Natriuretic Peptide: 5803 pg/mL (03-06 @ 10:54)      Echo:  3/6/19  1. Decreased systolic function with a visually estimated left ventricular   ejection fraction of 25-30%.  2. Multiple regional wall motion abnormalities as described above.  3. LV diastolic dysfunction with evidence of elevated filling pressures.  4. Moderate posterior mitral annular calcification. Mild mitral valve   regurgitation.  5. Calcified trileaflet aortic valve. Moderate to severe aortic stenosis   and mild aortic valve insufficiency.  6. the right ventricle is grossly normal in size and systolic function.  7. Normal tricuspid valve with mild tricuspid valve regurgitation with   incomplete waveform.  8. Normal pulmonic valve with trace insufficiency.  9. Limited study to estimate pulmonary artery systolic pressure        Interpretation of Telemetry:  sinus rhythm, HR 70s-80s Patient seen and examined at bedside.    Overnight Events: NAEO, feels well.    Review Of Systems:   CONSTITUTIONAL: weakness improved, no diaphoresis and cough improving  EYES/ENT: No visual changes;  No dysphagia  NECK: No pain or stiffness  RESPIRATORY: cough improving, no wheezing, no hemoptysis; No shortness of breath  CARDIOVASCULAR: No chest pain or palpitations; No lower extremity edema  GASTROINTESTINAL: No abdominal or epigastric pain. No nausea, vomiting, or hematemesis; No diarrhea or constipation. No melena or hematochezia.  BACK: No back pain  GENITOURINARY: No dysuria, frequency or hematuria  NEUROLOGICAL: No numbness or weakness  SKIN: No itching, burning, rashes, or lesions            Current Meds:  ALBUTerol    90 MICROgram(s) HFA Inhaler 1 Puff(s) Inhalation every 4 hours PRN  ALBUTerol/ipratropium for Nebulization 3 milliLiter(s) Nebulizer every 4 hours  aspirin enteric coated 81 milliGRAM(s) Oral daily  atorvastatin 40 milliGRAM(s) Oral at bedtime  benzonatate 100 milliGRAM(s) Oral three times a day PRN  brimonidine 0.2% Ophthalmic Solution 1 Drop(s) Both EYES two times a day  clopidogrel Tablet 75 milliGRAM(s) Oral daily  dextrose 40% Gel 15 Gram(s) Oral once PRN  dextrose 5%. 1000 milliLiter(s) IV Continuous <Continuous>  dextrose 50% Injectable 12.5 Gram(s) IV Push once  dextrose 50% Injectable 25 Gram(s) IV Push once  dextrose 50% Injectable 25 Gram(s) IV Push once  dorzolamide 2% Ophthalmic Solution 1 Drop(s) Both EYES three times a day  glucagon  Injectable 1 milliGRAM(s) IntraMuscular once PRN  guaiFENesin   Syrup  (Sugar-Free) 100 milliGRAM(s) Oral every 6 hours PRN  heparin  Injectable 5000 Unit(s) SubCutaneous every 12 hours  influenza   Vaccine 0.5 milliLiter(s) IntraMuscular once  insulin glargine Injectable (LANTUS) 10 Unit(s) SubCutaneous at bedtime  insulin lispro (HumaLOG) corrective regimen sliding scale   SubCutaneous three times a day before meals  insulin lispro (HumaLOG) corrective regimen sliding scale   SubCutaneous at bedtime  latanoprost 0.005% Ophthalmic Solution 1 Drop(s) Both EYES at bedtime  metoprolol succinate  milliGRAM(s) Oral daily  nystatin Powder 1 Application(s) Topical two times a day      Vitals:  T(F): 98.3 (03-11), Max: 98.9 (03-10)  HR: 83 (03-11) (79 - 87)  BP: 150/76 (03-11) (120/59 - 150/76)  RR: 18 (03-11)  SpO2: 97% (03-11)  I&O's Summary    10 Mar 2019 07:01  -  11 Mar 2019 07:00  --------------------------------------------------------  IN: 900 mL / OUT: 600 mL / NET: 300 mL        Physical Exam:  Appearance: No acute distress; well appearing  HEENT:  EOMI, sclera anicteric, Normal oral mucosa  Cardiovascular: RRR, S1, S2, no murmurs, rubs, or gallops; no edema; no JVD  Respiratory: Clear to auscultation bilaterally, no wheezes, rales, rhonchi  Gastrointestinal: soft, non-tender, non-distended with normal bowel sounds  Musculoskeletal: No clubbing; no joint deformity   Neurologic: Non-focal  Lymphatic: No lymphadenopathy  Psychiatry: AAOx3, mood & affect appropriate  Skin: No rashes, ecchymoses, or cyanosis                          12.3   11.63 )-----------( 285      ( 10 Mar 2019 10:43 )             40.3     03-11    140  |  103  |  70<H>  ----------------------------<  115<H>  4.3   |  25  |  1.45<H>    Ca    9.5      11 Mar 2019 05:56  Phos  3.6     03-11  Mg     3.0     03-11      PTT - ( 09 Mar 2019 10:30 )  PTT:44.7 sec  CARDIAC MARKERS ( 09 Mar 2019 11:45 )  377 ng/L / x     / x     / 422 U/L / x     / 3.2 ng/mL  CARDIAC MARKERS ( 07 Mar 2019 17:07 )  x     / 3.950 ng/mL / x     / 863 U/L / x     / 5.6 ng/mL  CARDIAC MARKERS ( 07 Mar 2019 05:20 )  x     / 6.020 ng/mL / x     / 646 U/L / x     / 8.8 ng/mL  CARDIAC MARKERS ( 06 Mar 2019 19:30 )  x     / 4.030 ng/mL / x     / 406 U/L / x     / 12.2 ng/mL  CARDIAC MARKERS ( 06 Mar 2019 10:54 )  x     / .296 ng/mL / x     / x     / x     / 3.6 ng/mL      Serum Pro-Brain Natriuretic Peptide: 5803 pg/mL (03-06 @ 10:54)      Echo:  3/6/19  1. Decreased systolic function with a visually estimated left ventricular   ejection fraction of 25-30%.  2. Multiple regional wall motion abnormalities as described above.  3. LV diastolic dysfunction with evidence of elevated filling pressures.  4. Moderate posterior mitral annular calcification. Mild mitral valve   regurgitation.  5. Calcified trileaflet aortic valve. Moderate to severe aortic stenosis   and mild aortic valve insufficiency.  6. the right ventricle is grossly normal in size and systolic function.  7. Normal tricuspid valve with mild tricuspid valve regurgitation with   incomplete waveform.  8. Normal pulmonic valve with trace insufficiency.  9. Limited study to estimate pulmonary artery systolic pressure        Interpretation of Telemetry:  sinus rhythm, HR 70s-80s

## 2019-03-11 NOTE — PROGRESS NOTE ADULT - ATTENDING COMMENTS
EF 25-30% on TTE currently  euvolemic, hold lasix and arb, on toprol 100mg xl  lizette resolving  plan for [facility] tomorrow  plan to restart arb/lasix after cath

## 2019-03-11 NOTE — PROGRESS NOTE ADULT - PROBLEM SELECTOR PLAN 6
RVP positive   - Tamiflu 5-day course  - cough symptom control RVP positive - RESOLVED  - Tamiflu 5-day course  - cough symptom control

## 2019-03-11 NOTE — PROGRESS NOTE ADULT - ASSESSMENT
A/P: 73 year old F pt with PMH of HTN, HLD, CAD, DM2, and uterine cancer s/p MAYNOR-BSO presented as a transfer from [place] for NSTEMI in the setting of influenza A.  Found to have severe segmental LV dysfunction at OSH.  Currently feeling better from a FLU standpoint (off isolation).  Euvolemic, chest pain free.     RECS  - cont dapt, atorvastatin 40 mg daily, metop succinate 100mg daily  - creatinine continues to improve, now 1.45, will discuss timing of C    Amrit Duckworth, Cardiology fellow  [id number] A/P: 73 year old F pt with PMH of HTN, HLD, CAD, DM2, and uterine cancer s/p MAYNOR-BSO presented as a transfer from [place] for NSTEMI in the setting of influenza A.  Found to have severe segmental LV dysfunction at OSH.  Currently feeling better from a FLU standpoint (off isolation).  Euvolemic, chest pain free.     RECS  - cont dapt, atorvastatin 40 mg daily, metop succinate 100mg daily  - creatinine continues to improve, now 1.45, continue to monitor and will plan for LHC when stable    Amrit Duckworth, Cardiology fellow  [phone]

## 2019-03-11 NOTE — PHARMACOTHERAPY INTERVENTION NOTE - COMMENTS
Counseled patient and spouse on new inpatient medications for NSTEMI - aspirin 81 mg, clopidogrel 75 mg, and metoprolol  mg. Discussed medication doses, indications, and possible side effects and provided antiplatelet and beta blocker medication cards.    Ning Benitez, PharmD   [phone]
Patient started on oseltamivir for influenza treatment at [facility] on 3/6. Now with FLORIDALMA, Scr 1.4-->1.76 today, recommended renally adjust oseltamivir to 30 mg once a day through 3/10/19 for CrCl <30 ml/min.    Ning Benitez, PharmD   [phone]

## 2019-03-12 LAB
ANION GAP SERPL CALC-SCNC: 14 MMOL/L — SIGNIFICANT CHANGE UP (ref 5–17)
APTT BLD: 34.8 SEC — SIGNIFICANT CHANGE UP (ref 27.5–36.3)
BLD GP AB SCN SERPL QL: NEGATIVE — SIGNIFICANT CHANGE UP
BUN SERPL-MCNC: 54 MG/DL — HIGH (ref 7–23)
CALCIUM SERPL-MCNC: 9.4 MG/DL — SIGNIFICANT CHANGE UP (ref 8.4–10.5)
CHLORIDE SERPL-SCNC: 102 MMOL/L — SIGNIFICANT CHANGE UP (ref 96–108)
CO2 SERPL-SCNC: 25 MMOL/L — SIGNIFICANT CHANGE UP (ref 22–31)
CREAT SERPL-MCNC: 1.29 MG/DL — SIGNIFICANT CHANGE UP (ref 0.5–1.3)
GLUCOSE SERPL-MCNC: 152 MG/DL — HIGH (ref 70–99)
HCT VFR BLD CALC: 40.6 % — SIGNIFICANT CHANGE UP (ref 34.5–45)
HGB BLD-MCNC: 12.7 G/DL — SIGNIFICANT CHANGE UP (ref 11.5–15.5)
INR BLD: 1.01 RATIO — SIGNIFICANT CHANGE UP (ref 0.88–1.16)
MAGNESIUM SERPL-MCNC: 2.9 MG/DL — HIGH (ref 1.6–2.6)
MCHC RBC-ENTMCNC: 29.1 PG — SIGNIFICANT CHANGE UP (ref 27–34)
MCHC RBC-ENTMCNC: 31.3 GM/DL — LOW (ref 32–36)
MCV RBC AUTO: 92.9 FL — SIGNIFICANT CHANGE UP (ref 80–100)
PHOSPHATE SERPL-MCNC: 3.8 MG/DL — SIGNIFICANT CHANGE UP (ref 2.5–4.5)
PLATELET # BLD AUTO: 322 K/UL — SIGNIFICANT CHANGE UP (ref 150–400)
POTASSIUM SERPL-MCNC: 4.8 MMOL/L — SIGNIFICANT CHANGE UP (ref 3.5–5.3)
POTASSIUM SERPL-SCNC: 4.8 MMOL/L — SIGNIFICANT CHANGE UP (ref 3.5–5.3)
PROTHROM AB SERPL-ACNC: 11.6 SEC — SIGNIFICANT CHANGE UP (ref 10–13.1)
RBC # BLD: 4.37 M/UL — SIGNIFICANT CHANGE UP (ref 3.8–5.2)
RBC # FLD: 13.2 % — SIGNIFICANT CHANGE UP (ref 10.3–14.5)
RH IG SCN BLD-IMP: POSITIVE — SIGNIFICANT CHANGE UP
SODIUM SERPL-SCNC: 141 MMOL/L — SIGNIFICANT CHANGE UP (ref 135–145)
WBC # BLD: 11.11 K/UL — HIGH (ref 3.8–10.5)
WBC # FLD AUTO: 11.11 K/UL — HIGH (ref 3.8–10.5)

## 2019-03-12 PROCEDURE — 93571 IV DOP VEL&/PRESS C FLO 1ST: CPT | Mod: 26,LD

## 2019-03-12 PROCEDURE — 99233 SBSQ HOSP IP/OBS HIGH 50: CPT | Mod: GC

## 2019-03-12 PROCEDURE — 93460 R&L HRT ART/VENTRICLE ANGIO: CPT | Mod: 26,GC

## 2019-03-12 PROCEDURE — 76937 US GUIDE VASCULAR ACCESS: CPT | Mod: 26,GC

## 2019-03-12 PROCEDURE — 93572 IV DOP VEL&/PRESS C FLO EA: CPT | Mod: 26,LC

## 2019-03-12 PROCEDURE — 93010 ELECTROCARDIOGRAM REPORT: CPT

## 2019-03-12 RX ORDER — INSULIN LISPRO 100/ML
2 VIAL (ML) SUBCUTANEOUS
Qty: 0 | Refills: 0 | Status: DISCONTINUED | OUTPATIENT
Start: 2019-03-12 | End: 2019-03-13

## 2019-03-12 RX ORDER — CLOPIDOGREL BISULFATE 75 MG/1
600 TABLET, FILM COATED ORAL ONCE
Qty: 0 | Refills: 0 | Status: DISCONTINUED | OUTPATIENT
Start: 2019-03-12 | End: 2019-03-12

## 2019-03-12 RX ORDER — CLOPIDOGREL BISULFATE 75 MG/1
75 TABLET, FILM COATED ORAL DAILY
Qty: 0 | Refills: 0 | Status: DISCONTINUED | OUTPATIENT
Start: 2019-03-13 | End: 2019-03-19

## 2019-03-12 RX ADMIN — Medication 8: at 12:21

## 2019-03-12 RX ADMIN — CLOPIDOGREL BISULFATE 75 MILLIGRAM(S): 75 TABLET, FILM COATED ORAL at 11:26

## 2019-03-12 RX ADMIN — HEPARIN SODIUM 5000 UNIT(S): 5000 INJECTION INTRAVENOUS; SUBCUTANEOUS at 06:17

## 2019-03-12 RX ADMIN — Medication 100 MILLIGRAM(S): at 13:37

## 2019-03-12 RX ADMIN — Medication 2: at 08:05

## 2019-03-12 RX ADMIN — ATORVASTATIN CALCIUM 40 MILLIGRAM(S): 80 TABLET, FILM COATED ORAL at 22:52

## 2019-03-12 RX ADMIN — Medication 100 MILLIGRAM(S): at 00:23

## 2019-03-12 RX ADMIN — Medication 1 SPRAY(S): at 22:51

## 2019-03-12 RX ADMIN — INSULIN GLARGINE 10 UNIT(S): 100 INJECTION, SOLUTION SUBCUTANEOUS at 22:28

## 2019-03-12 RX ADMIN — Medication 3 MILLILITER(S): at 22:52

## 2019-03-12 RX ADMIN — Medication 2: at 22:29

## 2019-03-12 RX ADMIN — Medication 3 MILLILITER(S): at 01:07

## 2019-03-12 RX ADMIN — DORZOLAMIDE HYDROCHLORIDE 1 DROP(S): 20 SOLUTION/ DROPS OPHTHALMIC at 22:52

## 2019-03-12 RX ADMIN — Medication 100 MILLIGRAM(S): at 22:53

## 2019-03-12 RX ADMIN — Medication 3 MILLILITER(S): at 10:48

## 2019-03-12 RX ADMIN — Medication 100 MILLIGRAM(S): at 06:24

## 2019-03-12 RX ADMIN — Medication 3 MILLILITER(S): at 06:17

## 2019-03-12 RX ADMIN — Medication 2 UNIT(S): at 19:56

## 2019-03-12 RX ADMIN — NYSTATIN CREAM 1 APPLICATION(S): 100000 CREAM TOPICAL at 06:18

## 2019-03-12 RX ADMIN — Medication 100 MILLIGRAM(S): at 06:18

## 2019-03-12 RX ADMIN — BRIMONIDINE TARTRATE 1 DROP(S): 2 SOLUTION/ DROPS OPHTHALMIC at 06:19

## 2019-03-12 RX ADMIN — Medication 4: at 19:55

## 2019-03-12 RX ADMIN — DORZOLAMIDE HYDROCHLORIDE 1 DROP(S): 20 SOLUTION/ DROPS OPHTHALMIC at 06:18

## 2019-03-12 RX ADMIN — NYSTATIN CREAM 1 APPLICATION(S): 100000 CREAM TOPICAL at 22:53

## 2019-03-12 RX ADMIN — BRIMONIDINE TARTRATE 1 DROP(S): 2 SOLUTION/ DROPS OPHTHALMIC at 22:54

## 2019-03-12 RX ADMIN — Medication 1 SPRAY(S): at 06:18

## 2019-03-12 RX ADMIN — LATANOPROST 1 DROP(S): 0.05 SOLUTION/ DROPS OPHTHALMIC; TOPICAL at 22:52

## 2019-03-12 RX ADMIN — Medication 100 MILLIGRAM(S): at 11:26

## 2019-03-12 RX ADMIN — Medication 81 MILLIGRAM(S): at 11:26

## 2019-03-12 NOTE — PROGRESS NOTE ADULT - SUBJECTIVE AND OBJECTIVE BOX
Dr. Ren Fierro   Internal Medicine PGY-1  Pager #[phone]    Patient is a 73y old  Female who presents with a chief complaint of NSTEMI, acute hypoxic respiratory failure, acute decompensated heart failure, FLORIDALMA, influenza A (11 Mar 2019 07:13)      SUBJECTIVE / OVERNIGHT EVENTS:  DIAN ON. No CP, SOB, MORTON, or abdominal pain. Pending [facility].     MEDICATIONS  (STANDING):  ALBUTerol/ipratropium for Nebulization 3 milliLiter(s) Nebulizer every 4 hours  aspirin enteric coated 81 milliGRAM(s) Oral daily  atorvastatin 40 milliGRAM(s) Oral at bedtime  brimonidine 0.2% Ophthalmic Solution 1 Drop(s) Both EYES two times a day  clopidogrel Tablet 75 milliGRAM(s) Oral daily  dextrose 5%. 1000 milliLiter(s) (50 mL/Hr) IV Continuous <Continuous>  dextrose 50% Injectable 12.5 Gram(s) IV Push once  dextrose 50% Injectable 25 Gram(s) IV Push once  dextrose 50% Injectable 25 Gram(s) IV Push once  dorzolamide 2% Ophthalmic Solution 1 Drop(s) Both EYES three times a day  fluticasone propionate 50 MICROgram(s)/spray Nasal Spray 1 Spray(s) Both Nostrils two times a day  heparin  Injectable 5000 Unit(s) SubCutaneous every 12 hours  influenza   Vaccine 0.5 milliLiter(s) IntraMuscular once  insulin glargine Injectable (LANTUS) 10 Unit(s) SubCutaneous at bedtime  insulin lispro (HumaLOG) corrective regimen sliding scale   SubCutaneous three times a day before meals  insulin lispro (HumaLOG) corrective regimen sliding scale   SubCutaneous at bedtime  latanoprost 0.005% Ophthalmic Solution 1 Drop(s) Both EYES at bedtime  metoprolol succinate  milliGRAM(s) Oral daily  nystatin Powder 1 Application(s) Topical two times a day    MEDICATIONS  (PRN):  ALBUTerol    90 MICROgram(s) HFA Inhaler 1 Puff(s) Inhalation every 4 hours PRN Shortness of Breath and/or Wheezing  benzonatate 100 milliGRAM(s) Oral three times a day PRN Cough  dextrose 40% Gel 15 Gram(s) Oral once PRN Blood Glucose LESS THAN 70 milliGRAM(s)/deciliter  glucagon  Injectable 1 milliGRAM(s) IntraMuscular once PRN Glucose LESS THAN 70 milligrams/deciliter  guaiFENesin   Syrup  (Sugar-Free) 100 milliGRAM(s) Oral every 6 hours PRN Cough      Vital Signs Last 24 Hrs  T(C): 36.7 (12 Mar 2019 04:40), Max: 37 (11 Mar 2019 13:30)  T(F): 98 (12 Mar 2019 04:40), Max: 98.6 (11 Mar 2019 13:30)  HR: 84 (12 Mar 2019 04:40) (84 - 93)  BP: 135/74 (12 Mar 2019 04:40) (130/70 - 148/77)  BP(mean): --  RR: 17 (12 Mar 2019 04:40) (17 - 18)  SpO2: 94% (12 Mar 2019 04:40) (94% - 97%)  CAPILLARY BLOOD GLUCOSE      POCT Blood Glucose.: 223 mg/dL (11 Mar 2019 21:41)  POCT Blood Glucose.: 152 mg/dL (11 Mar 2019 17:34)  POCT Blood Glucose.: 200 mg/dL (11 Mar 2019 11:49)  POCT Blood Glucose.: 117 mg/dL (11 Mar 2019 07:28)    I&O's Summary    10 Mar 2019 07:01  -  11 Mar 2019 07:00  --------------------------------------------------------  IN: 900 mL / OUT: 600 mL / NET: 300 mL    11 Mar 2019 07:01  -  12 Mar 2019 06:12  --------------------------------------------------------  IN: 840 mL / OUT: 675 mL / NET: 165 mL        PHYSICAL EXAM  GENERAL: NAD, well-developed, found sitting comfortably in bed  HEAD:  Atraumatic, Normocephalic  EYES: EOMI, PERRLA, conjunctiva and sclera clear  NECK: Supple, No JVD  CHEST/LUNG: Expiratory wheeze   HEART: Regular rate and rhythm; No murmur  ABDOMEN: Soft, Nontender, Nondistended; Bowel sounds present  EXTREMITIES:  No LE edema   PSYCH: AAOx3  NEUROLOGY: non-focal  SKIN: Erythema under stomach folds of skin above the pelvis. Improved erythema of the vulva.     LABS:                        12.9   11.67 )-----------( 309      ( 11 Mar 2019 08:02 )             40.7     03-11    140  |  103  |  70<H>  ----------------------------<  115<H>  4.3   |  25  |  1.45<H>    Ca    9.5      11 Mar 2019 05:56  Phos  3.6     03-11  Mg     3.0     03-11      PT/INR - ( 11 Mar 2019 09:27 )   PT: 11.3 sec;   INR: 0.99 ratio         PTT - ( 11 Mar 2019 09:27 )  PTT:32.0 sec          RADIOLOGY & ADDITIONAL TESTS:    Imaging Personally Reviewed:    Consultant(s) Notes Reviewed:      Care Discussed with Consultants/Other Providers:

## 2019-03-12 NOTE — PROGRESS NOTE ADULT - PROBLEM SELECTOR PLAN 5
2/2 ADHF 2/2 flu A +  - 2/2 pulmonary edema, asthma exacerbation, and influenza A.   - currently off of any O2 and satting well, holding further lasix doses at this time given FLORIDALMA  - Management as above for HF and asthma exacerbation  - Duonebs Q6H PRN wheeze

## 2019-03-12 NOTE — PROGRESS NOTE ADULT - ASSESSMENT
73yoF w/ PMHx significant for HTN, HLD, CAD, DM2 (on Lantus, glimepiride), asthma, glaucoma, uterine cancer s/p RT and MAYNOR-BSO presents as transfer from [facility] for NSTEMI, FLORIDALMA, acute hypoxic respiratory failure 2/2 acute decompensated heart failure, asthma exacerbation, and influenza A, now being medically optimized for [facility] pending FLORIDALMA resolution. 73yoF w/ PMHx significant for HTN, HLD, CAD, DM2 (on Lantus, glimepiride), asthma, glaucoma, uterine cancer s/p RT and MAYNOR-BSO presents as transfer from [facility] for NSTEMI, FLORIDALMA, acute hypoxic respiratory failure 2/2 acute decompensated heart failure, asthma exacerbation, and influenza A, to get [facility] today as FLORIDALMA resolved.

## 2019-03-12 NOTE — PROGRESS NOTE ADULT - PROBLEM SELECTOR PLAN 3
Baseline 1.06-1.21 per PCP office - IMPROVING  - Urine lytes with Fe Urea of 30% consistent with prerenal disease, discontinued lasix last night with improvement in creatinine    - Continue to monitor, daily BMP   - strict I/Os, avoid nephrotoxins, renally dose meds  - continue to hold home oxybutin as can cause retention Baseline 1.06-1.21 per PCP office - RESOLVED  - Urine lytes with Fe Urea of 30% consistent with prerenal disease, discontinued lasix last night with improvement in creatinine    - Continue to monitor, daily BMP   - strict I/Os, avoid nephrotoxins, renally dose meds  - continue to hold home oxybutin as can cause retention

## 2019-03-12 NOTE — PROGRESS NOTE ADULT - PROBLEM SELECTOR PLAN 2
2/2 NSTEMI, mod-severe AS, and asthma exacerbation, required BIPAP at OSH, now on RA   - Unreliable I/O due to failed female urine condom, so daily standing weights  -[facility] pending FLORIDALMA resolution 2/2 NSTEMI, mod-severe AS, and asthma exacerbation, required BIPAP at OSH, now on RA   - Unreliable I/O due to failed female urine condom, so daily standing weights

## 2019-03-12 NOTE — PROGRESS NOTE ADULT - PROBLEM SELECTOR PLAN 1
- transferred from OSH with NSTEMI, EKG shows old LBBB. Previous cardiac cath showed mild diffuse CAD  - Cards following - continue to medically optimize for cardiac catheterization, with tentative plan for LHC pending creatinine  - c/w ASA, plavix,   - c/w metoprolol succinate 100 daily  - c/w statin  - Hold ARB 2/2 FLORIDALMA and plan for cath  - Trend BMP, keep K>4, Mg>2  - Continue to monitor on telemetry - transferred from OSH with NSTEMI, EKG shows old LBBB. Previous cardiac cath showed mild diffuse CAD  - Cards recs:  -LHC today  - c/w ASA, plavix,   - c/w metoprolol succinate 100 daily  - c/w statin  - Hold ARB 2/2 FLORIDALMA and plan for cath  - Trend BMP, keep K>4, Mg>2  - Continue to monitor on telemetry

## 2019-03-12 NOTE — PROGRESS NOTE ADULT - PROBLEM SELECTOR PROBLEM 2
Acute systolic heart failure

## 2019-03-12 NOTE — PROGRESS NOTE ADULT - PROBLEM SELECTOR PLAN 4
Vaginal erythema and pruritis likely 2/2 Vulvovaginitis - IMPROVED  - c/w nystatin powder overnight   - Likely irritant dermatitis from urine

## 2019-03-12 NOTE — PROGRESS NOTE ADULT - PROBLEM SELECTOR PLAN 10
VTE ppx: HSQ  Diet: DASH, CC  Dispo: Patient improved from respiratory standpoint, now off of O2 and doing well. chest pain free. Cardiac enzymes downtrended last night, tentative LHC tomorrow as per cards last note. Continue with daily weights as patient could not tolerate matos. Daily weights - - can restart ARB tomorrow if LHC gets pushed off otherwise, continue to hold until after procedure     Ren Fierro MD/MS  PGY-1 Internal Medicine  [facility]  Pager# [phone] ([facility])/[phone] ([facility])

## 2019-03-12 NOTE — PROGRESS NOTE ADULT - ASSESSMENT
A/P: 73 year old F pt with PMH of HTN, HLD, CAD, DM2, and uterine cancer s/p MAYNOR-BSO presented as a transfer from [place] for NSTEMI in the setting of influenza A.  Found to have severe segmental LV dysfunction at OSH.  Currently feeling better from a FLU standpoint (off isolation).  Euvolemic, chest pain free.     RECS  - cont dapt, atorvastatin 40 mg daily, metop succinate 100mg daily  - f/u Cr, plan for [facility] today    Amrit Duckworth MD  Cardiology fellow  [phone]

## 2019-03-12 NOTE — PROGRESS NOTE ADULT - ATTENDING COMMENTS
73 year old woman with severe LV dysfunction, presented with respiratory distress, chest heaviness and flu positive. Troponin elevated, EKG LBBB. Transferred for coronary angiography, deferred pending resolution of FLORIDALMA. Creatinine declining steadily and now down to baseline, ready for procedure today.

## 2019-03-12 NOTE — PROGRESS NOTE ADULT - PROBLEM SELECTOR PROBLEM 7
Type 2 diabetes mellitus with hypoglycemia without coma, with long-term current use of insulin

## 2019-03-12 NOTE — PROGRESS NOTE ADULT - SUBJECTIVE AND OBJECTIVE BOX
Patient seen and examined at bedside.    Overnight Events: NAEO, No complaints.    Review Of Systems:   CONSTITUTIONAL: weakness improved, no diaphoresis and cough improving  EYES/ENT: No visual changes;  No dysphagia  NECK: No pain or stiffness  RESPIRATORY: cough improving, no wheezing, no hemoptysis; No shortness of breath  CARDIOVASCULAR: No chest pain or palpitations; No lower extremity edema  GASTROINTESTINAL: No abdominal or epigastric pain. No nausea, vomiting, or hematemesis; No diarrhea or constipation. No melena or hematochezia.  BACK: No back pain  GENITOURINARY: No dysuria, frequency or hematuria  NEUROLOGICAL: No numbness or weakness  SKIN: No itching, burning, rashes, or lesions            Current Meds:  ALBUTerol    90 MICROgram(s) HFA Inhaler 1 Puff(s) Inhalation every 4 hours PRN  ALBUTerol/ipratropium for Nebulization 3 milliLiter(s) Nebulizer every 4 hours  aspirin enteric coated 81 milliGRAM(s) Oral daily  atorvastatin 40 milliGRAM(s) Oral at bedtime  benzonatate 100 milliGRAM(s) Oral three times a day PRN  brimonidine 0.2% Ophthalmic Solution 1 Drop(s) Both EYES two times a day  clopidogrel Tablet 75 milliGRAM(s) Oral daily  dextrose 40% Gel 15 Gram(s) Oral once PRN  dextrose 5%. 1000 milliLiter(s) IV Continuous <Continuous>  dextrose 50% Injectable 12.5 Gram(s) IV Push once  dextrose 50% Injectable 25 Gram(s) IV Push once  dextrose 50% Injectable 25 Gram(s) IV Push once  dorzolamide 2% Ophthalmic Solution 1 Drop(s) Both EYES three times a day  fluticasone propionate 50 MICROgram(s)/spray Nasal Spray 1 Spray(s) Both Nostrils two times a day  glucagon  Injectable 1 milliGRAM(s) IntraMuscular once PRN  guaiFENesin   Syrup  (Sugar-Free) 100 milliGRAM(s) Oral every 6 hours PRN  heparin  Injectable 5000 Unit(s) SubCutaneous every 12 hours  influenza   Vaccine 0.5 milliLiter(s) IntraMuscular once  insulin glargine Injectable (LANTUS) 10 Unit(s) SubCutaneous at bedtime  insulin lispro (HumaLOG) corrective regimen sliding scale   SubCutaneous three times a day before meals  insulin lispro (HumaLOG) corrective regimen sliding scale   SubCutaneous at bedtime  latanoprost 0.005% Ophthalmic Solution 1 Drop(s) Both EYES at bedtime  metoprolol succinate  milliGRAM(s) Oral daily  nystatin Powder 1 Application(s) Topical two times a day      Vitals:  T(F): 98 (03-12), Max: 98.6 (03-11)  HR: 84 (03-12) (84 - 93)  BP: 135/74 (03-12) (130/70 - 148/77)  RR: 17 (03-12)  SpO2: 94% (03-12)  I&O's Summary    11 Mar 2019 07:01  -  12 Mar 2019 07:00  --------------------------------------------------------  IN: 840 mL / OUT: 675 mL / NET: 165 mL      Physical Exam:  Appearance: No acute distress; well appearing  HEENT:  EOMI, sclera anicteric, Normal oral mucosa  Cardiovascular: RRR, S1, S2, no murmurs, rubs, or gallops; no edema; no JVD  Respiratory: Clear to auscultation bilaterally, no wheezes, rales, rhonchi  Gastrointestinal: soft, non-tender, non-distended with normal bowel sounds  Musculoskeletal: No clubbing; no joint deformity   Neurologic: Non-focal  Lymphatic: No lymphadenopathy  Psychiatry: AAOx3, mood & affect appropriate  Skin: No rashes, ecchymoses, or cyanosis                        12.9   11.67 )-----------( 309      ( 11 Mar 2019 08:02 )             40.7     03-11    140  |  103  |  70<H>  ----------------------------<  115<H>  4.3   |  25  |  1.45<H>    Ca    9.5      11 Mar 2019 05:56  Phos  3.6     03-11  Mg     3.0     03-11      PT/INR - ( 11 Mar 2019 09:27 )   PT: 11.3 sec;   INR: 0.99 ratio         PTT - ( 11 Mar 2019 09:27 )  PTT:32.0 sec  CARDIAC MARKERS ( 09 Mar 2019 11:45 )  377 ng/L / x     / x     / 422 U/L / x     / 3.2 ng/mL  CARDIAC MARKERS ( 07 Mar 2019 17:07 )  x     / 3.950 ng/mL / x     / 863 U/L / x     / 5.6 ng/mL  CARDIAC MARKERS ( 07 Mar 2019 05:20 )  x     / 6.020 ng/mL / x     / 646 U/L / x     / 8.8 ng/mL  CARDIAC MARKERS ( 06 Mar 2019 19:30 )  x     / 4.030 ng/mL / x     / 406 U/L / x     / 12.2 ng/mL  CARDIAC MARKERS ( 06 Mar 2019 10:54 )  x     / .296 ng/mL / x     / x     / x     / 3.6 ng/mL      Serum Pro-Brain Natriuretic Peptide: 5803 pg/mL (03-06 @ 10:54)      Echo:  3/6/19  1. Decreased systolic function with a visually estimated left ventricular   ejection fraction of 25-30%.  2. Multiple regional wall motion abnormalities as described above.  3. LV diastolic dysfunction with evidence of elevated filling pressures.  4. Moderate posterior mitral annular calcification. Mild mitral valve   regurgitation.  5. Calcified trileaflet aortic valve. Moderate to severe aortic stenosis   and mild aortic valve insufficiency.  6. the right ventricle is grossly normal in size and systolic function.  7. Normal tricuspid valve with mild tricuspid valve regurgitation with   incomplete waveform.  8. Normal pulmonic valve with trace insufficiency.  9. Limited study to estimate pulmonary artery systolic pressure        Interpretation of Telemetry:  sinus rhythm, HR 60s-80s

## 2019-03-13 DIAGNOSIS — B37.3 CANDIDIASIS OF VULVA AND VAGINA: ICD-10-CM

## 2019-03-13 LAB
ANION GAP SERPL CALC-SCNC: 14 MMOL/L — SIGNIFICANT CHANGE UP (ref 5–17)
APTT BLD: 35.2 SEC — SIGNIFICANT CHANGE UP (ref 27.5–36.3)
BUN SERPL-MCNC: 50 MG/DL — HIGH (ref 7–23)
CALCIUM SERPL-MCNC: 9.6 MG/DL — SIGNIFICANT CHANGE UP (ref 8.4–10.5)
CHLORIDE SERPL-SCNC: 101 MMOL/L — SIGNIFICANT CHANGE UP (ref 96–108)
CO2 SERPL-SCNC: 25 MMOL/L — SIGNIFICANT CHANGE UP (ref 22–31)
CREAT SERPL-MCNC: 1.36 MG/DL — HIGH (ref 0.5–1.3)
GLUCOSE SERPL-MCNC: 161 MG/DL — HIGH (ref 70–99)
HCT VFR BLD CALC: 41.2 % — SIGNIFICANT CHANGE UP (ref 34.5–45)
HGB BLD-MCNC: 13 G/DL — SIGNIFICANT CHANGE UP (ref 11.5–15.5)
INR BLD: 1.08 RATIO — SIGNIFICANT CHANGE UP (ref 0.88–1.16)
MAGNESIUM SERPL-MCNC: 2.5 MG/DL — SIGNIFICANT CHANGE UP (ref 1.6–2.6)
MCHC RBC-ENTMCNC: 29.1 PG — SIGNIFICANT CHANGE UP (ref 27–34)
MCHC RBC-ENTMCNC: 31.6 GM/DL — LOW (ref 32–36)
MCV RBC AUTO: 92.4 FL — SIGNIFICANT CHANGE UP (ref 80–100)
PHOSPHATE SERPL-MCNC: 4.6 MG/DL — HIGH (ref 2.5–4.5)
PLATELET # BLD AUTO: 329 K/UL — SIGNIFICANT CHANGE UP (ref 150–400)
POTASSIUM SERPL-MCNC: 4.5 MMOL/L — SIGNIFICANT CHANGE UP (ref 3.5–5.3)
POTASSIUM SERPL-SCNC: 4.5 MMOL/L — SIGNIFICANT CHANGE UP (ref 3.5–5.3)
PROTHROM AB SERPL-ACNC: 12.4 SEC — SIGNIFICANT CHANGE UP (ref 10–13.1)
RBC # BLD: 4.46 M/UL — SIGNIFICANT CHANGE UP (ref 3.8–5.2)
RBC # FLD: 13.2 % — SIGNIFICANT CHANGE UP (ref 10.3–14.5)
SODIUM SERPL-SCNC: 140 MMOL/L — SIGNIFICANT CHANGE UP (ref 135–145)
WBC # BLD: 11.86 K/UL — HIGH (ref 3.8–10.5)
WBC # FLD AUTO: 11.86 K/UL — HIGH (ref 3.8–10.5)

## 2019-03-13 PROCEDURE — 99233 SBSQ HOSP IP/OBS HIGH 50: CPT | Mod: GC

## 2019-03-13 RX ORDER — INSULIN GLARGINE 100 [IU]/ML
18 INJECTION, SOLUTION SUBCUTANEOUS AT BEDTIME
Qty: 0 | Refills: 0 | Status: DISCONTINUED | OUTPATIENT
Start: 2019-03-13 | End: 2019-03-14

## 2019-03-13 RX ORDER — FUROSEMIDE 40 MG
40 TABLET ORAL DAILY
Qty: 0 | Refills: 0 | Status: DISCONTINUED | OUTPATIENT
Start: 2019-03-13 | End: 2019-03-13

## 2019-03-13 RX ORDER — FUROSEMIDE 40 MG
40 TABLET ORAL EVERY 12 HOURS
Qty: 0 | Refills: 0 | Status: DISCONTINUED | OUTPATIENT
Start: 2019-03-13 | End: 2019-03-18

## 2019-03-13 RX ORDER — IPRATROPIUM/ALBUTEROL SULFATE 18-103MCG
3 AEROSOL WITH ADAPTER (GRAM) INHALATION EVERY 6 HOURS
Qty: 0 | Refills: 0 | Status: DISCONTINUED | OUTPATIENT
Start: 2019-03-13 | End: 2019-03-19

## 2019-03-13 RX ORDER — INSULIN LISPRO 100/ML
4 VIAL (ML) SUBCUTANEOUS
Qty: 0 | Refills: 0 | Status: DISCONTINUED | OUTPATIENT
Start: 2019-03-13 | End: 2019-03-14

## 2019-03-13 RX ADMIN — Medication 4 UNIT(S): at 08:16

## 2019-03-13 RX ADMIN — HEPARIN SODIUM 5000 UNIT(S): 5000 INJECTION INTRAVENOUS; SUBCUTANEOUS at 05:49

## 2019-03-13 RX ADMIN — DORZOLAMIDE HYDROCHLORIDE 1 DROP(S): 20 SOLUTION/ DROPS OPHTHALMIC at 13:17

## 2019-03-13 RX ADMIN — Medication 100 MILLIGRAM(S): at 22:06

## 2019-03-13 RX ADMIN — CLOPIDOGREL BISULFATE 75 MILLIGRAM(S): 75 TABLET, FILM COATED ORAL at 11:42

## 2019-03-13 RX ADMIN — NYSTATIN CREAM 1 APPLICATION(S): 100000 CREAM TOPICAL at 17:18

## 2019-03-13 RX ADMIN — DORZOLAMIDE HYDROCHLORIDE 1 DROP(S): 20 SOLUTION/ DROPS OPHTHALMIC at 05:50

## 2019-03-13 RX ADMIN — Medication 3 MILLILITER(S): at 17:19

## 2019-03-13 RX ADMIN — Medication 100 MILLIGRAM(S): at 23:28

## 2019-03-13 RX ADMIN — Medication 1 SPRAY(S): at 05:50

## 2019-03-13 RX ADMIN — Medication 4: at 12:25

## 2019-03-13 RX ADMIN — Medication 2: at 17:19

## 2019-03-13 RX ADMIN — Medication 2: at 08:16

## 2019-03-13 RX ADMIN — NYSTATIN CREAM 1 APPLICATION(S): 100000 CREAM TOPICAL at 05:50

## 2019-03-13 RX ADMIN — Medication 100 MILLIGRAM(S): at 17:21

## 2019-03-13 RX ADMIN — HEPARIN SODIUM 5000 UNIT(S): 5000 INJECTION INTRAVENOUS; SUBCUTANEOUS at 17:18

## 2019-03-13 RX ADMIN — Medication 81 MILLIGRAM(S): at 11:42

## 2019-03-13 RX ADMIN — Medication 100 MILLIGRAM(S): at 05:49

## 2019-03-13 RX ADMIN — Medication 3 MILLILITER(S): at 23:28

## 2019-03-13 RX ADMIN — Medication 3 MILLILITER(S): at 11:41

## 2019-03-13 RX ADMIN — BRIMONIDINE TARTRATE 1 DROP(S): 2 SOLUTION/ DROPS OPHTHALMIC at 22:05

## 2019-03-13 RX ADMIN — Medication 4 UNIT(S): at 17:19

## 2019-03-13 RX ADMIN — LATANOPROST 1 DROP(S): 0.05 SOLUTION/ DROPS OPHTHALMIC; TOPICAL at 22:05

## 2019-03-13 RX ADMIN — Medication 40 MILLIGRAM(S): at 17:18

## 2019-03-13 RX ADMIN — DORZOLAMIDE HYDROCHLORIDE 1 DROP(S): 20 SOLUTION/ DROPS OPHTHALMIC at 22:05

## 2019-03-13 RX ADMIN — Medication 3 MILLILITER(S): at 13:17

## 2019-03-13 RX ADMIN — Medication 1 SPRAY(S): at 17:19

## 2019-03-13 RX ADMIN — Medication 3 MILLILITER(S): at 05:50

## 2019-03-13 RX ADMIN — INSULIN GLARGINE 18 UNIT(S): 100 INJECTION, SOLUTION SUBCUTANEOUS at 22:03

## 2019-03-13 RX ADMIN — BRIMONIDINE TARTRATE 1 DROP(S): 2 SOLUTION/ DROPS OPHTHALMIC at 05:51

## 2019-03-13 RX ADMIN — Medication 4 UNIT(S): at 12:24

## 2019-03-13 RX ADMIN — Medication 40 MILLIGRAM(S): at 06:52

## 2019-03-13 RX ADMIN — ATORVASTATIN CALCIUM 40 MILLIGRAM(S): 80 TABLET, FILM COATED ORAL at 22:05

## 2019-03-13 NOTE — PROGRESS NOTE ADULT - SUBJECTIVE AND OBJECTIVE BOX
Dr. Ren Fierro   Internal Medicine PGY-1  Pager #[phone]    Patient is a 73y old  Female who presents with a chief complaint of NSTEMI, acute hypoxic respiratory failure, acute decompensated heart failure, FLORIDALMA, influenza A (13 Mar 2019 05:42)      SUBJECTIVE / OVERNIGHT EVENTS:  S/p LHC with mLAD 80%, LCx 70%(both significant by iFR.) No intervention performed due to elevated LVEDP of 40    Tele: NSR 60-80s      MEDICATIONS  (STANDING):  ALBUTerol/ipratropium for Nebulization 3 milliLiter(s) Nebulizer every 4 hours  aspirin enteric coated 81 milliGRAM(s) Oral daily  atorvastatin 40 milliGRAM(s) Oral at bedtime  brimonidine 0.2% Ophthalmic Solution 1 Drop(s) Both EYES two times a day  clopidogrel Tablet 75 milliGRAM(s) Oral daily  dextrose 5%. 1000 milliLiter(s) (50 mL/Hr) IV Continuous <Continuous>  dextrose 50% Injectable 12.5 Gram(s) IV Push once  dextrose 50% Injectable 25 Gram(s) IV Push once  dextrose 50% Injectable 25 Gram(s) IV Push once  dorzolamide 2% Ophthalmic Solution 1 Drop(s) Both EYES three times a day  fluticasone propionate 50 MICROgram(s)/spray Nasal Spray 1 Spray(s) Both Nostrils two times a day  heparin  Injectable 5000 Unit(s) SubCutaneous every 12 hours  influenza   Vaccine 0.5 milliLiter(s) IntraMuscular once  insulin glargine Injectable (LANTUS) 10 Unit(s) SubCutaneous at bedtime  insulin lispro (HumaLOG) corrective regimen sliding scale   SubCutaneous three times a day before meals  insulin lispro (HumaLOG) corrective regimen sliding scale   SubCutaneous at bedtime  insulin lispro Injectable (HumaLOG) 2 Unit(s) SubCutaneous three times a day before meals  latanoprost 0.005% Ophthalmic Solution 1 Drop(s) Both EYES at bedtime  metoprolol succinate  milliGRAM(s) Oral daily  nystatin Powder 1 Application(s) Topical two times a day    MEDICATIONS  (PRN):  benzonatate 100 milliGRAM(s) Oral three times a day PRN Cough  dextrose 40% Gel 15 Gram(s) Oral once PRN Blood Glucose LESS THAN 70 milliGRAM(s)/deciliter  glucagon  Injectable 1 milliGRAM(s) IntraMuscular once PRN Glucose LESS THAN 70 milligrams/deciliter  guaiFENesin   Syrup  (Sugar-Free) 100 milliGRAM(s) Oral every 6 hours PRN Cough      Vital Signs Last 24 Hrs  T(C): 36.7 (13 Mar 2019 05:13), Max: 36.7 (13 Mar 2019 05:13)  T(F): 98 (13 Mar 2019 05:13), Max: 98 (13 Mar 2019 05:13)  HR: 79 (13 Mar 2019 05:13) (79 - 94)  BP: 130/71 (13 Mar 2019 05:13) (127/74 - 156/82)  BP(mean): --  RR: 18 (13 Mar 2019 05:13) (18 - 18)  SpO2: 99% (13 Mar 2019 05:13) (97% - 99%)  CAPILLARY BLOOD GLUCOSE      POCT Blood Glucose.: 258 mg/dL (12 Mar 2019 21:49)  POCT Blood Glucose.: 250 mg/dL (12 Mar 2019 19:47)  POCT Blood Glucose.: 315 mg/dL (12 Mar 2019 12:15)  POCT Blood Glucose.: 168 mg/dL (12 Mar 2019 07:52)    I&O's Summary    11 Mar 2019 07:01  -  12 Mar 2019 07:00  --------------------------------------------------------  IN: 960 mL / OUT: 675 mL / NET: 285 mL    12 Mar 2019 07:01  -  13 Mar 2019 06:26  --------------------------------------------------------  IN: 660 mL / OUT: 0 mL / NET: 660 mL        PHYSICAL EXAM  GENERAL: NAD, well-developed, found sitting comfortably in bed  HEAD:  Atraumatic, Normocephalic  EYES: EOMI, PERRLA, conjunctiva and sclera clear  NECK: Supple, No JVD  CHEST/LUNG: Expiratory wheeze   HEART: Regular rate and rhythm; No murmur  ABDOMEN: Soft, Nontender, Nondistended; Bowel sounds present  EXTREMITIES:  No LE edema   PSYCH: AAOx3  NEUROLOGY: non-focal  SKIN: Erythema under stomach folds of skin above the pelvis. Improved erythema of the vulva.    LABS:                        12.7   11.11 )-----------( 322      ( 12 Mar 2019 10:49 )             40.6     03-13    140  |  101  |  50<H>  ----------------------------<  161<H>  4.5   |  25  |  1.36<H>    Ca    9.6      13 Mar 2019 05:30  Phos  4.6     03-13  Mg     2.5     03-13      PT/INR - ( 12 Mar 2019 09:38 )   PT: 11.6 sec;   INR: 1.01 ratio         PTT - ( 12 Mar 2019 09:38 )  PTT:34.8 sec          RADIOLOGY & ADDITIONAL TESTS:  3/13/19  RHC /LHC MID LAD with IFR .6 80% occlusion, 70% to CX IFR .89  PCWP 19 LVEDP 40 CI 2.42 RV 42/8 Dr. Ren Fierro   Internal Medicine PGY-1  Pager #[phone]    Patient is a 73y old  Female who presents with a chief complaint of NSTEMI, acute hypoxic respiratory failure, acute decompensated heart failure, FLORIDALMA, influenza A (13 Mar 2019 05:42)      SUBJECTIVE / OVERNIGHT EVENTS:  S/p LHC with mLAD 80%, LCx 70%(both significant by iFR.) No intervention performed due to elevated LVEDP of 40. No CP, SOB, or abdominal pain. No N/V, diaphoresis or palpitations. No orthopnea.     Tele: NSR 60-80s      MEDICATIONS  (STANDING):  ALBUTerol/ipratropium for Nebulization 3 milliLiter(s) Nebulizer every 4 hours  aspirin enteric coated 81 milliGRAM(s) Oral daily  atorvastatin 40 milliGRAM(s) Oral at bedtime  brimonidine 0.2% Ophthalmic Solution 1 Drop(s) Both EYES two times a day  clopidogrel Tablet 75 milliGRAM(s) Oral daily  dextrose 5%. 1000 milliLiter(s) (50 mL/Hr) IV Continuous <Continuous>  dextrose 50% Injectable 12.5 Gram(s) IV Push once  dextrose 50% Injectable 25 Gram(s) IV Push once  dextrose 50% Injectable 25 Gram(s) IV Push once  dorzolamide 2% Ophthalmic Solution 1 Drop(s) Both EYES three times a day  fluticasone propionate 50 MICROgram(s)/spray Nasal Spray 1 Spray(s) Both Nostrils two times a day  heparin  Injectable 5000 Unit(s) SubCutaneous every 12 hours  influenza   Vaccine 0.5 milliLiter(s) IntraMuscular once  insulin glargine Injectable (LANTUS) 10 Unit(s) SubCutaneous at bedtime  insulin lispro (HumaLOG) corrective regimen sliding scale   SubCutaneous three times a day before meals  insulin lispro (HumaLOG) corrective regimen sliding scale   SubCutaneous at bedtime  insulin lispro Injectable (HumaLOG) 2 Unit(s) SubCutaneous three times a day before meals  latanoprost 0.005% Ophthalmic Solution 1 Drop(s) Both EYES at bedtime  metoprolol succinate  milliGRAM(s) Oral daily  nystatin Powder 1 Application(s) Topical two times a day    MEDICATIONS  (PRN):  benzonatate 100 milliGRAM(s) Oral three times a day PRN Cough  dextrose 40% Gel 15 Gram(s) Oral once PRN Blood Glucose LESS THAN 70 milliGRAM(s)/deciliter  glucagon  Injectable 1 milliGRAM(s) IntraMuscular once PRN Glucose LESS THAN 70 milligrams/deciliter  guaiFENesin   Syrup  (Sugar-Free) 100 milliGRAM(s) Oral every 6 hours PRN Cough      Vital Signs Last 24 Hrs  T(C): 36.7 (13 Mar 2019 05:13), Max: 36.7 (13 Mar 2019 05:13)  T(F): 98 (13 Mar 2019 05:13), Max: 98 (13 Mar 2019 05:13)  HR: 79 (13 Mar 2019 05:13) (79 - 94)  BP: 130/71 (13 Mar 2019 05:13) (127/74 - 156/82)  BP(mean): --  RR: 18 (13 Mar 2019 05:13) (18 - 18)  SpO2: 99% (13 Mar 2019 05:13) (97% - 99%)  CAPILLARY BLOOD GLUCOSE      POCT Blood Glucose.: 258 mg/dL (12 Mar 2019 21:49)  POCT Blood Glucose.: 250 mg/dL (12 Mar 2019 19:47)  POCT Blood Glucose.: 315 mg/dL (12 Mar 2019 12:15)  POCT Blood Glucose.: 168 mg/dL (12 Mar 2019 07:52)    I&O's Summary    11 Mar 2019 07:01  -  12 Mar 2019 07:00  --------------------------------------------------------  IN: 960 mL / OUT: 675 mL / NET: 285 mL    12 Mar 2019 07:01  -  13 Mar 2019 06:26  --------------------------------------------------------  IN: 660 mL / OUT: 0 mL / NET: 660 mL        PHYSICAL EXAM  GENERAL: NAD, well-developed, found sitting comfortably in bed  HEAD:  Atraumatic, Normocephalic  EYES: EOMI, PERRLA, conjunctiva and sclera clear  NECK: Supple, No JVD  CHEST/LUNG: Expiratory wheeze   HEART: Regular rate and rhythm; No murmur  ABDOMEN: Soft, Nontender, Nondistended; Bowel sounds present  EXTREMITIES:  No LE edema   PSYCH: AAOx3  NEUROLOGY: non-focal  SKIN: Erythema under stomach folds of skin above the pelvis. Improved erythema of the vulva.    LABS:                        12.7   11.11 )-----------( 322      ( 12 Mar 2019 10:49 )             40.6     03-13    140  |  101  |  50<H>  ----------------------------<  161<H>  4.5   |  25  |  1.36<H>    Ca    9.6      13 Mar 2019 05:30  Phos  4.6     03-13  Mg     2.5     03-13      PT/INR - ( 12 Mar 2019 09:38 )   PT: 11.6 sec;   INR: 1.01 ratio         PTT - ( 12 Mar 2019 09:38 )  PTT:34.8 sec          RADIOLOGY & ADDITIONAL TESTS:  < from: Cardiac Cath Lab - Adult (03.12.19 @ 15:08) >  VENTRICLES: No left ventriculogram was performed.  CORONARY VESSELS: The coronary circulation is right dominant.  LM:   --  LM: Normal.  LAD:   --  Mid LAD: There was a diffuse 70 % stenosis. iFR = 0.77  CX:   --  Mid circumflex: There was a tubular 75 % stenosis. IFR =0.89  --  OM1: Normal.  RCA:   --  Proximal RCA: There was a 20 % stenosis.  --  Distal RCA: There was a 30 % stenosis.  COMPLICATIONS: There were no complications.  DIAGNOSTIC IMPRESSIONS: Elevated LVEDP. IFR abn Lcx and LAD disease  DIAGNOSTIC RECOMMENDATIONS: IV diuresis and med optimization. If clinically  indicated, would pursue PCI of the LAD and Lcx.    < end of copied text >

## 2019-03-13 NOTE — PROGRESS NOTE ADULT - ASSESSMENT
A/P: 73 year old F pt with PMH of HTN, HLD, CAD, DM2, and uterine cancer s/p MAYNOR-BSO presented as a transfer from [facility] for NSTEMI in the setting of influenza A.  Found to have severe segmental LV dysfunction at OSH.  Currently feeling better from a FLU standpoint (off isolation).  Euvolemic, chest pain free.     #CAD  LHC on 3/12 with mLAD 80%, LCx 70%(both significant by iFR.) No intervention performed due to elevated LVEDP of 40  - cont dapt, atorvastatin 40 mg daily, metop succinate 100mg daily  - lasix 40mg IV daily  - strict Is/Os  - daily weights  - will need to diurese prior to staged procedure for intervention on significant coronary lesions    #HFrEF  EF 25-30%. LHC as above  - continue metop succinate as above  - diuresis as above    Amrit Duckworth MD  Cardiology fellow  [phone]

## 2019-03-13 NOTE — PROGRESS NOTE ADULT - ATTENDING COMMENTS
73 year old woman with severe LV dysfunction, presented with respiratory distress, chest heaviness and flu positive. Troponin elevated, EKG LBBB. Transferred for coronary angiography, deferred pending resolution of FLORIDALMA. Creatinine declining steadily and down to baseline, procedure and has two lesions that contribute to ischemia based in IFR. However LVEDP markedly elevated. Thus need to diurese few days to optimize then return to cath lab. Started on furosemide 40 mg daily, but that will not be sufficient - increase to 40 mg BID. Anticipate some rise in BUN and creatinine.

## 2019-03-13 NOTE — PROGRESS NOTE ADULT - ATTENDING COMMENTS
s/p LHC showing  Mid LAD--> diffuse 70 % stenosis and  Mid circumflex 75% but with elevated LVEDP  diurese with lasix 40mg IV daily  monitor ins and outs  lizette today, continue to monitor  eventual plan for staged PCI

## 2019-03-13 NOTE — PROGRESS NOTE ADULT - SUBJECTIVE AND OBJECTIVE BOX
Patient seen and examined at bedside.    Overnight Events: Underwent LHC 3/12 found to have 2 significant lesions but LVEDP 40 so no intervention until medically optimized.    Review Of Systems:   CONSTITUTIONAL: weakness improved, no diaphoresis and cough improving  EYES/ENT: No visual changes;  No dysphagia  NECK: No pain or stiffness  RESPIRATORY: cough improving, no wheezing, no hemoptysis; No shortness of breath  CARDIOVASCULAR: No chest pain or palpitations; No lower extremity edema  GASTROINTESTINAL: No abdominal or epigastric pain. No nausea, vomiting, or hematemesis; No diarrhea or constipation. No melena or hematochezia.  BACK: No back pain  GENITOURINARY: No dysuria, frequency or hematuria  NEUROLOGICAL: No numbness or weakness  SKIN: No itching, burning, rashes, or lesions            Current Meds:  ALBUTerol/ipratropium for Nebulization 3 milliLiter(s) Nebulizer every 4 hours  aspirin enteric coated 81 milliGRAM(s) Oral daily  atorvastatin 40 milliGRAM(s) Oral at bedtime  benzonatate 100 milliGRAM(s) Oral three times a day PRN  brimonidine 0.2% Ophthalmic Solution 1 Drop(s) Both EYES two times a day  clopidogrel Tablet 75 milliGRAM(s) Oral daily  dextrose 40% Gel 15 Gram(s) Oral once PRN  dextrose 5%. 1000 milliLiter(s) IV Continuous <Continuous>  dextrose 50% Injectable 12.5 Gram(s) IV Push once  dextrose 50% Injectable 25 Gram(s) IV Push once  dextrose 50% Injectable 25 Gram(s) IV Push once  dorzolamide 2% Ophthalmic Solution 1 Drop(s) Both EYES three times a day  fluticasone propionate 50 MICROgram(s)/spray Nasal Spray 1 Spray(s) Both Nostrils two times a day  glucagon  Injectable 1 milliGRAM(s) IntraMuscular once PRN  guaiFENesin   Syrup  (Sugar-Free) 100 milliGRAM(s) Oral every 6 hours PRN  heparin  Injectable 5000 Unit(s) SubCutaneous every 12 hours  influenza   Vaccine 0.5 milliLiter(s) IntraMuscular once  insulin glargine Injectable (LANTUS) 10 Unit(s) SubCutaneous at bedtime  insulin lispro (HumaLOG) corrective regimen sliding scale   SubCutaneous three times a day before meals  insulin lispro (HumaLOG) corrective regimen sliding scale   SubCutaneous at bedtime  insulin lispro Injectable (HumaLOG) 2 Unit(s) SubCutaneous three times a day before meals  latanoprost 0.005% Ophthalmic Solution 1 Drop(s) Both EYES at bedtime  metoprolol succinate  milliGRAM(s) Oral daily  nystatin Powder 1 Application(s) Topical two times a day      Vitals:  T(F): 98 (03-13), Max: 98 (03-13)  HR: 79 (03-13) (79 - 94)  BP: 130/71 (03-13) (127/74 - 156/82)  RR: 18 (03-13)  SpO2: 99% (03-13)  I&O's Summary    11 Mar 2019 07:01  -  12 Mar 2019 07:00  --------------------------------------------------------  IN: 960 mL / OUT: 675 mL / NET: 285 mL    12 Mar 2019 07:01  -  13 Mar 2019 05:45  --------------------------------------------------------  IN: 660 mL / OUT: 0 mL / NET: 660 mL    Physical Exam:  Appearance: No acute distress; well appearing  HEENT:  EOMI, sclera anicteric, Normal oral mucosa  Cardiovascular: RRR, S1, S2, no murmurs, rubs, or gallops; no edema; no JVD  Respiratory: Clear to auscultation bilaterally, no wheezes, rales, rhonchi  Gastrointestinal: soft, non-tender, non-distended with normal bowel sounds  Musculoskeletal: No clubbing; no joint deformity   Neurologic: Non-focal  Lymphatic: No lymphadenopathy  Psychiatry: AAOx3, mood & affect appropriate  Skin: No rashes, ecchymoses, or cyanosis                        12.7   11.11 )-----------( 322      ( 12 Mar 2019 10:49 )             40.6     03-12    141  |  102  |  54<H>  ----------------------------<  152<H>  4.8   |  25  |  1.29    Ca    9.4      12 Mar 2019 07:02  Phos  3.8     03-12  Mg     2.9     03-12      PT/INR - ( 12 Mar 2019 09:38 )   PT: 11.6 sec;   INR: 1.01 ratio         PTT - ( 12 Mar 2019 09:38 )  PTT:34.8 sec  CARDIAC MARKERS ( 09 Mar 2019 11:45 )  377 ng/L / x     / x     / 422 U/L / x     / 3.2 ng/mL  CARDIAC MARKERS ( 07 Mar 2019 17:07 )  x     / 3.950 ng/mL / x     / 863 U/L / x     / 5.6 ng/mL  CARDIAC MARKERS ( 07 Mar 2019 05:20 )  x     / 6.020 ng/mL / x     / 646 U/L / x     / 8.8 ng/mL  CARDIAC MARKERS ( 06 Mar 2019 19:30 )  x     / 4.030 ng/mL / x     / 406 U/L / x     / 12.2 ng/mL  CARDIAC MARKERS ( 06 Mar 2019 10:54 )  x     / .296 ng/mL / x     / x     / x     / 3.6 ng/mL      Serum Pro-Brain Natriuretic Peptide: 5803 pg/mL (03-06 @ 10:54)      Echo:  3/6/19  1. Decreased systolic function with a visually estimated left ventricular   ejection fraction of 25-30%.  2. Multiple regional wall motion abnormalities as described above.  3. LV diastolic dysfunction with evidence of elevated filling pressures.  4. Moderate posterior mitral annular calcification. Mild mitral valve   regurgitation.  5. Calcified trileaflet aortic valve. Moderate to severe aortic stenosis   and mild aortic valve insufficiency.  6. the right ventricle is grossly normal in size and systolic function.  7. Normal tricuspid valve with mild tricuspid valve regurgitation with   incomplete waveform.  8. Normal pulmonic valve with trace insufficiency.  9. Limited study to estimate pulmonary artery systolic pressure        Cath:  3/13/19  RHC /LHC MID LAD with IFR .6 80% occlusion, 70% to CX IFR .89  PCWP 19 LVEDP 40 CI 2.42 RV 42/8    Interpretation of Telemetry:  sinus rhythm, HR 60s-80s Patient seen and examined at bedside.    Overnight Events: Underwent LHC 3/12 found to have 2 significant lesions but LVEDP 40 so no intervention until medically optimized. Complains of mild SOB.    Review Of Systems:   CONSTITUTIONAL: weakness improved, no diaphoresis and cough improving  EYES/ENT: No visual changes;  No dysphagia  NECK: No pain or stiffness  RESPIRATORY: cough improving, no wheezing, no hemoptysis; No shortness of breath  CARDIOVASCULAR: No chest pain or palpitations; No lower extremity edema  GASTROINTESTINAL: No abdominal or epigastric pain. No nausea, vomiting, or hematemesis; No diarrhea or constipation. No melena or hematochezia.  BACK: No back pain  GENITOURINARY: No dysuria, frequency or hematuria  NEUROLOGICAL: No numbness or weakness  SKIN: No itching, burning, rashes, or lesions            Current Meds:  ALBUTerol/ipratropium for Nebulization 3 milliLiter(s) Nebulizer every 4 hours  aspirin enteric coated 81 milliGRAM(s) Oral daily  atorvastatin 40 milliGRAM(s) Oral at bedtime  benzonatate 100 milliGRAM(s) Oral three times a day PRN  brimonidine 0.2% Ophthalmic Solution 1 Drop(s) Both EYES two times a day  clopidogrel Tablet 75 milliGRAM(s) Oral daily  dextrose 40% Gel 15 Gram(s) Oral once PRN  dextrose 5%. 1000 milliLiter(s) IV Continuous <Continuous>  dextrose 50% Injectable 12.5 Gram(s) IV Push once  dextrose 50% Injectable 25 Gram(s) IV Push once  dextrose 50% Injectable 25 Gram(s) IV Push once  dorzolamide 2% Ophthalmic Solution 1 Drop(s) Both EYES three times a day  fluticasone propionate 50 MICROgram(s)/spray Nasal Spray 1 Spray(s) Both Nostrils two times a day  glucagon  Injectable 1 milliGRAM(s) IntraMuscular once PRN  guaiFENesin   Syrup  (Sugar-Free) 100 milliGRAM(s) Oral every 6 hours PRN  heparin  Injectable 5000 Unit(s) SubCutaneous every 12 hours  influenza   Vaccine 0.5 milliLiter(s) IntraMuscular once  insulin glargine Injectable (LANTUS) 10 Unit(s) SubCutaneous at bedtime  insulin lispro (HumaLOG) corrective regimen sliding scale   SubCutaneous three times a day before meals  insulin lispro (HumaLOG) corrective regimen sliding scale   SubCutaneous at bedtime  insulin lispro Injectable (HumaLOG) 2 Unit(s) SubCutaneous three times a day before meals  latanoprost 0.005% Ophthalmic Solution 1 Drop(s) Both EYES at bedtime  metoprolol succinate  milliGRAM(s) Oral daily  nystatin Powder 1 Application(s) Topical two times a day      Vitals:  T(F): 98 (03-13), Max: 98 (03-13)  HR: 79 (03-13) (79 - 94)  BP: 130/71 (03-13) (127/74 - 156/82)  RR: 18 (03-13)  SpO2: 99% (03-13)  I&O's Summary    11 Mar 2019 07:01  -  12 Mar 2019 07:00  --------------------------------------------------------  IN: 960 mL / OUT: 675 mL / NET: 285 mL    12 Mar 2019 07:01  -  13 Mar 2019 05:45  --------------------------------------------------------  IN: 660 mL / OUT: 0 mL / NET: 660 mL    Physical Exam:  Appearance: No acute distress; well appearing  HEENT:  EOMI, sclera anicteric, Normal oral mucosa  Cardiovascular: RRR, S1, S2, no murmurs, rubs, or gallops; no edema; no JVD, R groin without hematoma, site c/d/i, distal pulses 2+  Respiratory: bibasilar crackles  Gastrointestinal: soft, non-tender, non-distended with normal bowel sounds  Musculoskeletal: No clubbing; no joint deformity   Neurologic: Non-focal  Lymphatic: No lymphadenopathy  Psychiatry: AAOx3, mood & affect appropriate  Skin: No rashes, ecchymoses, or cyanosis                        12.7   11.11 )-----------( 322      ( 12 Mar 2019 10:49 )             40.6     03-12    141  |  102  |  54<H>  ----------------------------<  152<H>  4.8   |  25  |  1.29    Ca    9.4      12 Mar 2019 07:02  Phos  3.8     03-12  Mg     2.9     03-12      PT/INR - ( 12 Mar 2019 09:38 )   PT: 11.6 sec;   INR: 1.01 ratio         PTT - ( 12 Mar 2019 09:38 )  PTT:34.8 sec  CARDIAC MARKERS ( 09 Mar 2019 11:45 )  377 ng/L / x     / x     / 422 U/L / x     / 3.2 ng/mL  CARDIAC MARKERS ( 07 Mar 2019 17:07 )  x     / 3.950 ng/mL / x     / 863 U/L / x     / 5.6 ng/mL  CARDIAC MARKERS ( 07 Mar 2019 05:20 )  x     / 6.020 ng/mL / x     / 646 U/L / x     / 8.8 ng/mL  CARDIAC MARKERS ( 06 Mar 2019 19:30 )  x     / 4.030 ng/mL / x     / 406 U/L / x     / 12.2 ng/mL  CARDIAC MARKERS ( 06 Mar 2019 10:54 )  x     / .296 ng/mL / x     / x     / x     / 3.6 ng/mL      Serum Pro-Brain Natriuretic Peptide: 5803 pg/mL (03-06 @ 10:54)      Echo:  3/6/19  1. Decreased systolic function with a visually estimated left ventricular   ejection fraction of 25-30%.  2. Multiple regional wall motion abnormalities as described above.  3. LV diastolic dysfunction with evidence of elevated filling pressures.  4. Moderate posterior mitral annular calcification. Mild mitral valve   regurgitation.  5. Calcified trileaflet aortic valve. Moderate to severe aortic stenosis   and mild aortic valve insufficiency.  6. the right ventricle is grossly normal in size and systolic function.  7. Normal tricuspid valve with mild tricuspid valve regurgitation with   incomplete waveform.  8. Normal pulmonic valve with trace insufficiency.  9. Limited study to estimate pulmonary artery systolic pressure        Cath:  3/13/19  RHC /LHC MID LAD with IFR .6 80% occlusion, 70% to CX IFR .89  PCWP 19 LVEDP 40 CI 2.42 RV 42/8    Interpretation of Telemetry:  sinus rhythm, HR 60s-80s

## 2019-03-13 NOTE — PROGRESS NOTE ADULT - NSICDXPROBLEM_GEN_ALL_CORE_FT
PROBLEM DIAGNOSES  Problem: Acute respiratory failure with hypoxia  Assessment and Plan: Problem: Acute respiratory failure with hypoxia.  Plan: 2/2 ADHF 2/2 flu A +  - 2/2 pulmonary edema, asthma exacerbation, and influenza A.   - currently off of any O2 and satting well, holding further lasix doses at this time given FLORIDALMA  - Management as above for HF and asthma exacerbation  - Duonebs Q6H PRN wheeze.     Problem: Prophylactic measure  Assessment and Plan: Plan; VTE ppx: HSQ  Diet: DASH, CC    Problem: Hypertension  Assessment and Plan: Hypertension.  Plan: - continue BB, hold ARB     Problem: Influenza A  Assessment and Plan: Problem: Influenza A. Plan: RVP positive - RESOLVED  - Tamiflu 5-day course  - cough symptom control.    Problem: Diabetes mellitus  Assessment and Plan: Problem: Type 2 diabetes mellitus with hypoglycemia without coma, with long-term current use of insulin.  Plan: A1c 6.8.   - continue with Lantus 10u QHS  - moderate ISS  - FSG w/t meals and QHS.     Problem: FLORIDALMA (acute kidney injury)  Assessment and Plan: Problem: FLORIDALMA (acute kidney injury).  Plan: Baseline 1.06-1.21 per PCP office - RESOLVED  - Urine lytes with Fe Urea of 30% consistent with prerenal disease, discontinued lasix last night with improvement in creatinine    - Continue to monitor, daily BMP   - strict I/Os, avoid nephrotoxins, renally dose meds  - continue to hold home oxybutin as can cause retention.    Problem: Asthma exacerbation  Assessment and Plan:  Problem/Plan - 8:  ·  Problem: Asthma exacerbation.  Plan: 2/2 influenza A infection  - supplemental oxygen as needed to maintain sat >92%  - c/w 5-day prednisone to 20mg daily, EOT 3/10 (started at OSH)  - Duoneb Q6H PRN wheeze.     Problem: Acute decompensated heart failure  Assessment and Plan:  Acute systolic heart failure.  Plan: 2/2 NSTEMI, mod-severe AS, and asthma exacerbation, required BIPAP at OSH, now on RA   - Unreliable I/O due to failed female urine condom, so daily standing weights.     Problem: NSTEMI (non-ST elevated myocardial infarction)  Assessment and Plan: NSTEMI (non-ST elevated myocardial infarction) s/p LHC w/t 70% mLAD and 80%  Plan:  - Cards recs:  -Plan for staged PCI pending diuresis due to elevated filling pressures  - 40mg IV lasix  - c/w ASA, plavix,   - c/w metoprolol succinate 100 daily  - c/w statin  - Hold ARB 2/2 FLORIDALMA and plan for cath  - Trend BMP, keep K>4, Mg>2  - Continue to monitor on telemetry.       R/O PROBLEM DIAGNOSES  Problem: Vulvovaginal candidiasis  Assessment and Plan: PROBLEM DIAGNOSES  Problem: Prophylactic measure  Assessment and Plan: Plan; VTE ppx: HSQ  Diet: DASH, CC    Problem: Acute respiratory failure with hypoxia  Assessment and Plan: Problem: Acute respiratory failure with hypoxia.  Plan: 2/2 ADHF 2/2 flu A +  - 2/2 pulmonary edema, asthma exacerbation, and influenza A.   - currently off of any O2 and satting well, holding further lasix doses at this time given FLORIDALMA  - Management as above for HF and asthma exacerbation  - Duonebs Q6H PRN wheeze.     Problem: Hypertension  Assessment and Plan: Hypertension.  Plan: - continue BB,   -hold ARB 2/2 floridalma and plan for staged pci     Problem: Diabetes mellitus  Assessment and Plan: Problem: Type 2 diabetes mellitus with hypoglycemia without coma, with long-term current use of insulin.  Plan: A1c 6.8.   - increase Lantus 18u QHS  - increase premeal lispro 4u QAC TID  - moderate ISS  - FSG w/t meals and QHS.     Problem: FLORIDALMA (acute kidney injury)  Assessment and Plan: Problem: FLORIDALMA (acute kidney injury).  Plan: Baseline 1.06-1.21 per PCP office   -Cr elevated s/p LHC, will trend    - daily BMP   - strict I/Os, avoid nephrotoxins, renally dose meds  - continue to hold home oxybutin as can cause retention.    Problem: Asthma exacerbation  Assessment and Plan:  Problem/Plan - 8:  ·  Problem: Asthma exacerbation.  Plan: 2/2 influenza A infection  - supplemental oxygen as needed to maintain sat >92%  - Duoneb Q6H PRN wheeze.     Problem: Acute decompensated heart failure  Assessment and Plan:  Acute systolic heart failure.  Plan: 2/2 NSTEMI, mod-severe AS, and asthma exacerbation, required BIPAP at OSH, now on RA   - 40mg IV lasix QD  -Strict I/Os, daily weights (standing)  -metop succ 100mg QD    Problem: NSTEMI (non-ST elevated myocardial infarction)  Assessment and Plan: NSTEMI (non-ST elevated myocardial infarction) s/p LHC w/t 70% mLAD and 75% mLCx  Plan:  - Cards recs:  -Plan for staged PCI pending diuresis due to elevated filling pressures  - 40mg IV lasix  -strict I/Os, daily weights (no weight loss over past day)  - c/w ASA, plavix,   - c/w metoprolol succinate 100 daily  - c/w statin  - Hold ARB 2/2 FLORIDALMA and plan for cath  - Trend BMP, keep K>4, Mg>2  - Continue to monitor on telemetry.

## 2019-03-13 NOTE — PROGRESS NOTE ADULT - ASSESSMENT
73yoF w/ PMHx significant for HTN, HLD, CAD, DM2 (on Lantus, glimepiride), asthma, glaucoma, uterine cancer s/p RT and MAYNOR-BSO presents as transfer from [facility] for NSTEMI, FLORIDALMA, acute hypoxic respiratory failure 2/2 acute decompensated heart failure, asthma exacerbation, and influenza A, to get [facility] today as FLORIDALMA resolved. 73yoF w/ PMHx significant for HTN, HLD, CAD, DM2 (on Lantus, glimepiride), asthma, glaucoma, uterine cancer s/p RT and MAYNOR-BSO presents as transfer from [facility] for NSTEMI, LIZETTE, acute hypoxic respiratory failure 2/2 acute decompensated heart failure, asthma exacerbation, and influenza A (resolved) s/p LHC 3/12 revealing 70% mLAD & 75% m LCx, and elevated LVEDP so now on diuresis planning staged PCI after improved volume control and lizette resolution.

## 2019-03-14 LAB
ANION GAP SERPL CALC-SCNC: 15 MMOL/L — SIGNIFICANT CHANGE UP (ref 5–17)
APTT BLD: 35 SEC — SIGNIFICANT CHANGE UP (ref 27.5–36.3)
BUN SERPL-MCNC: 67 MG/DL — HIGH (ref 7–23)
CALCIUM SERPL-MCNC: 9.9 MG/DL — SIGNIFICANT CHANGE UP (ref 8.4–10.5)
CHLORIDE SERPL-SCNC: 97 MMOL/L — SIGNIFICANT CHANGE UP (ref 96–108)
CO2 SERPL-SCNC: 26 MMOL/L — SIGNIFICANT CHANGE UP (ref 22–31)
CREAT SERPL-MCNC: 1.75 MG/DL — HIGH (ref 0.5–1.3)
GLUCOSE SERPL-MCNC: 188 MG/DL — HIGH (ref 70–99)
HCT VFR BLD CALC: 39.6 % — SIGNIFICANT CHANGE UP (ref 34.5–45)
HGB BLD-MCNC: 12.6 G/DL — SIGNIFICANT CHANGE UP (ref 11.5–15.5)
INR BLD: 1.11 RATIO — SIGNIFICANT CHANGE UP (ref 0.88–1.16)
MAGNESIUM SERPL-MCNC: 2.4 MG/DL — SIGNIFICANT CHANGE UP (ref 1.6–2.6)
MCHC RBC-ENTMCNC: 29.2 PG — SIGNIFICANT CHANGE UP (ref 27–34)
MCHC RBC-ENTMCNC: 31.8 GM/DL — LOW (ref 32–36)
MCV RBC AUTO: 91.7 FL — SIGNIFICANT CHANGE UP (ref 80–100)
PHOSPHATE SERPL-MCNC: 5.2 MG/DL — HIGH (ref 2.5–4.5)
PLATELET # BLD AUTO: 344 K/UL — SIGNIFICANT CHANGE UP (ref 150–400)
POTASSIUM SERPL-MCNC: 4.6 MMOL/L — SIGNIFICANT CHANGE UP (ref 3.5–5.3)
POTASSIUM SERPL-SCNC: 4.6 MMOL/L — SIGNIFICANT CHANGE UP (ref 3.5–5.3)
PROTHROM AB SERPL-ACNC: 12.7 SEC — SIGNIFICANT CHANGE UP (ref 10–13.1)
RBC # BLD: 4.32 M/UL — SIGNIFICANT CHANGE UP (ref 3.8–5.2)
RBC # FLD: 13 % — SIGNIFICANT CHANGE UP (ref 10.3–14.5)
SODIUM SERPL-SCNC: 138 MMOL/L — SIGNIFICANT CHANGE UP (ref 135–145)
WBC # BLD: 13.39 K/UL — HIGH (ref 3.8–10.5)
WBC # FLD AUTO: 13.39 K/UL — HIGH (ref 3.8–10.5)

## 2019-03-14 PROCEDURE — 99233 SBSQ HOSP IP/OBS HIGH 50: CPT | Mod: GC

## 2019-03-14 RX ORDER — INSULIN GLARGINE 100 [IU]/ML
22 INJECTION, SOLUTION SUBCUTANEOUS AT BEDTIME
Qty: 0 | Refills: 0 | Status: DISCONTINUED | OUTPATIENT
Start: 2019-03-14 | End: 2019-03-15

## 2019-03-14 RX ORDER — INSULIN LISPRO 100/ML
8 VIAL (ML) SUBCUTANEOUS
Qty: 0 | Refills: 0 | Status: DISCONTINUED | OUTPATIENT
Start: 2019-03-14 | End: 2019-03-15

## 2019-03-14 RX ADMIN — DORZOLAMIDE HYDROCHLORIDE 1 DROP(S): 20 SOLUTION/ DROPS OPHTHALMIC at 21:49

## 2019-03-14 RX ADMIN — Medication 8 UNIT(S): at 17:40

## 2019-03-14 RX ADMIN — NYSTATIN CREAM 1 APPLICATION(S): 100000 CREAM TOPICAL at 05:51

## 2019-03-14 RX ADMIN — Medication 2: at 17:39

## 2019-03-14 RX ADMIN — Medication 100 MILLIGRAM(S): at 15:50

## 2019-03-14 RX ADMIN — Medication 3 MILLILITER(S): at 17:39

## 2019-03-14 RX ADMIN — Medication 8 UNIT(S): at 07:41

## 2019-03-14 RX ADMIN — Medication 100 MILLIGRAM(S): at 23:42

## 2019-03-14 RX ADMIN — Medication 40 MILLIGRAM(S): at 05:51

## 2019-03-14 RX ADMIN — Medication 100 MILLIGRAM(S): at 15:49

## 2019-03-14 RX ADMIN — HEPARIN SODIUM 5000 UNIT(S): 5000 INJECTION INTRAVENOUS; SUBCUTANEOUS at 05:51

## 2019-03-14 RX ADMIN — Medication 100 MILLIGRAM(S): at 05:59

## 2019-03-14 RX ADMIN — BRIMONIDINE TARTRATE 1 DROP(S): 2 SOLUTION/ DROPS OPHTHALMIC at 05:50

## 2019-03-14 RX ADMIN — Medication 1 SPRAY(S): at 17:40

## 2019-03-14 RX ADMIN — DORZOLAMIDE HYDROCHLORIDE 1 DROP(S): 20 SOLUTION/ DROPS OPHTHALMIC at 05:50

## 2019-03-14 RX ADMIN — Medication 100 MILLIGRAM(S): at 21:47

## 2019-03-14 RX ADMIN — Medication 3 MILLILITER(S): at 05:49

## 2019-03-14 RX ADMIN — Medication 6: at 12:33

## 2019-03-14 RX ADMIN — Medication 81 MILLIGRAM(S): at 12:33

## 2019-03-14 RX ADMIN — Medication 100 MILLIGRAM(S): at 07:41

## 2019-03-14 RX ADMIN — BRIMONIDINE TARTRATE 1 DROP(S): 2 SOLUTION/ DROPS OPHTHALMIC at 21:48

## 2019-03-14 RX ADMIN — Medication 3 MILLILITER(S): at 12:33

## 2019-03-14 RX ADMIN — ATORVASTATIN CALCIUM 40 MILLIGRAM(S): 80 TABLET, FILM COATED ORAL at 21:48

## 2019-03-14 RX ADMIN — Medication 4: at 07:40

## 2019-03-14 RX ADMIN — CLOPIDOGREL BISULFATE 75 MILLIGRAM(S): 75 TABLET, FILM COATED ORAL at 12:32

## 2019-03-14 RX ADMIN — Medication 40 MILLIGRAM(S): at 17:39

## 2019-03-14 RX ADMIN — LATANOPROST 1 DROP(S): 0.05 SOLUTION/ DROPS OPHTHALMIC; TOPICAL at 21:48

## 2019-03-14 RX ADMIN — NYSTATIN CREAM 1 APPLICATION(S): 100000 CREAM TOPICAL at 17:34

## 2019-03-14 RX ADMIN — Medication 100 MILLIGRAM(S): at 05:51

## 2019-03-14 RX ADMIN — HEPARIN SODIUM 5000 UNIT(S): 5000 INJECTION INTRAVENOUS; SUBCUTANEOUS at 17:40

## 2019-03-14 RX ADMIN — Medication 100 MILLIGRAM(S): at 21:48

## 2019-03-14 RX ADMIN — Medication 3 MILLILITER(S): at 23:41

## 2019-03-14 RX ADMIN — INSULIN GLARGINE 22 UNIT(S): 100 INJECTION, SOLUTION SUBCUTANEOUS at 21:47

## 2019-03-14 RX ADMIN — Medication 1 SPRAY(S): at 05:50

## 2019-03-14 RX ADMIN — Medication 8 UNIT(S): at 12:33

## 2019-03-14 NOTE — PROGRESS NOTE ADULT - SUBJECTIVE AND OBJECTIVE BOX
Patient seen and examined at bedside.    Overnight Events: SOB improved. Has urinated ~ 6 times.    Review Of Systems:   CONSTITUTIONAL: weakness improved, no diaphoresis and cough improving  EYES/ENT: No visual changes;  No dysphagia  NECK: No pain or stiffness  RESPIRATORY: cough, SOB improving, no wheezing, no hemoptysis  CARDIOVASCULAR: No chest pain or palpitations; No lower extremity edema  GASTROINTESTINAL: No abdominal or epigastric pain. No nausea, vomiting, or hematemesis; No diarrhea or constipation. No melena or hematochezia.  BACK: No back pain  GENITOURINARY: No dysuria, frequency or hematuria  NEUROLOGICAL: No numbness or weakness  SKIN: No itching, burning, rashes, or lesions              Current Meds:  ALBUTerol/ipratropium for Nebulization 3 milliLiter(s) Nebulizer every 6 hours  aspirin enteric coated 81 milliGRAM(s) Oral daily  atorvastatin 40 milliGRAM(s) Oral at bedtime  benzonatate 100 milliGRAM(s) Oral three times a day PRN  brimonidine 0.2% Ophthalmic Solution 1 Drop(s) Both EYES two times a day  clopidogrel Tablet 75 milliGRAM(s) Oral daily  dextrose 40% Gel 15 Gram(s) Oral once PRN  dextrose 5%. 1000 milliLiter(s) IV Continuous <Continuous>  dextrose 50% Injectable 12.5 Gram(s) IV Push once  dextrose 50% Injectable 25 Gram(s) IV Push once  dextrose 50% Injectable 25 Gram(s) IV Push once  dorzolamide 2% Ophthalmic Solution 1 Drop(s) Both EYES three times a day  fluticasone propionate 50 MICROgram(s)/spray Nasal Spray 1 Spray(s) Both Nostrils two times a day  furosemide   Injectable 40 milliGRAM(s) IV Push every 12 hours  glucagon  Injectable 1 milliGRAM(s) IntraMuscular once PRN  guaiFENesin   Syrup  (Sugar-Free) 100 milliGRAM(s) Oral every 6 hours PRN  heparin  Injectable 5000 Unit(s) SubCutaneous every 12 hours  influenza   Vaccine 0.5 milliLiter(s) IntraMuscular once  insulin glargine Injectable (LANTUS) 18 Unit(s) SubCutaneous at bedtime  insulin lispro (HumaLOG) corrective regimen sliding scale   SubCutaneous three times a day before meals  insulin lispro (HumaLOG) corrective regimen sliding scale   SubCutaneous at bedtime  insulin lispro Injectable (HumaLOG) 8 Unit(s) SubCutaneous three times a day before meals  latanoprost 0.005% Ophthalmic Solution 1 Drop(s) Both EYES at bedtime  metoprolol succinate  milliGRAM(s) Oral daily  nystatin Powder 1 Application(s) Topical two times a day      Vitals:  T(F): 97.9 (03-14), Max: 97.9 (03-14)  HR: 86 (03-14) (84 - 93)  BP: 117/72 (03-14) (117/72 - 131/77)  RR: 18 (03-14)  SpO2: 94% (03-14)  I&O's Summary    13 Mar 2019 07:01  -  14 Mar 2019 07:00  --------------------------------------------------------  IN: 1080 mL / OUT: 700 mL / NET: 380 mL        Physical Exam:  Appearance: No acute distress; well appearing  HEENT:  EOMI, sclera anicteric, Normal oral mucosa  Cardiovascular: RRR, S1, S2, no murmurs, rubs, or gallops; no edema; no JVD, 1+ edema b/l  Respiratory: bibasilar crackles  Gastrointestinal: soft, non-tender, non-distended with normal bowel sounds  Musculoskeletal: No clubbing; no joint deformity   Neurologic: Non-focal  Lymphatic: No lymphadenopathy  Psychiatry: AAOx3, mood & affect appropriate  Skin: No rashes, ecchymoses, or cyanosis                               13.0   11.86 )-----------( 329      ( 13 Mar 2019 08:42 )             41.2     03-14    138  |  97  |  67<H>  ----------------------------<  188<H>  4.6   |  26  |  1.75<H>    Ca    9.9      14 Mar 2019 06:35  Phos  5.2     03-14  Mg     2.4     03-14      PT/INR - ( 13 Mar 2019 08:21 )   PT: 12.4 sec;   INR: 1.08 ratio         PTT - ( 13 Mar 2019 08:21 )  PTT:35.2 sec  CARDIAC MARKERS ( 09 Mar 2019 11:45 )  377 ng/L / x     / x     / 422 U/L / x     / 3.2 ng/mL  CARDIAC MARKERS ( 07 Mar 2019 17:07 )  x     / 3.950 ng/mL / x     / 863 U/L / x     / 5.6 ng/mL        Echo:  3/6/19  1. Decreased systolic function with a visually estimated left ventricular   ejection fraction of 25-30%.  2. Multiple regional wall motion abnormalities as described above.  3. LV diastolic dysfunction with evidence of elevated filling pressures.  4. Moderate posterior mitral annular calcification. Mild mitral valve   regurgitation.  5. Calcified trileaflet aortic valve. Moderate to severe aortic stenosis   and mild aortic valve insufficiency.  6. the right ventricle is grossly normal in size and systolic function.  7. Normal tricuspid valve with mild tricuspid valve regurgitation with   incomplete waveform.  8. Normal pulmonic valve with trace insufficiency.  9. Limited study to estimate pulmonary artery systolic pressure        Cath:  3/13/19  RHC /LHC MID LAD with IFR .6 80% occlusion, 70% to CX IFR .89  PCWP 19 LVEDP 40 CI 2.42 RV 42/8    Interpretation of Telemetry:  sinus rhythm, HR 60s-80s Patient seen and examined at bedside.    Overnight Events: SOB improved. Has urinated ~ 6 times.    Review Of Systems:   CONSTITUTIONAL: weakness improved, no diaphoresis and cough improving  EYES/ENT: No visual changes;  No dysphagia  NECK: No pain or stiffness  RESPIRATORY: cough, SOB improving, no wheezing, no hemoptysis  CARDIOVASCULAR: No chest pain or palpitations; No lower extremity edema  GASTROINTESTINAL: No abdominal or epigastric pain. No nausea, vomiting, or hematemesis; No diarrhea or constipation. No melena or hematochezia.  BACK: No back pain  GENITOURINARY: No dysuria, frequency or hematuria  NEUROLOGICAL: No numbness or weakness  SKIN: No itching, burning, rashes, or lesions              Current Meds:  ALBUTerol/ipratropium for Nebulization 3 milliLiter(s) Nebulizer every 6 hours  aspirin enteric coated 81 milliGRAM(s) Oral daily  atorvastatin 40 milliGRAM(s) Oral at bedtime  benzonatate 100 milliGRAM(s) Oral three times a day PRN  brimonidine 0.2% Ophthalmic Solution 1 Drop(s) Both EYES two times a day  clopidogrel Tablet 75 milliGRAM(s) Oral daily  dextrose 40% Gel 15 Gram(s) Oral once PRN  dextrose 5%. 1000 milliLiter(s) IV Continuous <Continuous>  dextrose 50% Injectable 12.5 Gram(s) IV Push once  dextrose 50% Injectable 25 Gram(s) IV Push once  dextrose 50% Injectable 25 Gram(s) IV Push once  dorzolamide 2% Ophthalmic Solution 1 Drop(s) Both EYES three times a day  fluticasone propionate 50 MICROgram(s)/spray Nasal Spray 1 Spray(s) Both Nostrils two times a day  furosemide   Injectable 40 milliGRAM(s) IV Push every 12 hours  glucagon  Injectable 1 milliGRAM(s) IntraMuscular once PRN  guaiFENesin   Syrup  (Sugar-Free) 100 milliGRAM(s) Oral every 6 hours PRN  heparin  Injectable 5000 Unit(s) SubCutaneous every 12 hours  influenza   Vaccine 0.5 milliLiter(s) IntraMuscular once  insulin glargine Injectable (LANTUS) 18 Unit(s) SubCutaneous at bedtime  insulin lispro (HumaLOG) corrective regimen sliding scale   SubCutaneous three times a day before meals  insulin lispro (HumaLOG) corrective regimen sliding scale   SubCutaneous at bedtime  insulin lispro Injectable (HumaLOG) 8 Unit(s) SubCutaneous three times a day before meals  latanoprost 0.005% Ophthalmic Solution 1 Drop(s) Both EYES at bedtime  metoprolol succinate  milliGRAM(s) Oral daily  nystatin Powder 1 Application(s) Topical two times a day      Vitals:  T(F): 97.9 (03-14), Max: 97.9 (03-14)  HR: 86 (03-14) (84 - 93)  BP: 117/72 (03-14) (117/72 - 131/77)  RR: 18 (03-14)  SpO2: 94% (03-14)  I&O's Summary    13 Mar 2019 07:01  -  14 Mar 2019 07:00  --------------------------------------------------------  IN: 1080 mL / OUT: 700 mL / NET: 380 mL        Physical Exam:  Appearance: No acute distress; well appearing  HEENT:  EOMI, sclera anicteric, Normal oral mucosa  Cardiovascular: RRR, S1, S2, no murmurs, rubs, or gallops; no edema; no JVD, 1+ edema b/l  Respiratory: decreased breath sounds R base  Gastrointestinal: soft, non-tender, non-distended with normal bowel sounds  Musculoskeletal: No clubbing; no joint deformity   Neurologic: Non-focal  Lymphatic: No lymphadenopathy  Psychiatry: AAOx3, mood & affect appropriate  Skin: No rashes, ecchymoses, or cyanosis                               13.0   11.86 )-----------( 329      ( 13 Mar 2019 08:42 )             41.2     03-14    138  |  97  |  67<H>  ----------------------------<  188<H>  4.6   |  26  |  1.75<H>    Ca    9.9      14 Mar 2019 06:35  Phos  5.2     03-14  Mg     2.4     03-14      PT/INR - ( 13 Mar 2019 08:21 )   PT: 12.4 sec;   INR: 1.08 ratio         PTT - ( 13 Mar 2019 08:21 )  PTT:35.2 sec  CARDIAC MARKERS ( 09 Mar 2019 11:45 )  377 ng/L / x     / x     / 422 U/L / x     / 3.2 ng/mL  CARDIAC MARKERS ( 07 Mar 2019 17:07 )  x     / 3.950 ng/mL / x     / 863 U/L / x     / 5.6 ng/mL        Echo:  3/6/19  1. Decreased systolic function with a visually estimated left ventricular   ejection fraction of 25-30%.  2. Multiple regional wall motion abnormalities as described above.  3. LV diastolic dysfunction with evidence of elevated filling pressures.  4. Moderate posterior mitral annular calcification. Mild mitral valve   regurgitation.  5. Calcified trileaflet aortic valve. Moderate to severe aortic stenosis   and mild aortic valve insufficiency.  6. the right ventricle is grossly normal in size and systolic function.  7. Normal tricuspid valve with mild tricuspid valve regurgitation with   incomplete waveform.  8. Normal pulmonic valve with trace insufficiency.  9. Limited study to estimate pulmonary artery systolic pressure        Cath:  3/13/19  RHC /LHC MID LAD with IFR .6 80% occlusion, 70% to CX IFR .89  PCWP 19 LVEDP 40 CI 2.42 RV 42/8    Interpretation of Telemetry:  sinus rhythm, HR 60s-80s

## 2019-03-14 NOTE — PROGRESS NOTE ADULT - NSICDXPROBLEM_GEN_ALL_CORE_FT
PROBLEM DIAGNOSES  Problem: Prophylactic measure  Assessment and Plan: Plan; VTE ppx: HSQ  Diet: DASH, CC    Problem: Acute respiratory failure with hypoxia  Assessment and Plan: Problem: Acute respiratory failure with hypoxia.  Plan: 2/2 ADHF 2/2 flu A +  - 2/2 pulmonary edema, asthma exacerbation, and influenza A.   - currently off of any O2 and satting well, holding further lasix doses at this time given FLORIDALMA  - Management as above for HF and asthma exacerbation  - Duonebs Q6H PRN wheeze.     Problem: Hypertension  Assessment and Plan: Hypertension.  Plan: - continue BB,   -hold ARB 2/2 floridalma and plan for staged pci     Problem: Diabetes mellitus  Assessment and Plan: Problem: Type 2 diabetes mellitus with hypoglycemia without coma, with long-term current use of insulin. Needed 8u additional yesterday  Plan: A1c 6.8.   - increase Lantus 18u QHS  - increase premeal lispro 8u QAC TID  - moderate ISS  - FSG w/t meals and QHS.     Problem: FLORIDALMA (acute kidney injury)  Assessment and Plan: Problem: FLORIDALMA (acute kidney injury).  Plan: Baseline 1.06-1.21 per PCP office   -Cr elevated s/p LHC, will trend    - daily BMP   - strict I/Os, avoid nephrotoxins, renally dose meds  - continue to hold home oxybutin as can cause retention.    Problem: Asthma exacerbation  Assessment and Plan:  Problem/Plan - 8:  ·  Problem: Asthma exacerbation.  Plan: 2/2 influenza A infection  - supplemental oxygen as needed to maintain sat >92%  - Duoneb Q6H PRN wheeze.     Problem: Acute decompensated heart failure  Assessment and Plan:  Acute systolic heart failure.  Plan: 2/2 NSTEMI, mod-severe AS, and asthma exacerbation, required BIPAP at OSH, now on RA   - 40mg IV lasix QD  -Strict I/Os, daily weights (standing)  -metop succ 100mg QD    Problem: NSTEMI (non-ST elevated myocardial infarction)  Assessment and Plan: NSTEMI (non-ST elevated myocardial infarction) s/p LHC w/t 70% mLAD and 75% mLCx  Plan:  - Cards recs:  -Plan for staged PCI pending diuresis due to elevated filling pressures  - 40mg IV lasix  -strict I/Os, daily weights (no weight loss over past day)  - c/w ASA, plavix,   - c/w metoprolol succinate 100 daily  - c/w statin  - Hold ARB 2/2 FLORIDALMA and plan for cath  - Trend BMP, keep K>4, Mg>2  - Continue to monitor on telemetry. PROBLEM DIAGNOSES  Problem: Prophylactic measure  Assessment and Plan: Plan; VTE ppx: HSQ  Diet: DASH, CC    Problem: Acute respiratory failure with hypoxia  Assessment and Plan: Problem: Acute respiratory failure with hypoxia.  Plan: 2/2 ADHF 2/2 flu A + c/b AS (LVEDP 40)  - 2/2 pulmonary edema, asthma exacerbation, and influenza A.   - currently off of any O2 and satting well  - Management as above for HF and asthma exacerbation  - Duonebs Q6H PRN wheeze.     Problem: Hypertension  Assessment and Plan: Hypertension.  Plan: - continue BB,   -hold ARB 2/2 floridalma and plan for staged pci     Problem: Vulvovaginal candidiasis  Assessment and Plan: Improving   Plan: Vaginal erythema and pruritis likely 2/2 Vulvovaginitis - IMPROVED  - c/w nystatin powder overnight   - Likely irritant dermatitis from urine.    Problem: Diabetes mellitus  Assessment and Plan: Problem: Type 2 diabetes mellitus with hypoglycemia without coma, with long-term current use of insulin. Needed 8u additional yesterday  Plan: A1c 6.8.   - increase Lantus 22u QHS  - increase premeal lispro 8u QAC TID  - moderate ISS  - FSG w/t meals and QHS.     Problem: FLORIDALMA (acute kidney injury)  Assessment and Plan: Problem: FLORIDALMA (acute kidney injury).  Plan: Baseline 1.06-1.21 per PCP office   -Cr elevated s/p LHC, will trend    - daily BMP   - strict I/Os, avoid nephrotoxins, renally dose meds  - continue to hold home oxybutin as can cause retention.    Problem: Asthma exacerbation  Assessment and Plan:  Problem/Plan - 8:  ·  Problem: Asthma exacerbation.  Plan: 2/2 influenza A infection  - supplemental oxygen as needed to maintain sat >92%  - Duoneb Q6H PRN wheeze.     Problem: Acute decompensated heart failure  Assessment and Plan:  Acute systolic heart failure.  Plan: 2/2 NSTEMI, mod-severe AS, and asthma exacerbation, required BIPAP at OSH, now on RA   - 40mg IV lasix QD  -Strict I/Os, daily weights (standing)  -metop succ 100mg QD    Problem: NSTEMI (non-ST elevated myocardial infarction)  Assessment and Plan: NSTEMI (non-ST elevated myocardial infarction) s/p LHC w/t 70% mLAD and 75% mLCx  Plan:  - Cards recs:  -Plan for staged PCI pending diuresis due to elevated filling pressures  - 40mg IV BID lasix  -strict I/Os, daily weights (no weight loss over past day)  - c/w ASA, plavix,   - c/w metoprolol succinate 100 daily  - c/w statin  - Hold ARB 2/2 FLORIDALMA and plan for cath  - Trend BMP, keep K>4, Mg>2  - Continue to monitor on telemetry. PROBLEM DIAGNOSES  Problem: Prophylactic measure  Assessment and Plan: Plan; VTE ppx: HSQ  Diet: DASH, CC    Problem: Acute respiratory failure with hypoxia  Assessment and Plan: Problem: Acute respiratory failure with hypoxia.  Plan: 2/2 ADHF 2/2 flu A + c/b AS (LVEDP 40)  - 2/2 pulmonary edema, asthma exacerbation, and influenza A.   - currently off of any O2 and satting well  - Management as above for HF and asthma exacerbation  - Duonebs Q6H PRN wheeze.     Problem: Hypertension  Assessment and Plan: Hypertension.  Plan: - continue BB,   -hold ARB 2/2 floridalma and plan for staged pci     Problem: Vulvovaginal candidiasis  Assessment and Plan: Improving   Plan: Vaginal erythema and pruritis likely 2/2 Vulvovaginitis - IMPROVED  - c/w nystatin powder overnight   - Likely irritant dermatitis from urine.    Problem: Diabetes mellitus  Assessment and Plan: Problem: Type 2 diabetes mellitus with hypoglycemia without coma, with long-term current use of insulin. Needed 8u additional yesterday  Plan: A1c 6.8.   - increase Lantus 22u QHS  - increase premeal lispro 8u QAC TID  - moderate ISS  - FSG w/t meals and QHS.     Problem: FLORIDALMA (acute kidney injury)  Assessment and Plan: Problem: FLORIDALMA (acute kidney injury).  Plan: Baseline 1.06-1.21 per PCP office   -Cr elevated s/p LHC, will trend    - daily BMP   - strict I/Os, avoid nephrotoxins, renally dose meds  - continue to hold home oxybutin as can cause retention.    Problem: Asthma exacerbation  Assessment and Plan:  Problem/Plan - 8:  ·  Problem: Asthma exacerbation.  Plan: 2/2 influenza A infection  - supplemental oxygen as needed to maintain sat >92%  - Duoneb Q6H PRN wheeze.     Problem: Acute decompensated heart failure  Assessment and Plan:  Acute systolic heart failure.  Plan: 2/2 NSTEMI, mod-severe AS, and asthma exacerbation, required BIPAP at OSH, now on RA   - 40mg IV lasix bid  -Strict I/Os, daily weights (standing)  -metop succ 100mg QD    Problem: NSTEMI (non-ST elevated myocardial infarction)  Assessment and Plan: NSTEMI (non-ST elevated myocardial infarction) s/p LHC w/t 70% mLAD and 75% mLCx  Plan:  - Cards recs:  -Plan for staged PCI pending diuresis due to elevated filling pressures  - 40mg IV BID lasix  -strict I/Os, daily weights (no weight loss over past day)  - c/w ASA, plavix,   - c/w metoprolol succinate 100 daily  - c/w statin  - Hold ARB 2/2 FLORIDALMA and plan for cath  - Trend BMP, keep K>4, Mg>2  - Continue to monitor on telemetry.

## 2019-03-14 NOTE — PROGRESS NOTE ADULT - ATTENDING COMMENTS
continue with lasix 40mg iv bid  lizette noted  anticipate to oral lasix in am  then eventual plan for [facility]  appreciate cardio recs

## 2019-03-14 NOTE — PROGRESS NOTE ADULT - ASSESSMENT
A/P: 73 year old F pt with PMH of HTN, HLD, CAD, DM2, and uterine cancer s/p MAYNOR-BSO presented as a transfer from [place] for NSTEMI in the setting of influenza A.  Found to have severe segmental LV dysfunction at OSH.  Currently feeling better from a FLU standpoint (off isolation).  Euvolemic, chest pain free.     #CAD  LHC on 3/12 with mLAD 80%, LCx 70%(both significant by iFR.) No intervention performed due to elevated LVEDP of 40  - cont dapt, atorvastatin 40 mg daily, metop succinate 100mg daily  - continue lasix 40mg IV BID  - unable to obtain strict Is/Os, will need daily weights  - diueresing prior to staged procedure for intervention on significant coronary lesions    #HFrEF  EF 25-30%. LHC as above  - continue metop succinate as above  - diuresis as above    Amrit Duckworth MD  Cardiology fellow  [id number]

## 2019-03-14 NOTE — PROGRESS NOTE ADULT - SUBJECTIVE AND OBJECTIVE BOX
Dr. Ren Fierro   Internal Medicine PGY-1  Pager #[phone]    Patient is a 73y old  Female who presents with a chief complaint of NSTEMI, acute hypoxic respiratory failure, acute decompensated heart failure, FLORIDALMA, influenza A (13 Mar 2019 06:26)      SUBJECTIVE / OVERNIGHT EVENTS:  DIAN ON. . /24hr on 40mg lasix bid. No CP, SOB, MORTON, palpitations.     MEDICATIONS  (STANDING):  ALBUTerol/ipratropium for Nebulization 3 milliLiter(s) Nebulizer every 6 hours  aspirin enteric coated 81 milliGRAM(s) Oral daily  atorvastatin 40 milliGRAM(s) Oral at bedtime  brimonidine 0.2% Ophthalmic Solution 1 Drop(s) Both EYES two times a day  clopidogrel Tablet 75 milliGRAM(s) Oral daily  dextrose 5%. 1000 milliLiter(s) (50 mL/Hr) IV Continuous <Continuous>  dextrose 50% Injectable 12.5 Gram(s) IV Push once  dextrose 50% Injectable 25 Gram(s) IV Push once  dextrose 50% Injectable 25 Gram(s) IV Push once  dorzolamide 2% Ophthalmic Solution 1 Drop(s) Both EYES three times a day  fluticasone propionate 50 MICROgram(s)/spray Nasal Spray 1 Spray(s) Both Nostrils two times a day  furosemide   Injectable 40 milliGRAM(s) IV Push every 12 hours  heparin  Injectable 5000 Unit(s) SubCutaneous every 12 hours  influenza   Vaccine 0.5 milliLiter(s) IntraMuscular once  insulin glargine Injectable (LANTUS) 18 Unit(s) SubCutaneous at bedtime  insulin lispro (HumaLOG) corrective regimen sliding scale   SubCutaneous three times a day before meals  insulin lispro (HumaLOG) corrective regimen sliding scale   SubCutaneous at bedtime  insulin lispro Injectable (HumaLOG) 4 Unit(s) SubCutaneous three times a day before meals  latanoprost 0.005% Ophthalmic Solution 1 Drop(s) Both EYES at bedtime  metoprolol succinate  milliGRAM(s) Oral daily  nystatin Powder 1 Application(s) Topical two times a day    MEDICATIONS  (PRN):  benzonatate 100 milliGRAM(s) Oral three times a day PRN Cough  dextrose 40% Gel 15 Gram(s) Oral once PRN Blood Glucose LESS THAN 70 milliGRAM(s)/deciliter  glucagon  Injectable 1 milliGRAM(s) IntraMuscular once PRN Glucose LESS THAN 70 milligrams/deciliter  guaiFENesin   Syrup  (Sugar-Free) 100 milliGRAM(s) Oral every 6 hours PRN Cough      Vital Signs Last 24 Hrs  T(C): 36.6 (14 Mar 2019 04:18), Max: 36.6 (13 Mar 2019 20:44)  T(F): 97.9 (14 Mar 2019 04:18), Max: 97.9 (14 Mar 2019 04:18)  HR: 86 (14 Mar 2019 04:18) (84 - 93)  BP: 117/72 (14 Mar 2019 04:18) (117/72 - 131/77)  BP(mean): --  RR: 18 (14 Mar 2019 04:18) (16 - 18)  SpO2: 94% (14 Mar 2019 04:18) (94% - 98%)  CAPILLARY BLOOD GLUCOSE      POCT Blood Glucose.: 243 mg/dL (13 Mar 2019 21:29)  POCT Blood Glucose.: 192 mg/dL (13 Mar 2019 17:14)  POCT Blood Glucose.: 250 mg/dL (13 Mar 2019 12:16)  POCT Blood Glucose.: 176 mg/dL (13 Mar 2019 07:35)    I&O's Summary    12 Mar 2019 07:01  -  13 Mar 2019 07:00  --------------------------------------------------------  IN: 660 mL / OUT: 150 mL / NET: 510 mL    13 Mar 2019 07:01  -  14 Mar 2019 06:15  --------------------------------------------------------  IN: 1080 mL / OUT: 700 mL / NET: 380 mL      PHYSICAL EXAM  GENERAL: NAD, well-developed, found sitting comfortably in bed  HEAD:  Atraumatic, Normocephalic  EYES: EOMI, PERRLA, conjunctiva and sclera clear  NECK: Supple, No JVD  CHEST/LUNG: Expiratory wheeze   HEART: Regular rate and rhythm; No murmur  ABDOMEN: Soft, Nontender, Nondistended; Bowel sounds present  EXTREMITIES:  No LE edema   PSYCH: AAOx3  NEUROLOGY: non-focal  SKIN: Erythema under stomach folds of skin above the pelvis. Improved erythema of the vulva.     LABS:                        13.0   11.86 )-----------( 329      ( 13 Mar 2019 08:42 )             41.2     03-13    140  |  101  |  50<H>  ----------------------------<  161<H>  4.5   |  25  |  1.36<H>    Ca    9.6      13 Mar 2019 05:30  Phos  4.6     03-13  Mg     2.5     03-13      PT/INR - ( 13 Mar 2019 08:21 )   PT: 12.4 sec;   INR: 1.08 ratio         PTT - ( 13 Mar 2019 08:21 )  PTT:35.2 sec          RADIOLOGY & ADDITIONAL TESTS:    Imaging Personally Reviewed:    Consultant(s) Notes Reviewed:      Care Discussed with Consultants/Other Providers: Dr. Ren Fierro   Internal Medicine PGY-1  Pager #[phone]    Patient is a 73y old  Female who presents with a chief complaint of NSTEMI, acute hypoxic respiratory failure, acute decompensated heart failure, FLORIDALMA, influenza A (13 Mar 2019 06:26)      SUBJECTIVE / OVERNIGHT EVENTS:  DIAN ON. . /24hr on 40mg lasix bid. No CP, SOB, MORTON, palpitations. Patient notes that some urine was lost to the bed during transfer from [place].     MEDICATIONS  (STANDING):  ALBUTerol/ipratropium for Nebulization 3 milliLiter(s) Nebulizer every 6 hours  aspirin enteric coated 81 milliGRAM(s) Oral daily  atorvastatin 40 milliGRAM(s) Oral at bedtime  brimonidine 0.2% Ophthalmic Solution 1 Drop(s) Both EYES two times a day  clopidogrel Tablet 75 milliGRAM(s) Oral daily  dextrose 5%. 1000 milliLiter(s) (50 mL/Hr) IV Continuous <Continuous>  dextrose 50% Injectable 12.5 Gram(s) IV Push once  dextrose 50% Injectable 25 Gram(s) IV Push once  dextrose 50% Injectable 25 Gram(s) IV Push once  dorzolamide 2% Ophthalmic Solution 1 Drop(s) Both EYES three times a day  fluticasone propionate 50 MICROgram(s)/spray Nasal Spray 1 Spray(s) Both Nostrils two times a day  furosemide   Injectable 40 milliGRAM(s) IV Push every 12 hours  heparin  Injectable 5000 Unit(s) SubCutaneous every 12 hours  influenza   Vaccine 0.5 milliLiter(s) IntraMuscular once  insulin glargine Injectable (LANTUS) 18 Unit(s) SubCutaneous at bedtime  insulin lispro (HumaLOG) corrective regimen sliding scale   SubCutaneous three times a day before meals  insulin lispro (HumaLOG) corrective regimen sliding scale   SubCutaneous at bedtime  insulin lispro Injectable (HumaLOG) 4 Unit(s) SubCutaneous three times a day before meals  latanoprost 0.005% Ophthalmic Solution 1 Drop(s) Both EYES at bedtime  metoprolol succinate  milliGRAM(s) Oral daily  nystatin Powder 1 Application(s) Topical two times a day    MEDICATIONS  (PRN):  benzonatate 100 milliGRAM(s) Oral three times a day PRN Cough  dextrose 40% Gel 15 Gram(s) Oral once PRN Blood Glucose LESS THAN 70 milliGRAM(s)/deciliter  glucagon  Injectable 1 milliGRAM(s) IntraMuscular once PRN Glucose LESS THAN 70 milligrams/deciliter  guaiFENesin   Syrup  (Sugar-Free) 100 milliGRAM(s) Oral every 6 hours PRN Cough      Vital Signs Last 24 Hrs  T(C): 36.6 (14 Mar 2019 04:18), Max: 36.6 (13 Mar 2019 20:44)  T(F): 97.9 (14 Mar 2019 04:18), Max: 97.9 (14 Mar 2019 04:18)  HR: 86 (14 Mar 2019 04:18) (84 - 93)  BP: 117/72 (14 Mar 2019 04:18) (117/72 - 131/77)  BP(mean): --  RR: 18 (14 Mar 2019 04:18) (16 - 18)  SpO2: 94% (14 Mar 2019 04:18) (94% - 98%)  CAPILLARY BLOOD GLUCOSE      POCT Blood Glucose.: 243 mg/dL (13 Mar 2019 21:29)  POCT Blood Glucose.: 192 mg/dL (13 Mar 2019 17:14)  POCT Blood Glucose.: 250 mg/dL (13 Mar 2019 12:16)  POCT Blood Glucose.: 176 mg/dL (13 Mar 2019 07:35)    I&O's Summary    12 Mar 2019 07:01  -  13 Mar 2019 07:00  --------------------------------------------------------  IN: 660 mL / OUT: 150 mL / NET: 510 mL    13 Mar 2019 07:01  -  14 Mar 2019 06:15  --------------------------------------------------------  IN: 1080 mL / OUT: 700 mL / NET: 380 mL      PHYSICAL EXAM  GENERAL: NAD, well-developed, found sitting comfortably in bed  HEAD:  Atraumatic, Normocephalic  EYES: EOMI, PERRLA, conjunctiva and sclera clear  NECK: Supple, No JVD  CHEST/LUNG: Clear  HEART: Regular rate and rhythm; No murmur  ABDOMEN: Soft, Nontender, Nondistended; Bowel sounds present  EXTREMITIES:  No LE edema   PSYCH: AAOx3  NEUROLOGY: non-focal  SKIN: Erythema under stomach folds of skin above the pelvis. Improved erythema of the vulva.     LABS:                             13.0   11.86 )-----------( 329      ( 13 Mar 2019 08:42 )             41.2       03-14    138  |  97  |  67<H>  ----------------------------<  188<H>  4.6   |  26  |  1.75<H>    Ca    9.9      14 Mar 2019 06:35  Phos  5.2     03-14  Mg     2.4     03-14                    PT/INR - ( 13 Mar 2019 08:21 )   PT: 12.4 sec;   INR: 1.08 ratio         PTT - ( 13 Mar 2019 08:21 )  PTT:35.2 sec    Lactate Trend            CAPILLARY BLOOD GLUCOSE      POCT Blood Glucose.: 204 mg/dL (14 Mar 2019 07:16)        Culture Results:   No growth at 5 days. (03-06 @ 16:38)  Culture Results:   No growth at 5 days. (03-06 @ 16:38)            RADIOLOGY & ADDITIONAL TESTS:    Imaging Personally Reviewed:    Consultant(s) Notes Reviewed:      Care Discussed with Consultants/Other Providers:

## 2019-03-14 NOTE — PROGRESS NOTE ADULT - ATTENDING COMMENTS
73 year old woman with severe LV dysfunction, presented with respiratory distress, chest heaviness and flu positive. Troponin elevated, EKG LBBB. Transferred for coronary angiography, deferred pending resolution of FLORIDALMA. Creatinine declined down to baseline, procedure revealed two lesions that contribute to ischemia based in IFR. However LVEDP markedly elevated. Thus need to diurese few days to optimize then return to cath lab. On furosemide 40 mg BID. As anticipated has rise in BUN and creatinine. Seek to continue diuresis one more day, she observes breathing more comfortable.

## 2019-03-14 NOTE — PROGRESS NOTE ADULT - ASSESSMENT
73yoF w/ PMHx significant for HTN, HLD, CAD, DM2 (on Lantus, glimepiride), asthma, glaucoma, uterine cancer s/p RT and MAYNOR-BSO presents as transfer from [facility] for NSTEMI, LIZETTE, acute hypoxic respiratory failure 2/2 acute decompensated heart failure, asthma exacerbation, and influenza A (resolved) s/p LHC 3/12 revealing 70% mLAD & 75% m LCx, and elevated LVEDP so now on diuresis planning staged PCI after improved volume control and lizette resolution. UOP less than expected on 40mg IV bid, will monitor closely (? lost urine counts). 73yoF w/ PMHx significant for HTN, HLD, CAD, DM2 (on Lantus, glimepiride), asthma, glaucoma, uterine cancer s/p RT and MAYNOR-BSO presents as transfer from [facility] for NSTEMI, LIZETTE, acute hypoxic respiratory failure 2/2 acute decompensated heart failure, asthma exacerbation, and influenza A (resolved) s/p LHC 3/12 revealing 70% mLAD & 75% m LCx, and elevated LVEDP so now on diuresis planning staged PCI after improved volume control and lizette resolution. UOP less than expected on 40mg IV bid, will monitor closely (lost some urine). LIZETTE worse, 1.75 now s/p LHC and diuresis.

## 2019-03-15 LAB
ANION GAP SERPL CALC-SCNC: 16 MMOL/L — SIGNIFICANT CHANGE UP (ref 5–17)
APTT BLD: 35.8 SEC — SIGNIFICANT CHANGE UP (ref 27.5–36.3)
BUN SERPL-MCNC: 72 MG/DL — HIGH (ref 7–23)
CALCIUM SERPL-MCNC: 9.4 MG/DL — SIGNIFICANT CHANGE UP (ref 8.4–10.5)
CHLORIDE SERPL-SCNC: 96 MMOL/L — SIGNIFICANT CHANGE UP (ref 96–108)
CO2 SERPL-SCNC: 24 MMOL/L — SIGNIFICANT CHANGE UP (ref 22–31)
CREAT SERPL-MCNC: 1.73 MG/DL — HIGH (ref 0.5–1.3)
GLUCOSE SERPL-MCNC: 169 MG/DL — HIGH (ref 70–99)
HCT VFR BLD CALC: 37.9 % — SIGNIFICANT CHANGE UP (ref 34.5–45)
HGB BLD-MCNC: 12.3 G/DL — SIGNIFICANT CHANGE UP (ref 11.5–15.5)
INR BLD: 1.12 RATIO — SIGNIFICANT CHANGE UP (ref 0.88–1.16)
MAGNESIUM SERPL-MCNC: 2.4 MG/DL — SIGNIFICANT CHANGE UP (ref 1.6–2.6)
MCHC RBC-ENTMCNC: 29.5 PG — SIGNIFICANT CHANGE UP (ref 27–34)
MCHC RBC-ENTMCNC: 32.5 GM/DL — SIGNIFICANT CHANGE UP (ref 32–36)
MCV RBC AUTO: 90.9 FL — SIGNIFICANT CHANGE UP (ref 80–100)
PHOSPHATE SERPL-MCNC: 4.6 MG/DL — HIGH (ref 2.5–4.5)
PLATELET # BLD AUTO: 352 K/UL — SIGNIFICANT CHANGE UP (ref 150–400)
POTASSIUM SERPL-MCNC: 4.2 MMOL/L — SIGNIFICANT CHANGE UP (ref 3.5–5.3)
POTASSIUM SERPL-SCNC: 4.2 MMOL/L — SIGNIFICANT CHANGE UP (ref 3.5–5.3)
PROTHROM AB SERPL-ACNC: 12.7 SEC — SIGNIFICANT CHANGE UP (ref 10–13.1)
RBC # BLD: 4.17 M/UL — SIGNIFICANT CHANGE UP (ref 3.8–5.2)
RBC # FLD: 13 % — SIGNIFICANT CHANGE UP (ref 10.3–14.5)
SODIUM SERPL-SCNC: 136 MMOL/L — SIGNIFICANT CHANGE UP (ref 135–145)
WBC # BLD: 13.8 K/UL — HIGH (ref 3.8–10.5)
WBC # FLD AUTO: 13.8 K/UL — HIGH (ref 3.8–10.5)

## 2019-03-15 PROCEDURE — 99233 SBSQ HOSP IP/OBS HIGH 50: CPT | Mod: GC

## 2019-03-15 PROCEDURE — 76937 US GUIDE VASCULAR ACCESS: CPT | Mod: 26,GC

## 2019-03-15 PROCEDURE — 99152 MOD SED SAME PHYS/QHP 5/>YRS: CPT | Mod: GC

## 2019-03-15 PROCEDURE — 93010 ELECTROCARDIOGRAM REPORT: CPT

## 2019-03-15 PROCEDURE — 92928 PRQ TCAT PLMT NTRAC ST 1 LES: CPT | Mod: LC,GC

## 2019-03-15 RX ORDER — INSULIN LISPRO 100/ML
14 VIAL (ML) SUBCUTANEOUS
Qty: 0 | Refills: 0 | Status: DISCONTINUED | OUTPATIENT
Start: 2019-03-15 | End: 2019-03-19

## 2019-03-15 RX ORDER — INSULIN GLARGINE 100 [IU]/ML
28 INJECTION, SOLUTION SUBCUTANEOUS AT BEDTIME
Qty: 0 | Refills: 0 | Status: DISCONTINUED | OUTPATIENT
Start: 2019-03-15 | End: 2019-03-19

## 2019-03-15 RX ADMIN — Medication 40 MILLIGRAM(S): at 05:47

## 2019-03-15 RX ADMIN — Medication 4: at 07:55

## 2019-03-15 RX ADMIN — Medication 100 MILLIGRAM(S): at 05:47

## 2019-03-15 RX ADMIN — Medication 3 MILLILITER(S): at 11:26

## 2019-03-15 RX ADMIN — Medication 3 MILLILITER(S): at 22:50

## 2019-03-15 RX ADMIN — Medication 1 SPRAY(S): at 05:46

## 2019-03-15 RX ADMIN — CLOPIDOGREL BISULFATE 75 MILLIGRAM(S): 75 TABLET, FILM COATED ORAL at 11:27

## 2019-03-15 RX ADMIN — BRIMONIDINE TARTRATE 1 DROP(S): 2 SOLUTION/ DROPS OPHTHALMIC at 22:25

## 2019-03-15 RX ADMIN — BRIMONIDINE TARTRATE 1 DROP(S): 2 SOLUTION/ DROPS OPHTHALMIC at 05:46

## 2019-03-15 RX ADMIN — Medication 100 MILLIGRAM(S): at 14:51

## 2019-03-15 RX ADMIN — HEPARIN SODIUM 5000 UNIT(S): 5000 INJECTION INTRAVENOUS; SUBCUTANEOUS at 05:47

## 2019-03-15 RX ADMIN — Medication 3 MILLILITER(S): at 05:48

## 2019-03-15 RX ADMIN — NYSTATIN CREAM 1 APPLICATION(S): 100000 CREAM TOPICAL at 05:47

## 2019-03-15 RX ADMIN — Medication 100 MILLIGRAM(S): at 05:46

## 2019-03-15 RX ADMIN — Medication 2: at 11:56

## 2019-03-15 RX ADMIN — INSULIN GLARGINE 28 UNIT(S): 100 INJECTION, SOLUTION SUBCUTANEOUS at 22:23

## 2019-03-15 RX ADMIN — Medication 14 UNIT(S): at 11:56

## 2019-03-15 RX ADMIN — ATORVASTATIN CALCIUM 40 MILLIGRAM(S): 80 TABLET, FILM COATED ORAL at 22:23

## 2019-03-15 RX ADMIN — Medication 100 MILLIGRAM(S): at 22:50

## 2019-03-15 RX ADMIN — Medication 14 UNIT(S): at 07:55

## 2019-03-15 RX ADMIN — Medication 81 MILLIGRAM(S): at 11:27

## 2019-03-15 RX ADMIN — Medication 100 MILLIGRAM(S): at 05:45

## 2019-03-15 RX ADMIN — DORZOLAMIDE HYDROCHLORIDE 1 DROP(S): 20 SOLUTION/ DROPS OPHTHALMIC at 05:47

## 2019-03-15 RX ADMIN — LATANOPROST 1 DROP(S): 0.05 SOLUTION/ DROPS OPHTHALMIC; TOPICAL at 22:25

## 2019-03-15 NOTE — PROGRESS NOTE ADULT - ASSESSMENT
A/P: 73 year old F pt with PMH of HTN, HLD, CAD, DM2, and uterine cancer s/p MAYNOR-BSO presented as a transfer from [place] for NSTEMI in the setting of influenza A.  Found to have severe segmental LV dysfunction at OSH.  Currently feeling better from a FLU standpoint (off isolation).  Euvolemic, chest pain free.     #CAD  LHC on 3/12 with mLAD 80%, LCx 70%(both significant by iFR.) No intervention performed due to elevated LVEDP of 40  - cont dapt, atorvastatin 40 mg daily, metop succinate 100mg daily  - creatinine remains stable, continue lasix 40mg IV BID  - unable to obtain strict Is/Os given incontinence, will need daily weights  - will discuss possibility of performing LHC today    #HFrEF  EF 25-30%. LHC as above  - continue metop succinate as above  - diuresis as above    Amrit Duckworth MD  Cardiology fellow  [id number]

## 2019-03-15 NOTE — PROGRESS NOTE ADULT - ASSESSMENT
73yoF w/ PMHx significant for HTN, HLD, CAD, DM2 (on Lantus, glimepiride), asthma, glaucoma, uterine cancer s/p RT and MAYNOR-BSO presents as transfer from [facility] for NSTEMI, LIZETTE, acute hypoxic respiratory failure 2/2 acute decompensated heart failure, asthma exacerbation, and influenza A (resolved) s/p LHC 3/12 revealing 70% mLAD & 75% m LCx, and elevated LVEDP so now on diuresis planning staged PCI after improved volume control and lizette resolution. UOP less than expected on 40mg IV bid, will monitor closely (lost some urine). LIZETTE worse, 1.75 now s/p LHC and diuresis.  Last day of diuresis today. 73yoF w/ PMHx significant for HTN, HLD, CAD, DM2 (on Lantus, glimepiride), asthma, glaucoma, uterine cancer s/p RT and MAYNOR-BSO presents as transfer from [facility] for NSTEMI, LIZETTE, acute hypoxic respiratory failure 2/2 acute decompensated heart failure, asthma exacerbation, and influenza A (resolved) s/p LHC 3/12 revealing 70% mLAD & 75% m LCx, and elevated LVEDP so now on diuresis planning staged PCI after improved volume control and lizette resolution.

## 2019-03-15 NOTE — PROGRESS NOTE ADULT - ATTENDING COMMENTS
73 year old woman with severe LV dysfunction, presented with respiratory distress, chest heaviness and flu positive. Troponin elevated, EKG LBBB. Transferred for coronary angiography, deferred pending resolution of FLORIDALMA. Creatinine declined down to baseline, procedure revealed two lesions that contribute to ischemia based in IFR. However LVEDP markedly elevated as was pulmonary capillary wedge pressures - on review of wave forms had marked respiratory variation, but expiratory mean PCW 26.. Have diuresed few days to optimize and renal function stable yesterday and today with creatinine 1.7. Plan return to cath lab today.

## 2019-03-15 NOTE — PROGRESS NOTE ADULT - SUBJECTIVE AND OBJECTIVE BOX
Dr. Ren Fierro   Internal Medicine PGY-1  Pager #[phone]    Patient is a 73y old  Female who presents with a chief complaint of NSTEMI, acute hypoxic respiratory failure, acute decompensated heart failure, FLORIDALMA, influenza A (14 Mar 2019 07:53)      SUBJECTIVE / OVERNIGHT EVENTS:  DIAN ON. Last day of diuresis today. No SOB, CP, or abdominal pain.     MEDICATIONS  (STANDING):  ALBUTerol/ipratropium for Nebulization 3 milliLiter(s) Nebulizer every 6 hours  aspirin enteric coated 81 milliGRAM(s) Oral daily  atorvastatin 40 milliGRAM(s) Oral at bedtime  brimonidine 0.2% Ophthalmic Solution 1 Drop(s) Both EYES two times a day  clopidogrel Tablet 75 milliGRAM(s) Oral daily  dextrose 5%. 1000 milliLiter(s) (50 mL/Hr) IV Continuous <Continuous>  dextrose 50% Injectable 12.5 Gram(s) IV Push once  dextrose 50% Injectable 25 Gram(s) IV Push once  dextrose 50% Injectable 25 Gram(s) IV Push once  dorzolamide 2% Ophthalmic Solution 1 Drop(s) Both EYES three times a day  fluticasone propionate 50 MICROgram(s)/spray Nasal Spray 1 Spray(s) Both Nostrils two times a day  furosemide   Injectable 40 milliGRAM(s) IV Push every 12 hours  heparin  Injectable 5000 Unit(s) SubCutaneous every 12 hours  influenza   Vaccine 0.5 milliLiter(s) IntraMuscular once  insulin glargine Injectable (LANTUS) 22 Unit(s) SubCutaneous at bedtime  insulin lispro (HumaLOG) corrective regimen sliding scale   SubCutaneous three times a day before meals  insulin lispro (HumaLOG) corrective regimen sliding scale   SubCutaneous at bedtime  insulin lispro Injectable (HumaLOG) 8 Unit(s) SubCutaneous three times a day before meals  latanoprost 0.005% Ophthalmic Solution 1 Drop(s) Both EYES at bedtime  metoprolol succinate  milliGRAM(s) Oral daily  nystatin Powder 1 Application(s) Topical two times a day    MEDICATIONS  (PRN):  benzonatate 100 milliGRAM(s) Oral three times a day PRN Cough  dextrose 40% Gel 15 Gram(s) Oral once PRN Blood Glucose LESS THAN 70 milliGRAM(s)/deciliter  glucagon  Injectable 1 milliGRAM(s) IntraMuscular once PRN Glucose LESS THAN 70 milligrams/deciliter  guaiFENesin   Syrup  (Sugar-Free) 100 milliGRAM(s) Oral every 6 hours PRN Cough      Vital Signs Last 24 Hrs  T(C): 36.9 (15 Mar 2019 05:01), Max: 37.1 (14 Mar 2019 21:39)  T(F): 98.4 (15 Mar 2019 05:01), Max: 98.8 (14 Mar 2019 21:39)  HR: 95 (15 Mar 2019 05:01) (92 - 95)  BP: 110/61 (15 Mar 2019 05:01) (110/61 - 130/78)  BP(mean): --  RR: 18 (15 Mar 2019 05:01) (16 - 18)  SpO2: 95% (15 Mar 2019 05:01) (94% - 95%)  CAPILLARY BLOOD GLUCOSE      POCT Blood Glucose.: 207 mg/dL (14 Mar 2019 21:22)  POCT Blood Glucose.: 170 mg/dL (14 Mar 2019 17:07)  POCT Blood Glucose.: 257 mg/dL (14 Mar 2019 12:06)  POCT Blood Glucose.: 204 mg/dL (14 Mar 2019 07:16)    I&O's Summary    13 Mar 2019 07:01  -  14 Mar 2019 07:00  --------------------------------------------------------  IN: 1080 mL / OUT: 750 mL / NET: 330 mL    14 Mar 2019 07:01  -  15 Mar 2019 06:28  --------------------------------------------------------  IN: 1380 mL / OUT: 950 mL / NET: 430 mL      PHYSICAL EXAM  GENERAL: NAD, well-developed, found sitting comfortably in bed  HEAD:  Atraumatic, Normocephalic  EYES: EOMI, PERRLA, conjunctiva and sclera clear  NECK: Supple, No JVD  CHEST/LUNG: Clear  HEART: Regular rate and rhythm; No murmur  ABDOMEN: Soft, Nontender, Nondistended; Bowel sounds present  EXTREMITIES:  No LE edema   PSYCH: AAOx3  NEUROLOGY: non-focal  SKIN: Erythema under stomach folds of skin above the pelvis. Improved erythema of the vulva.     LABS:                        12.6   13.39 )-----------( 344      ( 14 Mar 2019 09:47 )             39.6     03-14    138  |  97  |  67<H>  ----------------------------<  188<H>  4.6   |  26  |  1.75<H>    Ca    9.9      14 Mar 2019 06:35  Phos  5.2     03-14  Mg     2.4     03-14      PT/INR - ( 14 Mar 2019 08:03 )   PT: 12.7 sec;   INR: 1.11 ratio         PTT - ( 14 Mar 2019 08:03 )  PTT:35.0 sec          RADIOLOGY & ADDITIONAL TESTS:    Imaging Personally Reviewed:    Consultant(s) Notes Reviewed:      Care Discussed with Consultants/Other Providers: Dr. Ren Fierro   Internal Medicine PGY-1  Pager #[phone]    Patient is a 73y old  Female who presents with a chief complaint of NSTEMI, acute hypoxic respiratory failure, acute decompensated heart failure, FLORIDALMA, influenza A (14 Mar 2019 07:53)      SUBJECTIVE / OVERNIGHT EVENTS:  DIAN ON. Last day of diuresis today. No SOB, CP, or abdominal pain. Possible LHC today.     MEDICATIONS  (STANDING):  ALBUTerol/ipratropium for Nebulization 3 milliLiter(s) Nebulizer every 6 hours  aspirin enteric coated 81 milliGRAM(s) Oral daily  atorvastatin 40 milliGRAM(s) Oral at bedtime  brimonidine 0.2% Ophthalmic Solution 1 Drop(s) Both EYES two times a day  clopidogrel Tablet 75 milliGRAM(s) Oral daily  dextrose 5%. 1000 milliLiter(s) (50 mL/Hr) IV Continuous <Continuous>  dextrose 50% Injectable 12.5 Gram(s) IV Push once  dextrose 50% Injectable 25 Gram(s) IV Push once  dextrose 50% Injectable 25 Gram(s) IV Push once  dorzolamide 2% Ophthalmic Solution 1 Drop(s) Both EYES three times a day  fluticasone propionate 50 MICROgram(s)/spray Nasal Spray 1 Spray(s) Both Nostrils two times a day  furosemide   Injectable 40 milliGRAM(s) IV Push every 12 hours  heparin  Injectable 5000 Unit(s) SubCutaneous every 12 hours  influenza   Vaccine 0.5 milliLiter(s) IntraMuscular once  insulin glargine Injectable (LANTUS) 22 Unit(s) SubCutaneous at bedtime  insulin lispro (HumaLOG) corrective regimen sliding scale   SubCutaneous three times a day before meals  insulin lispro (HumaLOG) corrective regimen sliding scale   SubCutaneous at bedtime  insulin lispro Injectable (HumaLOG) 8 Unit(s) SubCutaneous three times a day before meals  latanoprost 0.005% Ophthalmic Solution 1 Drop(s) Both EYES at bedtime  metoprolol succinate  milliGRAM(s) Oral daily  nystatin Powder 1 Application(s) Topical two times a day    MEDICATIONS  (PRN):  benzonatate 100 milliGRAM(s) Oral three times a day PRN Cough  dextrose 40% Gel 15 Gram(s) Oral once PRN Blood Glucose LESS THAN 70 milliGRAM(s)/deciliter  glucagon  Injectable 1 milliGRAM(s) IntraMuscular once PRN Glucose LESS THAN 70 milligrams/deciliter  guaiFENesin   Syrup  (Sugar-Free) 100 milliGRAM(s) Oral every 6 hours PRN Cough      Vital Signs Last 24 Hrs  T(C): 36.9 (15 Mar 2019 05:01), Max: 37.1 (14 Mar 2019 21:39)  T(F): 98.4 (15 Mar 2019 05:01), Max: 98.8 (14 Mar 2019 21:39)  HR: 95 (15 Mar 2019 05:01) (92 - 95)  BP: 110/61 (15 Mar 2019 05:01) (110/61 - 130/78)  BP(mean): --  RR: 18 (15 Mar 2019 05:01) (16 - 18)  SpO2: 95% (15 Mar 2019 05:01) (94% - 95%)  CAPILLARY BLOOD GLUCOSE      POCT Blood Glucose.: 207 mg/dL (14 Mar 2019 21:22)  POCT Blood Glucose.: 170 mg/dL (14 Mar 2019 17:07)  POCT Blood Glucose.: 257 mg/dL (14 Mar 2019 12:06)  POCT Blood Glucose.: 204 mg/dL (14 Mar 2019 07:16)    I&O's Summary    13 Mar 2019 07:01  -  14 Mar 2019 07:00  --------------------------------------------------------  IN: 1080 mL / OUT: 750 mL / NET: 330 mL    14 Mar 2019 07:01  -  15 Mar 2019 06:28  --------------------------------------------------------  IN: 1380 mL / OUT: 950 mL / NET: 430 mL      PHYSICAL EXAM  GENERAL: NAD, well-developed, found sitting comfortably in bed  HEAD:  Atraumatic, Normocephalic  EYES: EOMI, PERRLA, conjunctiva and sclera clear  NECK: Supple, No JVD  CHEST/LUNG: Clear  HEART: Regular rate and rhythm; No murmur  ABDOMEN: Soft, Nontender, Nondistended; Bowel sounds present  EXTREMITIES:  No LE edema   PSYCH: AAOx3  NEUROLOGY: non-focal  SKIN: Erythema under stomach folds of skin above the pelvis. Improved erythema of the vulva.     LABS:                        12.6   13.39 )-----------( 344      ( 14 Mar 2019 09:47 )             39.6     03-14    138  |  97  |  67<H>  ----------------------------<  188<H>  4.6   |  26  |  1.75<H>    Ca    9.9      14 Mar 2019 06:35  Phos  5.2     03-14  Mg     2.4     03-14      PT/INR - ( 14 Mar 2019 08:03 )   PT: 12.7 sec;   INR: 1.11 ratio         PTT - ( 14 Mar 2019 08:03 )  PTT:35.0 sec          RADIOLOGY & ADDITIONAL TESTS:    Imaging Personally Reviewed:    Consultant(s) Notes Reviewed:      Care Discussed with Consultants/Other Providers:

## 2019-03-15 NOTE — PROGRESS NOTE ADULT - SUBJECTIVE AND OBJECTIVE BOX
Patient seen and examined at bedside.    Overnight Events: SOB resolved. Net pos 130 but with 2 episodes of incontinence.    Review Of Systems:   CONSTITUTIONAL: weakness improved, no diaphoresis and cough improving  EYES/ENT: No visual changes;  No dysphagia  NECK: No pain or stiffness  RESPIRATORY: cough, SOB improving, no wheezing, no hemoptysis  CARDIOVASCULAR: No chest pain or palpitations; No lower extremity edema  GASTROINTESTINAL: No abdominal or epigastric pain. No nausea, vomiting, or hematemesis; No diarrhea or constipation. No melena or hematochezia.  BACK: No back pain  GENITOURINARY: No dysuria, frequency or hematuria  NEUROLOGICAL: No numbness or weakness  SKIN: No itching, burning, rashes, or lesions              Current Meds:  ALBUTerol/ipratropium for Nebulization 3 milliLiter(s) Nebulizer every 6 hours  aspirin enteric coated 81 milliGRAM(s) Oral daily  atorvastatin 40 milliGRAM(s) Oral at bedtime  benzonatate 100 milliGRAM(s) Oral three times a day PRN  brimonidine 0.2% Ophthalmic Solution 1 Drop(s) Both EYES two times a day  clopidogrel Tablet 75 milliGRAM(s) Oral daily  dextrose 40% Gel 15 Gram(s) Oral once PRN  dextrose 5%. 1000 milliLiter(s) IV Continuous <Continuous>  dextrose 50% Injectable 12.5 Gram(s) IV Push once  dextrose 50% Injectable 25 Gram(s) IV Push once  dextrose 50% Injectable 25 Gram(s) IV Push once  dorzolamide 2% Ophthalmic Solution 1 Drop(s) Both EYES three times a day  fluticasone propionate 50 MICROgram(s)/spray Nasal Spray 1 Spray(s) Both Nostrils two times a day  furosemide   Injectable 40 milliGRAM(s) IV Push every 12 hours  glucagon  Injectable 1 milliGRAM(s) IntraMuscular once PRN  guaiFENesin   Syrup  (Sugar-Free) 100 milliGRAM(s) Oral every 6 hours PRN  heparin  Injectable 5000 Unit(s) SubCutaneous every 12 hours  influenza   Vaccine 0.5 milliLiter(s) IntraMuscular once  insulin glargine Injectable (LANTUS) 28 Unit(s) SubCutaneous at bedtime  insulin lispro (HumaLOG) corrective regimen sliding scale   SubCutaneous three times a day before meals  insulin lispro (HumaLOG) corrective regimen sliding scale   SubCutaneous at bedtime  insulin lispro Injectable (HumaLOG) 14 Unit(s) SubCutaneous three times a day before meals  latanoprost 0.005% Ophthalmic Solution 1 Drop(s) Both EYES at bedtime  metoprolol succinate  milliGRAM(s) Oral daily  nystatin Powder 1 Application(s) Topical two times a day      Vitals:  T(F): 98.4 (03-15), Max: 98.8 (03-14)  HR: 95 (03-15) (92 - 95)  BP: 110/61 (03-15) (110/61 - 130/78)  RR: 18 (03-15)  SpO2: 95% (03-15)  I&O's Summary    14 Mar 2019 07:01  -  15 Mar 2019 07:00  --------------------------------------------------------  IN: 1380 mL / OUT: 1250 mL / NET: 130 mL      Physical Exam:  Appearance: No acute distress; well appearing  HEENT:  EOMI, sclera anicteric, Normal oral mucosa  Cardiovascular: RRR, S1, S2, no murmurs, rubs, or gallops; no edema; no JVD, 1+ edema b/l  Respiratory: decreased breath sounds R base  Gastrointestinal: soft, non-tender, non-distended with normal bowel sounds  Musculoskeletal: No clubbing; no joint deformity   Neurologic: Non-focal  Lymphatic: No lymphadenopathy  Psychiatry: AAOx3, mood & affect appropriate  Skin: No rashes, ecchymoses, or cyanosis                                    12.6   13.39 )-----------( 344      ( 14 Mar 2019 09:47 )             39.6     03-15    136  |  96  |  72<H>  ----------------------------<  169<H>  4.2   |  24  |  1.73<H>    Ca    9.4      15 Mar 2019 06:17  Phos  4.6     03-15  Mg     2.4     03-15      PT/INR - ( 14 Mar 2019 08:03 )   PT: 12.7 sec;   INR: 1.11 ratio         PTT - ( 14 Mar 2019 08:03 )  PTT:35.0 sec  CARDIAC MARKERS ( 09 Mar 2019 11:45 )  377 ng/L / x     / x     / 422 U/L / x     / 3.2 ng/mL        Echo:  3/6/19  1. Decreased systolic function with a visually estimated left ventricular   ejection fraction of 25-30%.  2. Multiple regional wall motion abnormalities as described above.  3. LV diastolic dysfunction with evidence of elevated filling pressures.  4. Moderate posterior mitral annular calcification. Mild mitral valve   regurgitation.  5. Calcified trileaflet aortic valve. Moderate to severe aortic stenosis   and mild aortic valve insufficiency.  6. the right ventricle is grossly normal in size and systolic function.  7. Normal tricuspid valve with mild tricuspid valve regurgitation with   incomplete waveform.  8. Normal pulmonic valve with trace insufficiency.  9. Limited study to estimate pulmonary artery systolic pressure        Cath:  3/13/19  RHC /LHC MID LAD with IFR .6 80% occlusion, 70% to CX IFR .89  PCWP 19 LVEDP 40 CI 2.42 RV 42/8    Interpretation of Telemetry:  sinus rhythm 80s-90s

## 2019-03-15 NOTE — PROGRESS NOTE ADULT - ATTENDING COMMENTS
plan for [facility] today  continue diuresis for one more day, with anticipation to oral lasix tomorrow  monitor crt  agree with increasing lantus to 28u  monitor fs  continue to hold arb for now

## 2019-03-16 LAB
ANION GAP SERPL CALC-SCNC: 16 MMOL/L — SIGNIFICANT CHANGE UP (ref 5–17)
APTT BLD: 34.1 SEC — SIGNIFICANT CHANGE UP (ref 27.5–36.3)
BLD GP AB SCN SERPL QL: NEGATIVE — SIGNIFICANT CHANGE UP
BUN SERPL-MCNC: 72 MG/DL — HIGH (ref 7–23)
CALCIUM SERPL-MCNC: 9.4 MG/DL — SIGNIFICANT CHANGE UP (ref 8.4–10.5)
CHLORIDE SERPL-SCNC: 99 MMOL/L — SIGNIFICANT CHANGE UP (ref 96–108)
CO2 SERPL-SCNC: 23 MMOL/L — SIGNIFICANT CHANGE UP (ref 22–31)
CREAT SERPL-MCNC: 1.53 MG/DL — HIGH (ref 0.5–1.3)
GLUCOSE SERPL-MCNC: 154 MG/DL — HIGH (ref 70–99)
HCT VFR BLD CALC: 38.1 % — SIGNIFICANT CHANGE UP (ref 34.5–45)
HGB BLD-MCNC: 12 G/DL — SIGNIFICANT CHANGE UP (ref 11.5–15.5)
INR BLD: 1.11 RATIO — SIGNIFICANT CHANGE UP (ref 0.88–1.16)
MAGNESIUM SERPL-MCNC: 2.6 MG/DL — SIGNIFICANT CHANGE UP (ref 1.6–2.6)
MCHC RBC-ENTMCNC: 29.1 PG — SIGNIFICANT CHANGE UP (ref 27–34)
MCHC RBC-ENTMCNC: 31.5 GM/DL — LOW (ref 32–36)
MCV RBC AUTO: 92.5 FL — SIGNIFICANT CHANGE UP (ref 80–100)
PHOSPHATE SERPL-MCNC: 4.3 MG/DL — SIGNIFICANT CHANGE UP (ref 2.5–4.5)
PLATELET # BLD AUTO: 327 K/UL — SIGNIFICANT CHANGE UP (ref 150–400)
POTASSIUM SERPL-MCNC: 4.2 MMOL/L — SIGNIFICANT CHANGE UP (ref 3.5–5.3)
POTASSIUM SERPL-SCNC: 4.2 MMOL/L — SIGNIFICANT CHANGE UP (ref 3.5–5.3)
PROTHROM AB SERPL-ACNC: 12.7 SEC — SIGNIFICANT CHANGE UP (ref 10–13.1)
RBC # BLD: 4.12 M/UL — SIGNIFICANT CHANGE UP (ref 3.8–5.2)
RBC # FLD: 13.1 % — SIGNIFICANT CHANGE UP (ref 10.3–14.5)
RH IG SCN BLD-IMP: POSITIVE — SIGNIFICANT CHANGE UP
SODIUM SERPL-SCNC: 138 MMOL/L — SIGNIFICANT CHANGE UP (ref 135–145)
WBC # BLD: 10.3 K/UL — SIGNIFICANT CHANGE UP (ref 3.8–10.5)
WBC # FLD AUTO: 10.3 K/UL — SIGNIFICANT CHANGE UP (ref 3.8–10.5)

## 2019-03-16 PROCEDURE — 99233 SBSQ HOSP IP/OBS HIGH 50: CPT | Mod: GC

## 2019-03-16 PROCEDURE — 93010 ELECTROCARDIOGRAM REPORT: CPT | Mod: 76

## 2019-03-16 PROCEDURE — 99233 SBSQ HOSP IP/OBS HIGH 50: CPT

## 2019-03-16 RX ADMIN — Medication 3 MILLILITER(S): at 05:25

## 2019-03-16 RX ADMIN — Medication 40 MILLIGRAM(S): at 17:48

## 2019-03-16 RX ADMIN — Medication 100 MILLIGRAM(S): at 05:26

## 2019-03-16 RX ADMIN — Medication 40 MILLIGRAM(S): at 05:25

## 2019-03-16 RX ADMIN — Medication 3 MILLILITER(S): at 17:49

## 2019-03-16 RX ADMIN — Medication 100 MILLIGRAM(S): at 12:25

## 2019-03-16 RX ADMIN — BRIMONIDINE TARTRATE 1 DROP(S): 2 SOLUTION/ DROPS OPHTHALMIC at 05:26

## 2019-03-16 RX ADMIN — Medication 14 UNIT(S): at 17:47

## 2019-03-16 RX ADMIN — Medication 2: at 08:04

## 2019-03-16 RX ADMIN — Medication 81 MILLIGRAM(S): at 12:22

## 2019-03-16 RX ADMIN — HEPARIN SODIUM 5000 UNIT(S): 5000 INJECTION INTRAVENOUS; SUBCUTANEOUS at 05:26

## 2019-03-16 RX ADMIN — Medication 100 MILLIGRAM(S): at 17:55

## 2019-03-16 RX ADMIN — CLOPIDOGREL BISULFATE 75 MILLIGRAM(S): 75 TABLET, FILM COATED ORAL at 12:22

## 2019-03-16 RX ADMIN — NYSTATIN CREAM 1 APPLICATION(S): 100000 CREAM TOPICAL at 05:26

## 2019-03-16 RX ADMIN — Medication 100 MILLIGRAM(S): at 12:24

## 2019-03-16 RX ADMIN — Medication 14 UNIT(S): at 12:19

## 2019-03-16 RX ADMIN — NYSTATIN CREAM 1 APPLICATION(S): 100000 CREAM TOPICAL at 17:51

## 2019-03-16 RX ADMIN — INSULIN GLARGINE 28 UNIT(S): 100 INJECTION, SOLUTION SUBCUTANEOUS at 22:52

## 2019-03-16 RX ADMIN — ATORVASTATIN CALCIUM 40 MILLIGRAM(S): 80 TABLET, FILM COATED ORAL at 22:52

## 2019-03-16 RX ADMIN — DORZOLAMIDE HYDROCHLORIDE 1 DROP(S): 20 SOLUTION/ DROPS OPHTHALMIC at 05:26

## 2019-03-16 RX ADMIN — Medication 1 SPRAY(S): at 05:25

## 2019-03-16 RX ADMIN — Medication 3 MILLILITER(S): at 22:54

## 2019-03-16 RX ADMIN — Medication 100 MILLIGRAM(S): at 22:53

## 2019-03-16 RX ADMIN — LATANOPROST 1 DROP(S): 0.05 SOLUTION/ DROPS OPHTHALMIC; TOPICAL at 22:52

## 2019-03-16 RX ADMIN — Medication 4: at 12:20

## 2019-03-16 RX ADMIN — Medication 1 SPRAY(S): at 17:49

## 2019-03-16 RX ADMIN — Medication 3 MILLILITER(S): at 12:21

## 2019-03-16 RX ADMIN — DORZOLAMIDE HYDROCHLORIDE 1 DROP(S): 20 SOLUTION/ DROPS OPHTHALMIC at 22:52

## 2019-03-16 RX ADMIN — Medication 14 UNIT(S): at 08:05

## 2019-03-16 RX ADMIN — BRIMONIDINE TARTRATE 1 DROP(S): 2 SOLUTION/ DROPS OPHTHALMIC at 22:51

## 2019-03-16 RX ADMIN — HEPARIN SODIUM 5000 UNIT(S): 5000 INJECTION INTRAVENOUS; SUBCUTANEOUS at 17:49

## 2019-03-16 NOTE — PROGRESS NOTE ADULT - NSICDXPROBLEM_GEN_ALL_CORE_FT
PROBLEM DIAGNOSES  Problem: Prophylactic measure  Assessment and Plan: Plan; VTE ppx: HSQ  Diet: DASH, CC    Problem: Acute respiratory failure with hypoxia  Assessment and Plan: Problem: Acute respiratory failure with hypoxia.  Plan: 2/2 ADHF 2/2 flu A + c/b AS (LVEDP 40)  - 2/2 pulmonary edema, asthma exacerbation, and influenza A.   - currently off of any O2 and satting well  - Management as above for HF and asthma exacerbation  - Duonebs Q6H PRN wheeze.     Problem: Hypertension  Assessment and Plan: Hypertension.  Plan: - continue BB,   -hold ARB 2/2 floridalma and plan for staged pci     Problem: Vulvovaginal candidiasis  Assessment and Plan: Improving   Plan: Vaginal erythema and pruritis likely 2/2 Vulvovaginitis - IMPROVED  - c/w nystatin powder overnight   - Likely irritant dermatitis from urine.    Problem: Diabetes mellitus  Assessment and Plan: Problem: Type 2 diabetes mellitus with hypoglycemia without coma, with long-term current use of insulin. Needed 8u additional yesterday  Plan: A1c 6.8.   - increase Lantus 22u QHS  - increase premeal lispro 8u QAC TID  - moderate ISS  - FSG w/t meals and QHS.     Problem: FLORIDALMA (acute kidney injury)  Assessment and Plan: Problem: FLORIDALMA (acute kidney injury).  Plan: Baseline 1.06-1.21 per PCP office   -Cr elevated s/p LHC, will trend    - daily BMP   - strict I/Os, avoid nephrotoxins, renally dose meds  - continue to hold home oxybutin as can cause retention.    Problem: Asthma exacerbation  Assessment and Plan:  Problem/Plan - 8:  ·  Problem: Asthma exacerbation.  Plan: 2/2 influenza A infection  - supplemental oxygen as needed to maintain sat >92%  - Duoneb Q6H PRN wheeze.     Problem: Acute decompensated heart failure  Assessment and Plan:  Acute systolic heart failure.  Plan: 2/2 NSTEMI, mod-severe AS, and asthma exacerbation, required BIPAP at OSH, now on RA   - 40mg IV lasix bid (last day today)  -Strict I/Os, daily weights (standing)  -metop succ 100mg QD    Problem: NSTEMI (non-ST elevated myocardial infarction)  Assessment and Plan: NSTEMI (non-ST elevated myocardial infarction) s/p LHC w/t 70% mLAD and 75% mLCx now s/p paged PCI  Plan:  - Cards recs:  -Plan for staged PCI pending diuresis due to elevated filling pressures  - 40mg IV BID lasix (last dose today)  -strict I/Os, daily weights (no weight loss over past day)  - c/w ASA, plavix,   - c/w metoprolol succinate 100 daily  - c/w statin  - Hold ARB 2/2 FLORIDALMA and plan for cath  - Trend BMP, keep K>4, Mg>2  - Continue to monitor on telemetry. PROBLEM DIAGNOSES  Problem: NSTEMI (non-ST elevated myocardial infarction)  Assessment and Plan: NSTEMI (non-ST elevated myocardial infarction) s/p LHC w/t 70% mLAD and 75% mLCx, now s/p 1 TONY to LCx. Plan for staged PCI to LAD on Mon.  Plan:  - Cards recs:  - 40mg IV BID lasix  -strict I/Os, daily weights (standing)  - c/w ASA, plavix,   - c/w metoprolol succinate 100 daily  - c/w statin  - Hold ARB 2/2 FLORIDALMA and plan for cath  - Trend BMP, keep K>4, Mg>2  - Continue to monitor on telemetry.     Problem: Acute decompensated heart failure  Assessment and Plan:  Acute systolic heart failure.  Plan: 2/2 NSTEMI, mod-severe AS, and asthma exacerbation, required BIPAP at OSH, now on RA   NYHA 3, ACC/AHA C  - c/w 40mg IV lasix bid   -Strict I/Os, daily weights (standing)  -metop succ 100mg QD  -staged PCI to LAD Mon    Problem: Acute respiratory failure with hypoxia  Assessment and Plan: Problem: Acute respiratory failure with hypoxia.  Plan: 2/2 ADHF 2/2 flu A + c/b AS (LVEDP 40)  - 2/2 pulmonary edema, asthma exacerbation, and influenza A.   - currently off of any O2 and satting well  - Management as above for HF and asthma exacerbation  - Duonebs Q6H PRN wheeze.     Problem: Hypertension  Assessment and Plan: Hypertension.  Plan: - continue BB,   -hold ARB 2/2 floridalma and plan for staged pci     Problem: FLORIDALMA (acute kidney injury)  Assessment and Plan: Problem: FLORIDALMA (acute kidney injury).  Plan: Baseline 1.06-1.21 per PCP office   - daily BMP   - strict I/Os, avoid nephrotoxins, renally dose meds  - continue to hold home oxybutin as can cause retention.    Problem: Vulvovaginal candidiasis  Assessment and Plan: Improving   Plan: Vaginal erythema and pruritis likely 2/2 Vulvovaginitis - IMPROVED  - c/w nystatin powder overnight   - Likely irritant dermatitis from urine.    Problem: Diabetes mellitus  Assessment and Plan: Problem: Type 2 diabetes mellitus with hypoglycemia without coma, with long-term current use of insulin. Needed 8u additional yesterday  Plan: A1c 6.8.   - Lantus 28u QHS  - premeal lispro 14u QAC TID  - moderate ISS  - FSG w/t meals and QHS.     Problem: Asthma exacerbation  Assessment and Plan:  Problem/Plan - 8:  ·  Problem: Asthma exacerbation.  Plan: 2/2 influenza A infection  - supplemental oxygen as needed to maintain sat >92%  - Duoneb Q6H PRN wheeze.     Problem: Prophylactic measure  Assessment and Plan: Plan; VTE ppx: HSQ  Diet: DASH, CC

## 2019-03-16 NOTE — PROGRESS NOTE ADULT - ASSESSMENT
A/P: 73 year old F pt with PMH of HTN, HLD, CAD, DM2, and uterine cancer s/p MAYNOR-BSO presented as a transfer from [place] for NSTEMI in the setting of influenza A.  Found to have severe segmental LV dysfunction at OSH.  Currently feeling better from a FLU standpoint (off isolation).  Euvolemic, chest pain free.     #CAD  LHC on 3/12 with mLAD 80%, LCx 70%(both significant by iFR.) No intervention performed due to elevated LVEDP of 40  - cont dapt, atorvastatin 40 mg daily, metop succinate 100mg daily  - creatinine remains stable, continue lasix 40mg IV BID  - unable to obtain strict Is/Os given incontinence, will need daily weights  - will go for cath on monday (was supposed to go on friday but it was delayed)    #HFrEF- NYHA 3, ACC/AHA C-euvolemic  EF 25-30%. LHC as above  - continue metop succinate as above  - Appears euvolemic but volume exam is difficult. I/O are inaccurate due to incontinence. NO WEIGHT TODAY. Please check daily weights    James Allison  [id number] A/P: 73 year old F pt with PMH of HTN, HLD, CAD, DM2, and uterine cancer s/p MAYNOR-BSO presented as a transfer from [place] for NSTEMI in the setting of influenza A.  Found to have severe segmental LV dysfunction at OSH.  Currently feeling better from a FLU standpoint (off isolation).  Euvolemic, chest pain free.     #CAD  LHC on 3/12 with mLAD 80%, LCx 70%(both significant by iFR.) No intervention performed due to elevated LVEDP of 40  - cont dapt, atorvastatin 40 mg daily, metop succinate 100mg daily  - creatinine remains stable, continue lasix 40mg IV BID  - unable to obtain strict Is/Os given incontinence, will need daily weights  - will go for cath on monday (was supposed to go on friday but it was delayed)    #HFrEF- NYHA 3, ACC/AHA C-euvolemic  EF 25-30%. LHC as above  - continue metop succinate as above  - Appears euvolemic on exam.  I/O are inaccurate due to incontinence. NO WEIGHT TODAY. Please check daily weights    James Allison  [id number] A/P: 73 year old F pt with PMH of HTN, HLD, CAD, DM2, and uterine cancer s/p MAYNOR-BSO presented as a transfer from [place] for NSTEMI in the setting of influenza A.  Found to have severe segmental LV dysfunction at OSH.  Currently feeling better from a FLU standpoint (off isolation).  Euvolemic, chest pain free.     #CAD  LHC on 3/12 with mLAD 80%, LCx 70%(both significant by iFR.) No intervention performed due to elevated LVEDP of 40  - cont dapt, atorvastatin 40 mg daily, metop succinate 100mg daily  - creatinine remains stable, continue lasix 40mg IV BID  - unable to obtain strict Is/Os given incontinence, will need daily weights  - s/p PCI to LCx yesterday. Will go for staged PCI to LAD on monday    #HFrEF- NYHA 3, ACC/AHA C-euvolemic  EF 25-30%. LHC as above  - continue metop succinate as above  - Appears euvolemic on exam.  I/O are inaccurate due to incontinence. NO WEIGHT TODAY. Please check daily weights    James Allison  [id number]

## 2019-03-16 NOTE — CHART NOTE - NSCHARTNOTEFT_GEN_A_CORE
s/p PCI to CX (80%) via left groin. Site is benign with no hematoma/ bleeding. Patient denies leg pain/ chest pain. Vital signs stable. Staged PCI to LAD on Monday. Continue DAPT and statin.

## 2019-03-16 NOTE — PROGRESS NOTE ADULT - SUBJECTIVE AND OBJECTIVE BOX
24H hour events:   Feels well. Laying flat without significant orthopnea/PND. No chest pain.     MEDICATIONS:  aspirin enteric coated 81 milliGRAM(s) Oral daily  clopidogrel Tablet 75 milliGRAM(s) Oral daily  furosemide   Injectable 40 milliGRAM(s) IV Push every 12 hours  heparin  Injectable 5000 Unit(s) SubCutaneous every 12 hours  metoprolol succinate  milliGRAM(s) Oral daily  ALBUTerol/ipratropium for Nebulization 3 milliLiter(s) Nebulizer every 6 hours  benzonatate 100 milliGRAM(s) Oral three times a day PRN  guaiFENesin   Syrup  (Sugar-Free) 100 milliGRAM(s) Oral every 6 hours PRN  atorvastatin 40 milliGRAM(s) Oral at bedtime  dextrose 40% Gel 15 Gram(s) Oral once PRN  dextrose 50% Injectable 12.5 Gram(s) IV Push once  dextrose 50% Injectable 25 Gram(s) IV Push once  dextrose 50% Injectable 25 Gram(s) IV Push once  glucagon  Injectable 1 milliGRAM(s) IntraMuscular once PRN  insulin glargine Injectable (LANTUS) 28 Unit(s) SubCutaneous at bedtime  insulin lispro (HumaLOG) corrective regimen sliding scale   SubCutaneous three times a day before meals  insulin lispro (HumaLOG) corrective regimen sliding scale   SubCutaneous at bedtime  insulin lispro Injectable (HumaLOG) 14 Unit(s) SubCutaneous three times a day before meals  brimonidine 0.2% Ophthalmic Solution 1 Drop(s) Both EYES two times a day  dextrose 5%. 1000 milliLiter(s) IV Continuous <Continuous>  dorzolamide 2% Ophthalmic Solution 1 Drop(s) Both EYES three times a day  fluticasone propionate 50 MICROgram(s)/spray Nasal Spray 1 Spray(s) Both Nostrils two times a day  influenza   Vaccine 0.5 milliLiter(s) IntraMuscular once  latanoprost 0.005% Ophthalmic Solution 1 Drop(s) Both EYES at bedtime  nystatin Powder 1 Application(s) Topical two times a day        PHYSICAL EXAM:  T(C): 36.4 (03-16-19 @ 04:42), Max: 36.7 (03-15-19 @ 14:30)  HR: 89 (03-16-19 @ 09:40) (82 - 91)  BP: 126/78 (03-16-19 @ 09:40) (122/71 - 128/64)  RR: 18 (03-16-19 @ 04:42) (16 - 18)  SpO2: 96% (03-16-19 @ 04:42) (95% - 98%)  Wt(kg): --      I&O's Summary    15 Mar 2019 07:01  -  16 Mar 2019 07:00  --------------------------------------------------------  IN: 600 mL / OUT: 600 mL / NET: 0 mL        Physical Exam:  Appearance: No acute distress; well appearing  HEENT:  EOMI, sclera anicteric, Normal oral mucosa  Cardiovascular: RRR, S1, S2, 2/6  c/d murmer RUSB, rubs, or gallops; no edema; no JVD, 1+ edema b/l  Respiratory: decreased breath sounds R base  Gastrointestinal: soft, non-tender, non-distended with normal bowel sounds  Musculoskeletal: No clubbing; no joint deformity   Neurologic: Non-focal  Lymphatic: No lymphadenopathy  Psychiatry: AAOx3, mood & affect appropriate  Skin: No rashes, ecchymoses, or cyanosis      LABS:	 	    CBC Full  -  ( 15 Mar 2019 09:19 )  WBC Count : 13.80 K/uL  Hemoglobin : 12.3 g/dL  Hematocrit : 37.9 %  Platelet Count - Automated : 352 K/uL  Mean Cell Volume : 90.9 fl  Mean Cell Hemoglobin : 29.5 pg  Mean Cell Hemoglobin Concentration : 32.5 gm/dL  Auto Neutrophil # : x  Auto Lymphocyte # : x  Auto Monocyte # : x  Auto Eosinophil # : x  Auto Basophil # : x  Auto Neutrophil % : x  Auto Lymphocyte % : x  Auto Monocyte % : x  Auto Eosinophil % : x  Auto Basophil % : x    03-16    138  |  99  |  72<H>  ----------------------------<  154<H>  4.2   |  23  |  1.53<H>  03-15    136  |  96  |  72<H>  ----------------------------<  169<H>  4.2   |  24  |  1.73<H>    Ca    9.4      16 Mar 2019 07:01  Ca    9.4      15 Mar 2019 06:17  Phos  4.3     03-16  Phos  4.6     03-15  Mg     2.6     03-16  Mg     2.4     03-15      TELEMETRY: 	  NSR 70-90  	    PREVIOUS DIAGNOSTIC TESTING:    [ ] Echocardiogram:  < from: TTE Echo Doppler w/o Cont (03.06.19 @ 15:10) >  CONCLUSIONS:    1. Decreased systolic function with a visually estimated left ventricular   ejection fraction of 25-30%.  2. Multiple regional wall motion abnormalities as described above.  3. LV diastolic dysfunction with evidence of elevated filling pressures.  4. Moderate posterior mitral annular calcification. Mild mitral valve   regurgitation.  5. Calcified trileaflet aortic valve. Moderate to severe aortic stenosis   and mild aortic valve insufficiency.  6. the right ventricle is grossly normal in size and systolic function.  7. Normal tricuspid valve with mild tricuspid valve regurgitation with   incomplete waveform.  8. Normal pulmonic valve with trace insufficiency.  9. Limited study to estimate pulmonary artery systolic pressure    < end of copied text >    [ ]  Catheterization:  < from: Cardiac Cath Lab - Adult (03.12.19 @ 15:08) >  CORONARY VESSELS: The coronary circulation is right dominant.  LM:   --  LM: Normal.  LAD:   --  Mid LAD: There was a diffuse 70 % stenosis. iFR = 0.77  CX:   --  Mid circumflex: There was a tubular 75 % stenosis. IFR =0.89  --  OM1: Normal.  RCA:   --  Proximal RCA: There was a 20 % stenosis.  --  Distal RCA: There was a 30 % stenosis.  COMPLICATIONS: There were no complications.  DIAGNOSTIC IMPRESSIONS: Elevated LVEDP. IFR abn Lcx and LAD disease  DIAGNOSTIC RECOMMENDATIONS: IV diuresis and med optimization. If clinically  indicated, would pursue PCI of the LAD and Lcx.    < end of copied text >    [ ] Stress Test:      OTHER:  Lines:  tripple lumen R IJ 24H hour events:   Feels well. Laying flat without significant orthopnea/PND. No chest pain.     MEDICATIONS:  aspirin enteric coated 81 milliGRAM(s) Oral daily  clopidogrel Tablet 75 milliGRAM(s) Oral daily  furosemide   Injectable 40 milliGRAM(s) IV Push every 12 hours  heparin  Injectable 5000 Unit(s) SubCutaneous every 12 hours  metoprolol succinate  milliGRAM(s) Oral daily  ALBUTerol/ipratropium for Nebulization 3 milliLiter(s) Nebulizer every 6 hours  benzonatate 100 milliGRAM(s) Oral three times a day PRN  guaiFENesin   Syrup  (Sugar-Free) 100 milliGRAM(s) Oral every 6 hours PRN  atorvastatin 40 milliGRAM(s) Oral at bedtime  dextrose 40% Gel 15 Gram(s) Oral once PRN  dextrose 50% Injectable 12.5 Gram(s) IV Push once  dextrose 50% Injectable 25 Gram(s) IV Push once  dextrose 50% Injectable 25 Gram(s) IV Push once  glucagon  Injectable 1 milliGRAM(s) IntraMuscular once PRN  insulin glargine Injectable (LANTUS) 28 Unit(s) SubCutaneous at bedtime  insulin lispro (HumaLOG) corrective regimen sliding scale   SubCutaneous three times a day before meals  insulin lispro (HumaLOG) corrective regimen sliding scale   SubCutaneous at bedtime  insulin lispro Injectable (HumaLOG) 14 Unit(s) SubCutaneous three times a day before meals  brimonidine 0.2% Ophthalmic Solution 1 Drop(s) Both EYES two times a day  dextrose 5%. 1000 milliLiter(s) IV Continuous <Continuous>  dorzolamide 2% Ophthalmic Solution 1 Drop(s) Both EYES three times a day  fluticasone propionate 50 MICROgram(s)/spray Nasal Spray 1 Spray(s) Both Nostrils two times a day  influenza   Vaccine 0.5 milliLiter(s) IntraMuscular once  latanoprost 0.005% Ophthalmic Solution 1 Drop(s) Both EYES at bedtime  nystatin Powder 1 Application(s) Topical two times a day        PHYSICAL EXAM:  T(C): 36.4 (03-16-19 @ 04:42), Max: 36.7 (03-15-19 @ 14:30)  HR: 89 (03-16-19 @ 09:40) (82 - 91)  BP: 126/78 (03-16-19 @ 09:40) (122/71 - 128/64)  RR: 18 (03-16-19 @ 04:42) (16 - 18)  SpO2: 96% (03-16-19 @ 04:42) (95% - 98%)  Wt(kg): --      I&O's Summary    15 Mar 2019 07:01  -  16 Mar 2019 07:00  --------------------------------------------------------  IN: 600 mL / OUT: 600 mL / NET: 0 mL        Physical Exam:  Appearance: No acute distress; well appearing  HEENT:  EOMI, sclera anicteric, Normal oral mucosa  Cardiovascular: RRR, S1, S2, 2/6  c/d murmer RUSB, rubs, or gallops; no edema; no JVD, 1+ edema b/l  Respiratory: decreased breath sounds R base  Gastrointestinal: soft, non-tender, non-distended with normal bowel sounds  Musculoskeletal: No clubbing; no joint deformity   Neurologic: Non-focal  Lymphatic: No lymphadenopathy  Psychiatry: AAOx3, mood & affect appropriate  Skin: No rashes, ecchymoses, or cyanosis      LABS:	 	    CBC Full  -  ( 15 Mar 2019 09:19 )  WBC Count : 13.80 K/uL  Hemoglobin : 12.3 g/dL  Hematocrit : 37.9 %  Platelet Count - Automated : 352 K/uL  Mean Cell Volume : 90.9 fl  Mean Cell Hemoglobin : 29.5 pg  Mean Cell Hemoglobin Concentration : 32.5 gm/dL  Auto Neutrophil # : x  Auto Lymphocyte # : x  Auto Monocyte # : x  Auto Eosinophil # : x  Auto Basophil # : x  Auto Neutrophil % : x  Auto Lymphocyte % : x  Auto Monocyte % : x  Auto Eosinophil % : x  Auto Basophil % : x    03-16    138  |  99  |  72<H>  ----------------------------<  154<H>  4.2   |  23  |  1.53<H>  03-15    136  |  96  |  72<H>  ----------------------------<  169<H>  4.2   |  24  |  1.73<H>    Ca    9.4      16 Mar 2019 07:01  Ca    9.4      15 Mar 2019 06:17  Phos  4.3     03-16  Phos  4.6     03-15  Mg     2.6     03-16  Mg     2.4     03-15      TELEMETRY: 	  NSR 70-90  	    PREVIOUS DIAGNOSTIC TESTING:    [ ] Echocardiogram:  < from: TTE Echo Doppler w/o Cont (03.06.19 @ 15:10) >  CONCLUSIONS:    1. Decreased systolic function with a visually estimated left ventricular   ejection fraction of 25-30%.  2. Multiple regional wall motion abnormalities as described above.  3. LV diastolic dysfunction with evidence of elevated filling pressures.  4. Moderate posterior mitral annular calcification. Mild mitral valve   regurgitation.  5. Calcified trileaflet aortic valve. Moderate to severe aortic stenosis   and mild aortic valve insufficiency.  6. the right ventricle is grossly normal in size and systolic function.  7. Normal tricuspid valve with mild tricuspid valve regurgitation with   incomplete waveform.  8. Normal pulmonic valve with trace insufficiency.  9. Limited study to estimate pulmonary artery systolic pressure    < end of copied text >    [ ]  Catheterization:  < from: Cardiac Cath Lab - Adult (03.12.19 @ 15:08) >  CORONARY VESSELS: The coronary circulation is right dominant.  LM:   --  LM: Normal.  LAD:   --  Mid LAD: There was a diffuse 70 % stenosis. iFR = 0.77  CX:   --  Mid circumflex: There was a tubular 75 % stenosis. IFR =0.89  --  OM1: Normal.  RCA:   --  Proximal RCA: There was a 20 % stenosis.  --  Distal RCA: There was a 30 % stenosis.  COMPLICATIONS: There were no complications.  DIAGNOSTIC IMPRESSIONS: Elevated LVEDP. IFR abn Lcx and LAD disease  DIAGNOSTIC RECOMMENDATIONS: IV diuresis and med optimization. If clinically  indicated, would pursue PCI of the LAD and Lcx.    < end of copied text >    [ ] Stress Test: 24H hour events:   Feels well. Laying flat without significant orthopnea/PND. No chest pain. No groin pain s/p PCI yesterday    MEDICATIONS:  aspirin enteric coated 81 milliGRAM(s) Oral daily  clopidogrel Tablet 75 milliGRAM(s) Oral daily  furosemide   Injectable 40 milliGRAM(s) IV Push every 12 hours  heparin  Injectable 5000 Unit(s) SubCutaneous every 12 hours  metoprolol succinate  milliGRAM(s) Oral daily  ALBUTerol/ipratropium for Nebulization 3 milliLiter(s) Nebulizer every 6 hours  benzonatate 100 milliGRAM(s) Oral three times a day PRN  guaiFENesin   Syrup  (Sugar-Free) 100 milliGRAM(s) Oral every 6 hours PRN  atorvastatin 40 milliGRAM(s) Oral at bedtime  dextrose 40% Gel 15 Gram(s) Oral once PRN  dextrose 50% Injectable 12.5 Gram(s) IV Push once  dextrose 50% Injectable 25 Gram(s) IV Push once  dextrose 50% Injectable 25 Gram(s) IV Push once  glucagon  Injectable 1 milliGRAM(s) IntraMuscular once PRN  insulin glargine Injectable (LANTUS) 28 Unit(s) SubCutaneous at bedtime  insulin lispro (HumaLOG) corrective regimen sliding scale   SubCutaneous three times a day before meals  insulin lispro (HumaLOG) corrective regimen sliding scale   SubCutaneous at bedtime  insulin lispro Injectable (HumaLOG) 14 Unit(s) SubCutaneous three times a day before meals  brimonidine 0.2% Ophthalmic Solution 1 Drop(s) Both EYES two times a day  dextrose 5%. 1000 milliLiter(s) IV Continuous <Continuous>  dorzolamide 2% Ophthalmic Solution 1 Drop(s) Both EYES three times a day  fluticasone propionate 50 MICROgram(s)/spray Nasal Spray 1 Spray(s) Both Nostrils two times a day  influenza   Vaccine 0.5 milliLiter(s) IntraMuscular once  latanoprost 0.005% Ophthalmic Solution 1 Drop(s) Both EYES at bedtime  nystatin Powder 1 Application(s) Topical two times a day        PHYSICAL EXAM:  T(C): 36.4 (03-16-19 @ 04:42), Max: 36.7 (03-15-19 @ 14:30)  HR: 89 (03-16-19 @ 09:40) (82 - 91)  BP: 126/78 (03-16-19 @ 09:40) (122/71 - 128/64)  RR: 18 (03-16-19 @ 04:42) (16 - 18)  SpO2: 96% (03-16-19 @ 04:42) (95% - 98%)  Wt(kg): --      I&O's Summary    15 Mar 2019 07:01  -  16 Mar 2019 07:00  --------------------------------------------------------  IN: 600 mL / OUT: 600 mL / NET: 0 mL        Physical Exam:  Appearance: No acute distress; well appearing  HEENT:  EOMI, sclera anicteric, Normal oral mucosa  Cardiovascular: RRR, S1, S2, 2/6  c/d murmer RUSB, rubs, or gallops; no edema; no JVD, 1+ edema b/l  Respiratory: decreased breath sounds R base  Gastrointestinal: soft, non-tender, non-distended with normal bowel sounds  Musculoskeletal: No clubbing; no joint deformity   Neurologic: Non-focal  Lymphatic: No lymphadenopathy  Psychiatry: AAOx3, mood & affect appropriate  Skin: No rashes, ecchymoses, or cyanosis    Groin site c/d/i. No hematoma/bruit      LABS:	 	    CBC Full  -  ( 15 Mar 2019 09:19 )  WBC Count : 13.80 K/uL  Hemoglobin : 12.3 g/dL  Hematocrit : 37.9 %  Platelet Count - Automated : 352 K/uL  Mean Cell Volume : 90.9 fl  Mean Cell Hemoglobin : 29.5 pg  Mean Cell Hemoglobin Concentration : 32.5 gm/dL  Auto Neutrophil # : x  Auto Lymphocyte # : x  Auto Monocyte # : x  Auto Eosinophil # : x  Auto Basophil # : x  Auto Neutrophil % : x  Auto Lymphocyte % : x  Auto Monocyte % : x  Auto Eosinophil % : x  Auto Basophil % : x    03-16    138  |  99  |  72<H>  ----------------------------<  154<H>  4.2   |  23  |  1.53<H>  03-15    136  |  96  |  72<H>  ----------------------------<  169<H>  4.2   |  24  |  1.73<H>    Ca    9.4      16 Mar 2019 07:01  Ca    9.4      15 Mar 2019 06:17  Phos  4.3     03-16  Phos  4.6     03-15  Mg     2.6     03-16  Mg     2.4     03-15      TELEMETRY: 	  NSR 70-90  	    PREVIOUS DIAGNOSTIC TESTING:    [ ] Echocardiogram:  < from: TTE Echo Doppler w/o Cont (03.06.19 @ 15:10) >  CONCLUSIONS:    1. Decreased systolic function with a visually estimated left ventricular   ejection fraction of 25-30%.  2. Multiple regional wall motion abnormalities as described above.  3. LV diastolic dysfunction with evidence of elevated filling pressures.  4. Moderate posterior mitral annular calcification. Mild mitral valve   regurgitation.  5. Calcified trileaflet aortic valve. Moderate to severe aortic stenosis   and mild aortic valve insufficiency.  6. the right ventricle is grossly normal in size and systolic function.  7. Normal tricuspid valve with mild tricuspid valve regurgitation with   incomplete waveform.  8. Normal pulmonic valve with trace insufficiency.  9. Limited study to estimate pulmonary artery systolic pressure    < end of copied text >    [ ]  Catheterization:  < from: Cardiac Cath Lab - Adult (03.12.19 @ 15:08) >  CORONARY VESSELS: The coronary circulation is right dominant.  LM:   --  LM: Normal.  LAD:   --  Mid LAD: There was a diffuse 70 % stenosis. iFR = 0.77  CX:   --  Mid circumflex: There was a tubular 75 % stenosis. IFR =0.89  --  OM1: Normal.  RCA:   --  Proximal RCA: There was a 20 % stenosis.  --  Distal RCA: There was a 30 % stenosis.  COMPLICATIONS: There were no complications.  DIAGNOSTIC IMPRESSIONS: Elevated LVEDP. IFR abn Lcx and LAD disease  DIAGNOSTIC RECOMMENDATIONS: IV diuresis and med optimization. If clinically  indicated, would pursue PCI of the LAD and Lcx.    < end of copied text >    [ ] Stress Test: 24H hour events:   Feels well. Laying flat without significant orthopnea/PND. No chest pain. No groin pain s/p PCI yesterday    MEDICATIONS:  aspirin enteric coated 81 milliGRAM(s) Oral daily  clopidogrel Tablet 75 milliGRAM(s) Oral daily  furosemide   Injectable 40 milliGRAM(s) IV Push every 12 hours  heparin  Injectable 5000 Unit(s) SubCutaneous every 12 hours  metoprolol succinate  milliGRAM(s) Oral daily  ALBUTerol/ipratropium for Nebulization 3 milliLiter(s) Nebulizer every 6 hours  benzonatate 100 milliGRAM(s) Oral three times a day PRN  guaiFENesin   Syrup  (Sugar-Free) 100 milliGRAM(s) Oral every 6 hours PRN  atorvastatin 40 milliGRAM(s) Oral at bedtime  dextrose 40% Gel 15 Gram(s) Oral once PRN  dextrose 50% Injectable 12.5 Gram(s) IV Push once  dextrose 50% Injectable 25 Gram(s) IV Push once  dextrose 50% Injectable 25 Gram(s) IV Push once  glucagon  Injectable 1 milliGRAM(s) IntraMuscular once PRN  insulin glargine Injectable (LANTUS) 28 Unit(s) SubCutaneous at bedtime  insulin lispro (HumaLOG) corrective regimen sliding scale   SubCutaneous three times a day before meals  insulin lispro (HumaLOG) corrective regimen sliding scale   SubCutaneous at bedtime  insulin lispro Injectable (HumaLOG) 14 Unit(s) SubCutaneous three times a day before meals  brimonidine 0.2% Ophthalmic Solution 1 Drop(s) Both EYES two times a day  dextrose 5%. 1000 milliLiter(s) IV Continuous <Continuous>  dorzolamide 2% Ophthalmic Solution 1 Drop(s) Both EYES three times a day  fluticasone propionate 50 MICROgram(s)/spray Nasal Spray 1 Spray(s) Both Nostrils two times a day  influenza   Vaccine 0.5 milliLiter(s) IntraMuscular once  latanoprost 0.005% Ophthalmic Solution 1 Drop(s) Both EYES at bedtime  nystatin Powder 1 Application(s) Topical two times a day        PHYSICAL EXAM:  T(C): 36.4 (03-16-19 @ 04:42), Max: 36.7 (03-15-19 @ 14:30)  HR: 89 (03-16-19 @ 09:40) (82 - 91)  BP: 126/78 (03-16-19 @ 09:40) (122/71 - 128/64)  RR: 18 (03-16-19 @ 04:42) (16 - 18)  SpO2: 96% (03-16-19 @ 04:42) (95% - 98%)  Wt(kg): --      I&O's Summary    15 Mar 2019 07:01  -  16 Mar 2019 07:00  --------------------------------------------------------  IN: 600 mL / OUT: 600 mL / NET: 0 mL        Physical Exam:  Appearance: No acute distress; well appearing  HEENT:  EOMI, sclera anicteric, Normal oral mucosa  Cardiovascular: RRR, S1, S2, No murmers, rubs, or gallops; no edema; no JVD, No Edema  Respiratory: decreased breath sounds R base  Gastrointestinal: soft, non-tender, non-distended with normal bowel sounds  Musculoskeletal: No clubbing; no joint deformity   Neurologic: Non-focal  Lymphatic: No lymphadenopathy  Psychiatry: AAOx3, mood & affect appropriate  Skin: No rashes, ecchymoses, or cyanosis    Groin site c/d/i. No hematoma/bruit      LABS:	 	    CBC Full  -  ( 15 Mar 2019 09:19 )  WBC Count : 13.80 K/uL  Hemoglobin : 12.3 g/dL  Hematocrit : 37.9 %  Platelet Count - Automated : 352 K/uL  Mean Cell Volume : 90.9 fl  Mean Cell Hemoglobin : 29.5 pg  Mean Cell Hemoglobin Concentration : 32.5 gm/dL  Auto Neutrophil # : x  Auto Lymphocyte # : x  Auto Monocyte # : x  Auto Eosinophil # : x  Auto Basophil # : x  Auto Neutrophil % : x  Auto Lymphocyte % : x  Auto Monocyte % : x  Auto Eosinophil % : x  Auto Basophil % : x    03-16    138  |  99  |  72<H>  ----------------------------<  154<H>  4.2   |  23  |  1.53<H>  03-15    136  |  96  |  72<H>  ----------------------------<  169<H>  4.2   |  24  |  1.73<H>    Ca    9.4      16 Mar 2019 07:01  Ca    9.4      15 Mar 2019 06:17  Phos  4.3     03-16  Phos  4.6     03-15  Mg     2.6     03-16  Mg     2.4     03-15      TELEMETRY: 	  NSR 70-90  	    PREVIOUS DIAGNOSTIC TESTING:    [ ] Echocardiogram:  < from: TTE Echo Doppler w/o Cont (03.06.19 @ 15:10) >  CONCLUSIONS:    1. Decreased systolic function with a visually estimated left ventricular   ejection fraction of 25-30%.  2. Multiple regional wall motion abnormalities as described above.  3. LV diastolic dysfunction with evidence of elevated filling pressures.  4. Moderate posterior mitral annular calcification. Mild mitral valve   regurgitation.  5. Calcified trileaflet aortic valve. Moderate to severe aortic stenosis   and mild aortic valve insufficiency.  6. the right ventricle is grossly normal in size and systolic function.  7. Normal tricuspid valve with mild tricuspid valve regurgitation with   incomplete waveform.  8. Normal pulmonic valve with trace insufficiency.  9. Limited study to estimate pulmonary artery systolic pressure    < end of copied text >    [ ]  Catheterization:  < from: Cardiac Cath Lab - Adult (03.12.19 @ 15:08) >  CORONARY VESSELS: The coronary circulation is right dominant.  LM:   --  LM: Normal.  LAD:   --  Mid LAD: There was a diffuse 70 % stenosis. iFR = 0.77  CX:   --  Mid circumflex: There was a tubular 75 % stenosis. IFR =0.89  --  OM1: Normal.  RCA:   --  Proximal RCA: There was a 20 % stenosis.  --  Distal RCA: There was a 30 % stenosis.  COMPLICATIONS: There were no complications.  DIAGNOSTIC IMPRESSIONS: Elevated LVEDP. IFR abn Lcx and LAD disease  DIAGNOSTIC RECOMMENDATIONS: IV diuresis and med optimization. If clinically  indicated, would pursue PCI of the LAD and Lcx.    < end of copied text >    [ ] Stress Test:

## 2019-03-16 NOTE — PROGRESS NOTE ADULT - ASSESSMENT
73yoF w/ PMHx significant for HTN, HLD, CAD, DM2 (on Lantus, glimepiride), asthma, glaucoma, uterine cancer s/p RT and MAYNOR-BSO presents as transfer from [facility] for NSTEMI, LIZETTE, acute hypoxic respiratory failure 2/2 acute decompensated heart failure, asthma exacerbation, and influenza A (resolved) s/p LHC 3/12 revealing 70% mLAD & 75% m LCx, and elevated LVEDP so now on diuresis planning staged PCI after improved volume control and lizette resolution. 73yoF w/ PMHx significant for HTN, HLD, CAD, DM2 (on Lantus, glimepiride), asthma, glaucoma, uterine cancer s/p RT and MAYNOR-BSO presents as transfer from [facility] for NSTEMI, FLORIDALMA, acute hypoxic respiratory failure 2/2 acute decompensated heart failure, asthma exacerbation, and influenza A (resolved) s/p LHC 3/12 revealing 70% mLAD & 75% m LCx, and elevated LVEDP, no intervention. Started on diuresis. No s/p LHC through LFA w/t 1 TONY to mLCx. Plan for staged PCI to LAD on Monday.

## 2019-03-16 NOTE — PROGRESS NOTE ADULT - SUBJECTIVE AND OBJECTIVE BOX
Dr. Ren Fierro   Internal Medicine PGY-1  Pager #[phone]    Patient is a 73y old  Female who presents with a chief complaint of NSTEMI, acute hypoxic respiratory failure, acute decompensated heart failure, FLORIDALMA, influenza A (15 Mar 2019 07:48)      SUBJECTIVE / OVERNIGHT EVENTS:  S/p LHC yesterday. No CP, SOB, hematoma.    MEDICATIONS  (STANDING):  ALBUTerol/ipratropium for Nebulization 3 milliLiter(s) Nebulizer every 6 hours  aspirin enteric coated 81 milliGRAM(s) Oral daily  atorvastatin 40 milliGRAM(s) Oral at bedtime  brimonidine 0.2% Ophthalmic Solution 1 Drop(s) Both EYES two times a day  clopidogrel Tablet 75 milliGRAM(s) Oral daily  dextrose 5%. 1000 milliLiter(s) (50 mL/Hr) IV Continuous <Continuous>  dextrose 50% Injectable 12.5 Gram(s) IV Push once  dextrose 50% Injectable 25 Gram(s) IV Push once  dextrose 50% Injectable 25 Gram(s) IV Push once  dorzolamide 2% Ophthalmic Solution 1 Drop(s) Both EYES three times a day  fluticasone propionate 50 MICROgram(s)/spray Nasal Spray 1 Spray(s) Both Nostrils two times a day  furosemide   Injectable 40 milliGRAM(s) IV Push every 12 hours  heparin  Injectable 5000 Unit(s) SubCutaneous every 12 hours  influenza   Vaccine 0.5 milliLiter(s) IntraMuscular once  insulin glargine Injectable (LANTUS) 28 Unit(s) SubCutaneous at bedtime  insulin lispro (HumaLOG) corrective regimen sliding scale   SubCutaneous three times a day before meals  insulin lispro (HumaLOG) corrective regimen sliding scale   SubCutaneous at bedtime  insulin lispro Injectable (HumaLOG) 14 Unit(s) SubCutaneous three times a day before meals  latanoprost 0.005% Ophthalmic Solution 1 Drop(s) Both EYES at bedtime  metoprolol succinate  milliGRAM(s) Oral daily  nystatin Powder 1 Application(s) Topical two times a day    MEDICATIONS  (PRN):  benzonatate 100 milliGRAM(s) Oral three times a day PRN Cough  dextrose 40% Gel 15 Gram(s) Oral once PRN Blood Glucose LESS THAN 70 milliGRAM(s)/deciliter  glucagon  Injectable 1 milliGRAM(s) IntraMuscular once PRN Glucose LESS THAN 70 milligrams/deciliter  guaiFENesin   Syrup  (Sugar-Free) 100 milliGRAM(s) Oral every 6 hours PRN Cough      Vital Signs Last 24 Hrs  T(C): 36.4 (16 Mar 2019 04:42), Max: 36.7 (15 Mar 2019 14:30)  T(F): 97.5 (16 Mar 2019 04:42), Max: 98.1 (15 Mar 2019 14:30)  HR: 87 (16 Mar 2019 04:42) (82 - 91)  BP: 126/75 (16 Mar 2019 04:42) (122/71 - 128/64)  BP(mean): --  RR: 18 (16 Mar 2019 04:42) (16 - 18)  SpO2: 96% (16 Mar 2019 04:42) (95% - 98%)  CAPILLARY BLOOD GLUCOSE      POCT Blood Glucose.: 156 mg/dL (15 Mar 2019 21:51)  POCT Blood Glucose.: 186 mg/dL (15 Mar 2019 11:45)  POCT Blood Glucose.: 216 mg/dL (15 Mar 2019 07:38)    I&O's Summary    14 Mar 2019 07:01  -  15 Mar 2019 07:00  --------------------------------------------------------  IN: 1380 mL / OUT: 1250 mL / NET: 130 mL    15 Mar 2019 07:01  -  16 Mar 2019 06:42  --------------------------------------------------------  IN: 600 mL / OUT: 600 mL / NET: 0 mL        PHYSICAL EXAM:  GENERAL: NAD, well-developed  HEAD:  Atraumatic, Normocephalic  EYES: EOMI, PERRLA, conjunctiva and sclera clear  NECK: Supple, No JVD  CHEST/LUNG: Clear to auscultation bilaterally; No wheeze  HEART: Regular rate and rhythm; No murmurs, rubs, or gallops  ABDOMEN: Soft, Nontender, Nondistended; Bowel sounds present  EXTREMITIES:  2+ Peripheral Pulses, No clubbing, cyanosis, or edema  PSYCH: AAOx3  NEUROLOGY: non-focal  SKIN: No rashes or lesions    LABS:                        12.3   13.80 )-----------( 352      ( 15 Mar 2019 09:19 )             37.9     03-15    136  |  96  |  72<H>  ----------------------------<  169<H>  4.2   |  24  |  1.73<H>    Ca    9.4      15 Mar 2019 06:17  Phos  4.6     03-15  Mg     2.4     03-15      PT/INR - ( 15 Mar 2019 08:21 )   PT: 12.7 sec;   INR: 1.12 ratio         PTT - ( 15 Mar 2019 08:21 )  PTT:35.8 sec          RADIOLOGY & ADDITIONAL TESTS:    Imaging Personally Reviewed:    Consultant(s) Notes Reviewed:      Care Discussed with Consultants/Other Providers: Dr. Ren Fierro   Internal Medicine PGY-1  Pager #[phone]    Patient is a 73y old  Female who presents with a chief complaint of NSTEMI, acute hypoxic respiratory failure, acute decompensated heart failure, FLORIDALMA, influenza A (15 Mar 2019 07:48)      SUBJECTIVE / OVERNIGHT EVENTS:  S/p LHC through LFA w/t 1 TONY to mLCx, plan for staged PCI on Mon to LAD. No CP, SOB, L groin hematoma or bleed. No palpitations. Wt decreased to 169.9lbs.     Tele: NSR 70-80, no ectopy    MEDICATIONS  (STANDING):  ALBUTerol/ipratropium for Nebulization 3 milliLiter(s) Nebulizer every 6 hours  aspirin enteric coated 81 milliGRAM(s) Oral daily  atorvastatin 40 milliGRAM(s) Oral at bedtime  brimonidine 0.2% Ophthalmic Solution 1 Drop(s) Both EYES two times a day  clopidogrel Tablet 75 milliGRAM(s) Oral daily  dextrose 5%. 1000 milliLiter(s) (50 mL/Hr) IV Continuous <Continuous>  dextrose 50% Injectable 12.5 Gram(s) IV Push once  dextrose 50% Injectable 25 Gram(s) IV Push once  dextrose 50% Injectable 25 Gram(s) IV Push once  dorzolamide 2% Ophthalmic Solution 1 Drop(s) Both EYES three times a day  fluticasone propionate 50 MICROgram(s)/spray Nasal Spray 1 Spray(s) Both Nostrils two times a day  furosemide   Injectable 40 milliGRAM(s) IV Push every 12 hours  heparin  Injectable 5000 Unit(s) SubCutaneous every 12 hours  influenza   Vaccine 0.5 milliLiter(s) IntraMuscular once  insulin glargine Injectable (LANTUS) 28 Unit(s) SubCutaneous at bedtime  insulin lispro (HumaLOG) corrective regimen sliding scale   SubCutaneous three times a day before meals  insulin lispro (HumaLOG) corrective regimen sliding scale   SubCutaneous at bedtime  insulin lispro Injectable (HumaLOG) 14 Unit(s) SubCutaneous three times a day before meals  latanoprost 0.005% Ophthalmic Solution 1 Drop(s) Both EYES at bedtime  metoprolol succinate  milliGRAM(s) Oral daily  nystatin Powder 1 Application(s) Topical two times a day    MEDICATIONS  (PRN):  benzonatate 100 milliGRAM(s) Oral three times a day PRN Cough  dextrose 40% Gel 15 Gram(s) Oral once PRN Blood Glucose LESS THAN 70 milliGRAM(s)/deciliter  glucagon  Injectable 1 milliGRAM(s) IntraMuscular once PRN Glucose LESS THAN 70 milligrams/deciliter  guaiFENesin   Syrup  (Sugar-Free) 100 milliGRAM(s) Oral every 6 hours PRN Cough      Vital Signs Last 24 Hrs  T(C): 36.4 (16 Mar 2019 04:42), Max: 36.7 (15 Mar 2019 14:30)  T(F): 97.5 (16 Mar 2019 04:42), Max: 98.1 (15 Mar 2019 14:30)  HR: 87 (16 Mar 2019 04:42) (82 - 91)  BP: 126/75 (16 Mar 2019 04:42) (122/71 - 128/64)  BP(mean): --  RR: 18 (16 Mar 2019 04:42) (16 - 18)  SpO2: 96% (16 Mar 2019 04:42) (95% - 98%)  CAPILLARY BLOOD GLUCOSE      POCT Blood Glucose.: 156 mg/dL (15 Mar 2019 21:51)  POCT Blood Glucose.: 186 mg/dL (15 Mar 2019 11:45)  POCT Blood Glucose.: 216 mg/dL (15 Mar 2019 07:38)    I&O's Summary    14 Mar 2019 07:01  -  15 Mar 2019 07:00  --------------------------------------------------------  IN: 1380 mL / OUT: 1250 mL / NET: 130 mL    15 Mar 2019 07:01  -  16 Mar 2019 06:42  --------------------------------------------------------  IN: 600 mL / OUT: 600 mL / NET: 0 mL        PHYSICAL EXAM:  GENERAL: NAD, well-developed  HEAD:  Atraumatic, Normocephalic  EYES: EOMI, PERRLA, conjunctiva and sclera clear  NECK: Supple, No JVD  CHEST/LUNG: Clear to auscultation bilaterally; No wheeze  HEART: Regular rate and rhythm; No murmurs, rubs, or gallops  ABDOMEN: Soft, Nontender, distended; Bowel sounds present  PELVIS: LFA site bandage CDI, no hematoma palpable.  EXTREMITIES:  2+ Peripheral Pulses, No clubbing, cyanosis, or edema  PSYCH: AAOx3  NEUROLOGY: non-focal  SKIN: No rashes or lesions    LABS:                        12.3   13.80 )-----------( 352      ( 15 Mar 2019 09:19 )             37.9     03-15    136  |  96  |  72<H>  ----------------------------<  169<H>  4.2   |  24  |  1.73<H>    Ca    9.4      15 Mar 2019 06:17  Phos  4.6     03-15  Mg     2.4     03-15      PT/INR - ( 15 Mar 2019 08:21 )   PT: 12.7 sec;   INR: 1.12 ratio         PTT - ( 15 Mar 2019 08:21 )  PTT:35.8 sec          RADIOLOGY & ADDITIONAL TESTS:

## 2019-03-17 LAB
ANION GAP SERPL CALC-SCNC: 16 MMOL/L — SIGNIFICANT CHANGE UP (ref 5–17)
BUN SERPL-MCNC: 72 MG/DL — HIGH (ref 7–23)
CALCIUM SERPL-MCNC: 9.5 MG/DL — SIGNIFICANT CHANGE UP (ref 8.4–10.5)
CHLORIDE SERPL-SCNC: 98 MMOL/L — SIGNIFICANT CHANGE UP (ref 96–108)
CO2 SERPL-SCNC: 23 MMOL/L — SIGNIFICANT CHANGE UP (ref 22–31)
CREAT SERPL-MCNC: 1.73 MG/DL — HIGH (ref 0.5–1.3)
GLUCOSE SERPL-MCNC: 154 MG/DL — HIGH (ref 70–99)
HCT VFR BLD CALC: 36.6 % — SIGNIFICANT CHANGE UP (ref 34.5–45)
HGB BLD-MCNC: 11.5 G/DL — SIGNIFICANT CHANGE UP (ref 11.5–15.5)
MAGNESIUM SERPL-MCNC: 2.5 MG/DL — SIGNIFICANT CHANGE UP (ref 1.6–2.6)
MCHC RBC-ENTMCNC: 29 PG — SIGNIFICANT CHANGE UP (ref 27–34)
MCHC RBC-ENTMCNC: 31.4 GM/DL — LOW (ref 32–36)
MCV RBC AUTO: 92.2 FL — SIGNIFICANT CHANGE UP (ref 80–100)
PHOSPHATE SERPL-MCNC: 4.4 MG/DL — SIGNIFICANT CHANGE UP (ref 2.5–4.5)
PLATELET # BLD AUTO: 345 K/UL — SIGNIFICANT CHANGE UP (ref 150–400)
POTASSIUM SERPL-MCNC: 4.1 MMOL/L — SIGNIFICANT CHANGE UP (ref 3.5–5.3)
POTASSIUM SERPL-SCNC: 4.1 MMOL/L — SIGNIFICANT CHANGE UP (ref 3.5–5.3)
RBC # BLD: 3.97 M/UL — SIGNIFICANT CHANGE UP (ref 3.8–5.2)
RBC # FLD: 12.9 % — SIGNIFICANT CHANGE UP (ref 10.3–14.5)
SODIUM SERPL-SCNC: 137 MMOL/L — SIGNIFICANT CHANGE UP (ref 135–145)
WBC # BLD: 11.35 K/UL — HIGH (ref 3.8–10.5)
WBC # FLD AUTO: 11.35 K/UL — HIGH (ref 3.8–10.5)

## 2019-03-17 PROCEDURE — 99233 SBSQ HOSP IP/OBS HIGH 50: CPT

## 2019-03-17 PROCEDURE — 99232 SBSQ HOSP IP/OBS MODERATE 35: CPT

## 2019-03-17 RX ADMIN — LATANOPROST 1 DROP(S): 0.05 SOLUTION/ DROPS OPHTHALMIC; TOPICAL at 21:51

## 2019-03-17 RX ADMIN — Medication 100 MILLIGRAM(S): at 05:24

## 2019-03-17 RX ADMIN — NYSTATIN CREAM 1 APPLICATION(S): 100000 CREAM TOPICAL at 17:24

## 2019-03-17 RX ADMIN — DORZOLAMIDE HYDROCHLORIDE 1 DROP(S): 20 SOLUTION/ DROPS OPHTHALMIC at 05:24

## 2019-03-17 RX ADMIN — Medication 3 MILLILITER(S): at 21:51

## 2019-03-17 RX ADMIN — INSULIN GLARGINE 28 UNIT(S): 100 INJECTION, SOLUTION SUBCUTANEOUS at 22:27

## 2019-03-17 RX ADMIN — Medication 3 MILLILITER(S): at 05:24

## 2019-03-17 RX ADMIN — DORZOLAMIDE HYDROCHLORIDE 1 DROP(S): 20 SOLUTION/ DROPS OPHTHALMIC at 21:51

## 2019-03-17 RX ADMIN — Medication 100 MILLIGRAM(S): at 17:24

## 2019-03-17 RX ADMIN — Medication 100 MILLIGRAM(S): at 17:25

## 2019-03-17 RX ADMIN — Medication 1 SPRAY(S): at 05:24

## 2019-03-17 RX ADMIN — Medication 1 SPRAY(S): at 17:24

## 2019-03-17 RX ADMIN — ATORVASTATIN CALCIUM 40 MILLIGRAM(S): 80 TABLET, FILM COATED ORAL at 21:51

## 2019-03-17 RX ADMIN — Medication 14 UNIT(S): at 08:13

## 2019-03-17 RX ADMIN — Medication 3 MILLILITER(S): at 11:57

## 2019-03-17 RX ADMIN — Medication 100 MILLIGRAM(S): at 05:23

## 2019-03-17 RX ADMIN — Medication 40 MILLIGRAM(S): at 05:24

## 2019-03-17 RX ADMIN — Medication 100 MILLIGRAM(S): at 11:57

## 2019-03-17 RX ADMIN — Medication 2: at 12:12

## 2019-03-17 RX ADMIN — Medication 14 UNIT(S): at 12:11

## 2019-03-17 RX ADMIN — HEPARIN SODIUM 5000 UNIT(S): 5000 INJECTION INTRAVENOUS; SUBCUTANEOUS at 17:24

## 2019-03-17 RX ADMIN — CLOPIDOGREL BISULFATE 75 MILLIGRAM(S): 75 TABLET, FILM COATED ORAL at 11:57

## 2019-03-17 RX ADMIN — Medication 81 MILLIGRAM(S): at 11:56

## 2019-03-17 RX ADMIN — Medication 14 UNIT(S): at 17:25

## 2019-03-17 RX ADMIN — Medication 4: at 08:13

## 2019-03-17 RX ADMIN — BRIMONIDINE TARTRATE 1 DROP(S): 2 SOLUTION/ DROPS OPHTHALMIC at 21:51

## 2019-03-17 RX ADMIN — HEPARIN SODIUM 5000 UNIT(S): 5000 INJECTION INTRAVENOUS; SUBCUTANEOUS at 05:24

## 2019-03-17 RX ADMIN — BRIMONIDINE TARTRATE 1 DROP(S): 2 SOLUTION/ DROPS OPHTHALMIC at 05:24

## 2019-03-17 RX ADMIN — Medication 40 MILLIGRAM(S): at 17:24

## 2019-03-17 RX ADMIN — Medication 3 MILLILITER(S): at 17:25

## 2019-03-17 RX ADMIN — NYSTATIN CREAM 1 APPLICATION(S): 100000 CREAM TOPICAL at 05:25

## 2019-03-17 NOTE — PROGRESS NOTE ADULT - SUBJECTIVE AND OBJECTIVE BOX
24H hour events: no acute events overnight  pt resting  denies cp, sob  tele: SR 70-80s    MEDICATIONS:  aspirin enteric coated 81 milliGRAM(s) Oral daily  clopidogrel Tablet 75 milliGRAM(s) Oral daily  furosemide   Injectable 40 milliGRAM(s) IV Push every 12 hours  heparin  Injectable 5000 Unit(s) SubCutaneous every 12 hours  metoprolol succinate  milliGRAM(s) Oral daily      ALBUTerol/ipratropium for Nebulization 3 milliLiter(s) Nebulizer every 6 hours  benzonatate 100 milliGRAM(s) Oral three times a day PRN  guaiFENesin   Syrup  (Sugar-Free) 100 milliGRAM(s) Oral every 6 hours PRN        atorvastatin 40 milliGRAM(s) Oral at bedtime  dextrose 40% Gel 15 Gram(s) Oral once PRN  dextrose 50% Injectable 12.5 Gram(s) IV Push once  dextrose 50% Injectable 25 Gram(s) IV Push once  dextrose 50% Injectable 25 Gram(s) IV Push once  glucagon  Injectable 1 milliGRAM(s) IntraMuscular once PRN  insulin glargine Injectable (LANTUS) 28 Unit(s) SubCutaneous at bedtime  insulin lispro (HumaLOG) corrective regimen sliding scale   SubCutaneous three times a day before meals  insulin lispro (HumaLOG) corrective regimen sliding scale   SubCutaneous at bedtime  insulin lispro Injectable (HumaLOG) 14 Unit(s) SubCutaneous three times a day before meals    brimonidine 0.2% Ophthalmic Solution 1 Drop(s) Both EYES two times a day  dextrose 5%. 1000 milliLiter(s) IV Continuous <Continuous>  dorzolamide 2% Ophthalmic Solution 1 Drop(s) Both EYES three times a day  fluticasone propionate 50 MICROgram(s)/spray Nasal Spray 1 Spray(s) Both Nostrils two times a day  influenza   Vaccine 0.5 milliLiter(s) IntraMuscular once  latanoprost 0.005% Ophthalmic Solution 1 Drop(s) Both EYES at bedtime  nystatin Powder 1 Application(s) Topical two times a day          PHYSICAL EXAM:  T(C): 36.7 (03-17-19 @ 05:16), Max: 37 (03-16-19 @ 20:58)  HR: 87 (03-17-19 @ 05:16) (85 - 97)  BP: 126/71 (03-17-19 @ 05:16) (123/71 - 133/70)  RR: 18 (03-17-19 @ 05:16) (18 - 19)  SpO2: 96% (03-17-19 @ 05:16) (96% - 97%)  Wt(kg): --  I&O's Summary    16 Mar 2019 07:01  -  17 Mar 2019 07:00  --------------------------------------------------------  IN: 1080 mL / OUT: 1200 mL / NET: -120 mL    17 Mar 2019 07:01  -  17 Mar 2019 11:59  --------------------------------------------------------  IN: 0 mL / OUT: 400 mL / NET: -400 mL        Appearance: Normal	  HEENT:   Normal oral mucosa, PERRL, EOMI	  Lymphatic: No lymphadenopathy  Cardiovascular: Normal S1 S2, No JVD, No murmurs, No edema  Respiratory: Lungs clear to auscultation	  Psychiatry: A & O x 3, Mood & affect appropriate  Gastrointestinal:  Soft, Non-tender, + BS	  Skin: No rashes, No ecchymoses, No cyanosis	  Neurologic: Non-focal  Extremities: Normal range of motion, No clubbing, cyanosis      LABS:	 	    CBC Full  -  ( 17 Mar 2019 08:56 )  WBC Count : 11.35 K/uL  Hemoglobin : 11.5 g/dL  Hematocrit : 36.6 %  Platelet Count - Automated : 345 K/uL  Mean Cell Volume : 92.2 fl  Mean Cell Hemoglobin : 29.0 pg  Mean Cell Hemoglobin Concentration : 31.4 gm/dL  Auto Neutrophil # : x  Auto Lymphocyte # : x  Auto Monocyte # : x  Auto Eosinophil # : x  Auto Basophil # : x  Auto Neutrophil % : x  Auto Lymphocyte % : x  Auto Monocyte % : x  Auto Eosinophil % : x  Auto Basophil % : x    03-17    137  |  98  |  72<H>  ----------------------------<  154<H>  4.1   |  23  |  1.73<H>  03-16    138  |  99  |  72<H>  ----------------------------<  154<H>  4.2   |  23  |  1.53<H>    Ca    9.5      17 Mar 2019 07:03  Ca    9.4      16 Mar 2019 07:01  Phos  4.4     03-17  Phos  4.3     03-16  Mg     2.5     03-17  Mg     2.6     03-16        proBNP:   Lipid Profile:   HgA1c:   TSH:       CARDIAC MARKERS:            TELEMETRY: 	    ECG:  	  RADIOLOGY:  OTHER: 	    PREVIOUS DIAGNOSTIC TESTING:    [ ] Echocardiogram:  [ ]  Catheterization:  [ ] Stress Test:  	  	  ASSESSMENT/PLAN:

## 2019-03-17 NOTE — PROVIDER CONTACT NOTE (OTHER) - ASSESSMENT
VSS, denies chest pain, palpitation, lightheadedness, dizziness. asymptomatic, resting In chair at time of ectopy

## 2019-03-17 NOTE — PROGRESS NOTE ADULT - ASSESSMENT
73 year old F pt with PMH of HTN, HLD, CAD, DM2, and uterine cancer s/p MAYNOR-BSO presented as a transfer from [place] for NSTEMI in the setting of influenza A.  Found to have severe segmental LV dysfunction at OSH.  Currently feeling better from a FLU standpoint (off isolation).  Euvolemic, chest pain free.     #CAD  LHC on 3/12 with mLAD 80%, LCx 70%(both significant by iFR.) No intervention performed due to elevated LVEDP of 40  - cont dapt, atorvastatin 40 mg daily, metop succinate 100mg daily  - continue lasix 40mg iv qd, monitor cr  - unable to obtain strict Is/Os given incontinence, will need daily weights  - s/p PCI to LCx. Will go for staged PCI to LAD 3/18/19    #HFrEF- NYHA 3, ACC/AHA C-euvolemic  EF 25-30%. LHC as above  - continue metop succinate as above  - Appears euvolemic on exam.  I/O are inaccurate due to incontinence. NO WEIGHT TODAY. Please check daily weights 73 year old F pt with PMH of HTN, HLD, CAD, DM2, and uterine cancer s/p MAYNOR-BSO presented as a transfer from [place] for NSTEMI in the setting of influenza A.  Found to have severe segmental LV dysfunction at OSH.  Currently feeling better from a FLU standpoint (off isolation).  Euvolemic, chest pain free.     #CAD  LHC on 3/12 with mLAD 80%, LCx 70%(both significant by iFR.) No intervention performed due to elevated LVEDP of 40  - cont dapt, atorvastatin 40 mg daily, metop succinate 100mg daily  - continue lasix 40mg iv qd, monitor cr  - unable to obtain strict Is/Os given incontinence, will need daily weights  - s/p PCI to LCx. Will go for staged PCI to LAD 3/18/19    #HFrEF- NYHA 3, ACC/AHA C-euvolemic  EF 25-30%. LHC as above  - continue metop succinate as above  - Appears euvolemic on exam.

## 2019-03-17 NOTE — PROGRESS NOTE ADULT - SUBJECTIVE AND OBJECTIVE BOX
Jeet Haile MD  Cell: [phone]  Pager: [phone]    S:  No overnight events.  Pt sitting comfortably in chair when examined and has no pain or discomfort.    VITAL SIGNS:  Vital Signs Last 24 Hrs  T(C): 36.7 (17 Mar 2019 05:16), Max: 37 (16 Mar 2019 20:58)  T(F): 98 (17 Mar 2019 05:16), Max: 98.6 (16 Mar 2019 20:58)  HR: 87 (17 Mar 2019 05:16) (85 - 97)  BP: 126/71 (17 Mar 2019 05:16) (123/71 - 133/70)  BP(mean): --  RR: 18 (17 Mar 2019 05:16) (18 - 19)  SpO2: 96% (17 Mar 2019 05:16) (96% - 97%)      PHYSICAL EXAM:   GENERAL: NAD, well-developed  HEAD:  Atraumatic, Normocephalic  EYES: EOMI, conjunctiva and sclera clear  NECK: Supple, No JVD  CHEST/LUNG: Clear to auscultation bilaterally; No wheeze  HEART: Regular rate and rhythm; No murmurs, rubs, or gallops  ABDOMEN: Soft, Nontender, distended; Bowel sounds present  PELVIS: LFA site bandage CDI, no hematoma palpable.  EXTREMITIES:  2+ Peripheral Pulses, No clubbing, cyanosis, or edema  PSYCH: AAOx3  NEUROLOGY: non-focal  SKIN: No rashes or lesions                          11.5   11.35 )-----------( 345      ( 17 Mar 2019 08:56 )             36.6     03-17    137  |  98  |  72<H>  ----------------------------<  154<H>  4.1   |  23  |  1.73<H>    Ca    9.5      17 Mar 2019 07:03  Phos  4.4     03-17  Mg     2.5     03-17        CAPILLARY BLOOD GLUCOSE      POCT Blood Glucose.: 206 mg/dL (17 Mar 2019 07:20)  POCT Blood Glucose.: 167 mg/dL (16 Mar 2019 21:22)  POCT Blood Glucose.: 120 mg/dL (16 Mar 2019 17:07)  POCT Blood Glucose.: 239 mg/dL (16 Mar 2019 11:56)      MEDICATIONS  (STANDING):  ALBUTerol/ipratropium for Nebulization 3 milliLiter(s) Nebulizer every 6 hours  aspirin enteric coated 81 milliGRAM(s) Oral daily  atorvastatin 40 milliGRAM(s) Oral at bedtime  brimonidine 0.2% Ophthalmic Solution 1 Drop(s) Both EYES two times a day  clopidogrel Tablet 75 milliGRAM(s) Oral daily  dextrose 5%. 1000 milliLiter(s) (50 mL/Hr) IV Continuous <Continuous>  dextrose 50% Injectable 12.5 Gram(s) IV Push once  dextrose 50% Injectable 25 Gram(s) IV Push once  dextrose 50% Injectable 25 Gram(s) IV Push once  dorzolamide 2% Ophthalmic Solution 1 Drop(s) Both EYES three times a day  fluticasone propionate 50 MICROgram(s)/spray Nasal Spray 1 Spray(s) Both Nostrils two times a day  furosemide   Injectable 40 milliGRAM(s) IV Push every 12 hours  heparin  Injectable 5000 Unit(s) SubCutaneous every 12 hours  influenza   Vaccine 0.5 milliLiter(s) IntraMuscular once  insulin glargine Injectable (LANTUS) 28 Unit(s) SubCutaneous at bedtime  insulin lispro (HumaLOG) corrective regimen sliding scale   SubCutaneous three times a day before meals  insulin lispro (HumaLOG) corrective regimen sliding scale   SubCutaneous at bedtime  insulin lispro Injectable (HumaLOG) 14 Unit(s) SubCutaneous three times a day before meals  latanoprost 0.005% Ophthalmic Solution 1 Drop(s) Both EYES at bedtime  metoprolol succinate  milliGRAM(s) Oral daily  nystatin Powder 1 Application(s) Topical two times a day

## 2019-03-17 NOTE — PROGRESS NOTE ADULT - ASSESSMENT
73yoF w/ PMHx significant for HTN, HLD, CAD, DM2 (on Lantus, glimepiride), asthma, glaucoma, uterine cancer s/p RT and MAYNOR-BSO presents as transfer from [facility] for NSTEMI, LIZETTE, acute hypoxic respiratory failure 2/2 acute decompensated heart failure, asthma exacerbation, and influenza A (resolved) s/p LHC 3/12 revealing 70% mLAD & 75% m LCx, and elevated LVEDP, no intervention. Started on diuresis. No s/p LHC through LFA w/t 1 TONY to mLCx. Plan for staged PCI to LAD on Monday. \    Problem: NSTEMI (non-ST elevated myocardial infarction)  Assessment and Plan: NSTEMI (non-ST elevated myocardial infarction) s/p LHC w/t 70% mLAD and 75% mLCx, now s/p 1 TONY to LCx. Plan for staged PCI to LAD on Mon.  Plan:  - Cards recs:  - 40mg IV BID lasix  - strict I/Os, daily weights (standing)  - c/w ASA, plavix,   - c/w metoprolol succinate 100 daily  - c/w statin  - Hold ARB 2/2 LIZETTE and plan for cath  - Trend BMP, keep K>4, Mg>2  - Continue to monitor on telemetry.     Problem: Acute decompensated heart failure  Assessment and Plan:  Acute systolic heart failure.  Plan: 2/2 NSTEMI, mod-severe AS, and asthma exacerbation, required BIPAP at OSH, now on RA   NYHA 3, ACC/AHA C  -c/w 40mg IV lasix bid   -Strict I/Os, daily weights (standing)  -metop succ 100mg QD  -staged PCI to LAD Mon    Problem: Acute respiratory failure with hypoxia  Assessment and Plan: Problem: Acute respiratory failure with hypoxia.  Plan: 2/2 ADHF 2/2 flu A + c/b AS (LVEDP 40)  - 2/2 pulmonary edema, asthma exacerbation, and influenza A   - currently off of any O2 and satting well  - Management as above for HF and asthma exacerbation  - Duonebs Q6H PRN wheeze.     Problem: Hypertension  Assessment and Plan: Hypertension.  Plan: - continue BB,   -hold ARB 2/2 ilzette and plan for staged pci     Problem: LIZETTE (acute kidney injury)  Assessment and Plan: Problem: LIZETTE (acute kidney injury).  Plan: Baseline 1.06-1.21 per PCP office   - daily BMP   - strict I/Os, avoid nephrotoxins, renally dose meds  - continue to hold home oxybutin as can cause retention.    Problem: Vulvovaginal candidiasis  Assessment and Plan: Improving   Plan: Vaginal erythema and pruritis likely 2/2 Vulvovaginitis - IMPROVED  - c/w nystatin powder overnight   - Likely irritant dermatitis from urine.    Problem: Diabetes mellitus  Assessment and Plan: Problem: Type 2 diabetes mellitus with hypoglycemia without coma, with long-term current use of insulin.   Plan: A1c 6.8.   - Lantus 28u QHS  - premeal lispro 14u QAC TID  - moderate ISS  - FSG w/t meals and QHS.     Problem: Asthma exacerbation  Assessment and Plan:  Problem/Plan - 8:  ·  Problem: Asthma exacerbation.  Plan: 2/2 influenza A infection  - supplemental oxygen as needed to maintain sat >92%  - Duoneb Q6H PRN wheeze.     Problem: Prophylactic measure  Assessment and Plan: Plan; VTE ppx: HSQ  Diet: DASH, CC.

## 2019-03-17 NOTE — PROGRESS NOTE ADULT - NSHPATTENDINGPLANDISCUSS_GEN_ALL_CORE
Dr. Haile
Medicine. To reach Cardiology Attending call during weekdays Spectra [phone] or Fellow [phone].
To reach Cardiology Attending call during weekdays Spectra [phone] or Fellow [phone].
Medicine. To reach Cardiology Attending call during weekdays Spectra [phone] or Fellow [phone].
Medicine. To reach Cardiology Attending call during weekdays Spectra [phone] or Fellow [phone].
To reach Cardiology Attending call during weekdays Spectra [phone] or Fellow [phone].
multiple family members bedside

## 2019-03-18 LAB
ALBUMIN SERPL ELPH-MCNC: 3.9 G/DL — SIGNIFICANT CHANGE UP (ref 3.3–5)
ALP SERPL-CCNC: 74 U/L — SIGNIFICANT CHANGE UP (ref 40–120)
ALT FLD-CCNC: 17 U/L — SIGNIFICANT CHANGE UP (ref 10–45)
ANION GAP SERPL CALC-SCNC: 15 MMOL/L — SIGNIFICANT CHANGE UP (ref 5–17)
APTT BLD: 33.4 SEC — SIGNIFICANT CHANGE UP (ref 27.5–36.3)
AST SERPL-CCNC: 21 U/L — SIGNIFICANT CHANGE UP (ref 10–40)
BILIRUB SERPL-MCNC: 0.3 MG/DL — SIGNIFICANT CHANGE UP (ref 0.2–1.2)
BUN SERPL-MCNC: 69 MG/DL — HIGH (ref 7–23)
CALCIUM SERPL-MCNC: 9.5 MG/DL — SIGNIFICANT CHANGE UP (ref 8.4–10.5)
CHLORIDE SERPL-SCNC: 98 MMOL/L — SIGNIFICANT CHANGE UP (ref 96–108)
CO2 SERPL-SCNC: 24 MMOL/L — SIGNIFICANT CHANGE UP (ref 22–31)
CREAT SERPL-MCNC: 1.59 MG/DL — HIGH (ref 0.5–1.3)
GLUCOSE SERPL-MCNC: 128 MG/DL — HIGH (ref 70–99)
HCT VFR BLD CALC: 37.7 % — SIGNIFICANT CHANGE UP (ref 34.5–45)
HGB BLD-MCNC: 12 G/DL — SIGNIFICANT CHANGE UP (ref 11.5–15.5)
INR BLD: 1.09 RATIO — SIGNIFICANT CHANGE UP (ref 0.88–1.16)
MAGNESIUM SERPL-MCNC: 2.5 MG/DL — SIGNIFICANT CHANGE UP (ref 1.6–2.6)
MCHC RBC-ENTMCNC: 29 PG — SIGNIFICANT CHANGE UP (ref 27–34)
MCHC RBC-ENTMCNC: 31.8 GM/DL — LOW (ref 32–36)
MCV RBC AUTO: 91.1 FL — SIGNIFICANT CHANGE UP (ref 80–100)
PHOSPHATE SERPL-MCNC: 4.9 MG/DL — HIGH (ref 2.5–4.5)
PLATELET # BLD AUTO: 347 K/UL — SIGNIFICANT CHANGE UP (ref 150–400)
POTASSIUM SERPL-MCNC: 3.8 MMOL/L — SIGNIFICANT CHANGE UP (ref 3.5–5.3)
POTASSIUM SERPL-SCNC: 3.8 MMOL/L — SIGNIFICANT CHANGE UP (ref 3.5–5.3)
PROT SERPL-MCNC: 7.6 G/DL — SIGNIFICANT CHANGE UP (ref 6–8.3)
PROTHROM AB SERPL-ACNC: 12.4 SEC — SIGNIFICANT CHANGE UP (ref 10–13.1)
RBC # BLD: 4.14 M/UL — SIGNIFICANT CHANGE UP (ref 3.8–5.2)
RBC # FLD: 13 % — SIGNIFICANT CHANGE UP (ref 10.3–14.5)
SODIUM SERPL-SCNC: 137 MMOL/L — SIGNIFICANT CHANGE UP (ref 135–145)
WBC # BLD: 12.04 K/UL — HIGH (ref 3.8–10.5)
WBC # FLD AUTO: 12.04 K/UL — HIGH (ref 3.8–10.5)

## 2019-03-18 PROCEDURE — 92978 ENDOLUMINL IVUS OCT C 1ST: CPT | Mod: 26,LD,GC

## 2019-03-18 PROCEDURE — 93010 ELECTROCARDIOGRAM REPORT: CPT

## 2019-03-18 PROCEDURE — 99233 SBSQ HOSP IP/OBS HIGH 50: CPT | Mod: GC

## 2019-03-18 PROCEDURE — 99152 MOD SED SAME PHYS/QHP 5/>YRS: CPT | Mod: GC

## 2019-03-18 PROCEDURE — 92928 PRQ TCAT PLMT NTRAC ST 1 LES: CPT | Mod: LD,GC

## 2019-03-18 RX ORDER — FUROSEMIDE 40 MG
40 TABLET ORAL DAILY
Qty: 0 | Refills: 0 | Status: DISCONTINUED | OUTPATIENT
Start: 2019-03-18 | End: 2019-03-19

## 2019-03-18 RX ADMIN — Medication 100 MILLIGRAM(S): at 06:08

## 2019-03-18 RX ADMIN — Medication 2: at 07:36

## 2019-03-18 RX ADMIN — BRIMONIDINE TARTRATE 1 DROP(S): 2 SOLUTION/ DROPS OPHTHALMIC at 21:30

## 2019-03-18 RX ADMIN — HEPARIN SODIUM 5000 UNIT(S): 5000 INJECTION INTRAVENOUS; SUBCUTANEOUS at 06:09

## 2019-03-18 RX ADMIN — Medication 1 SPRAY(S): at 06:08

## 2019-03-18 RX ADMIN — Medication 14 UNIT(S): at 17:45

## 2019-03-18 RX ADMIN — NYSTATIN CREAM 1 APPLICATION(S): 100000 CREAM TOPICAL at 17:46

## 2019-03-18 RX ADMIN — Medication 81 MILLIGRAM(S): at 09:35

## 2019-03-18 RX ADMIN — INSULIN GLARGINE 28 UNIT(S): 100 INJECTION, SOLUTION SUBCUTANEOUS at 21:30

## 2019-03-18 RX ADMIN — Medication 100 MILLIGRAM(S): at 21:30

## 2019-03-18 RX ADMIN — Medication 40 MILLIGRAM(S): at 06:08

## 2019-03-18 RX ADMIN — Medication 2: at 15:10

## 2019-03-18 RX ADMIN — Medication 14 UNIT(S): at 07:37

## 2019-03-18 RX ADMIN — ATORVASTATIN CALCIUM 40 MILLIGRAM(S): 80 TABLET, FILM COATED ORAL at 21:30

## 2019-03-18 RX ADMIN — DORZOLAMIDE HYDROCHLORIDE 1 DROP(S): 20 SOLUTION/ DROPS OPHTHALMIC at 06:08

## 2019-03-18 RX ADMIN — Medication 14 UNIT(S): at 15:12

## 2019-03-18 RX ADMIN — LATANOPROST 1 DROP(S): 0.05 SOLUTION/ DROPS OPHTHALMIC; TOPICAL at 21:30

## 2019-03-18 RX ADMIN — HEPARIN SODIUM 5000 UNIT(S): 5000 INJECTION INTRAVENOUS; SUBCUTANEOUS at 17:58

## 2019-03-18 RX ADMIN — NYSTATIN CREAM 1 APPLICATION(S): 100000 CREAM TOPICAL at 06:09

## 2019-03-18 RX ADMIN — Medication 1 SPRAY(S): at 17:45

## 2019-03-18 RX ADMIN — Medication 3 MILLILITER(S): at 15:14

## 2019-03-18 RX ADMIN — BRIMONIDINE TARTRATE 1 DROP(S): 2 SOLUTION/ DROPS OPHTHALMIC at 06:08

## 2019-03-18 RX ADMIN — Medication 3 MILLILITER(S): at 06:09

## 2019-03-18 RX ADMIN — CLOPIDOGREL BISULFATE 75 MILLIGRAM(S): 75 TABLET, FILM COATED ORAL at 09:35

## 2019-03-18 RX ADMIN — DORZOLAMIDE HYDROCHLORIDE 1 DROP(S): 20 SOLUTION/ DROPS OPHTHALMIC at 21:30

## 2019-03-18 NOTE — PROGRESS NOTE ADULT - SUBJECTIVE AND OBJECTIVE BOX
Patient seen and examined at bedside.    Overnight Events: NAEO, feels well.    Review Of Systems:   CONSTITUTIONAL: weakness improved, no diaphoresis and cough improving  EYES/ENT: No visual changes;  No dysphagia  NECK: No pain or stiffness  RESPIRATORY: cough, SOB improving, no wheezing, no hemoptysis  CARDIOVASCULAR: No chest pain or palpitations; No lower extremity edema  GASTROINTESTINAL: No abdominal or epigastric pain. No nausea, vomiting, or hematemesis; No diarrhea or constipation. No melena or hematochezia.  BACK: No back pain  GENITOURINARY: No dysuria, frequency or hematuria  NEUROLOGICAL: No numbness or weakness  SKIN: No itching, burning, rashes, or lesions            Current Meds:  ALBUTerol/ipratropium for Nebulization 3 milliLiter(s) Nebulizer every 6 hours  aspirin enteric coated 81 milliGRAM(s) Oral daily  atorvastatin 40 milliGRAM(s) Oral at bedtime  benzonatate 100 milliGRAM(s) Oral three times a day PRN  brimonidine 0.2% Ophthalmic Solution 1 Drop(s) Both EYES two times a day  clopidogrel Tablet 75 milliGRAM(s) Oral daily  dextrose 40% Gel 15 Gram(s) Oral once PRN  dextrose 5%. 1000 milliLiter(s) IV Continuous <Continuous>  dextrose 50% Injectable 12.5 Gram(s) IV Push once  dextrose 50% Injectable 25 Gram(s) IV Push once  dextrose 50% Injectable 25 Gram(s) IV Push once  dorzolamide 2% Ophthalmic Solution 1 Drop(s) Both EYES three times a day  fluticasone propionate 50 MICROgram(s)/spray Nasal Spray 1 Spray(s) Both Nostrils two times a day  furosemide   Injectable 40 milliGRAM(s) IV Push daily  glucagon  Injectable 1 milliGRAM(s) IntraMuscular once PRN  guaiFENesin   Syrup  (Sugar-Free) 100 milliGRAM(s) Oral every 6 hours PRN  heparin  Injectable 5000 Unit(s) SubCutaneous every 12 hours  influenza   Vaccine 0.5 milliLiter(s) IntraMuscular once  insulin glargine Injectable (LANTUS) 28 Unit(s) SubCutaneous at bedtime  insulin lispro (HumaLOG) corrective regimen sliding scale   SubCutaneous three times a day before meals  insulin lispro (HumaLOG) corrective regimen sliding scale   SubCutaneous at bedtime  insulin lispro Injectable (HumaLOG) 14 Unit(s) SubCutaneous three times a day before meals  latanoprost 0.005% Ophthalmic Solution 1 Drop(s) Both EYES at bedtime  metoprolol succinate  milliGRAM(s) Oral daily  nystatin Powder 1 Application(s) Topical two times a day      Vitals:  T(F): 98 (03-18), Max: 99.1 (03-17)  HR: 71 (03-18) (71 - 88)  BP: 115/73 (03-18) (111/86 - 130/74)  RR: 16 (03-18)  SpO2: 95% (03-18)  I&O's Summary    17 Mar 2019 07:01  -  18 Mar 2019 07:00  --------------------------------------------------------  IN: 1080 mL / OUT: 1500 mL / NET: -420 mL               Physical Exam:  Appearance: No acute distress; well appearing  HEENT:  EOMI, sclera anicteric, Normal oral mucosa  Cardiovascular: RRR, S1, S2, no murmurs, rubs, or gallops; no edema; no JVD, 1+ edema b/l  Respiratory: decreased breath sounds R base  Gastrointestinal: soft, non-tender, non-distended with normal bowel sounds  Musculoskeletal: No clubbing; no joint deformity   Neurologic: Non-focal  Lymphatic: No lymphadenopathy  Psychiatry: AAOx3, mood & affect appropriate  Skin: No rashes, ecchymoses, or cyanosis                           11.5   11.35 )-----------( 345      ( 17 Mar 2019 08:56 )             36.6     03-18    137  |  98  |  69<H>  ----------------------------<  128<H>  3.8   |  24  |  1.59<H>    Ca    9.5      18 Mar 2019 05:46  Phos  4.9     03-18  Mg     2.5     03-18    TPro  7.6  /  Alb  3.9  /  TBili  0.3  /  DBili  x   /  AST  21  /  ALT  17  /  AlkPhos  74  03-18    PT/INR - ( 16 Mar 2019 09:21 )   PT: 12.7 sec;   INR: 1.11 ratio         PTT - ( 16 Mar 2019 09:21 )  PTT:34.1 sec      Echo:  3/6/19  1. Decreased systolic function with a visually estimated left ventricular   ejection fraction of 25-30%.  2. Multiple regional wall motion abnormalities as described above.  3. LV diastolic dysfunction with evidence of elevated filling pressures.  4. Moderate posterior mitral annular calcification. Mild mitral valve   regurgitation.  5. Calcified trileaflet aortic valve. Moderate to severe aortic stenosis   and mild aortic valve insufficiency.  6. the right ventricle is grossly normal in size and systolic function.  7. Normal tricuspid valve with mild tricuspid valve regurgitation with   incomplete waveform.  8. Normal pulmonic valve with trace insufficiency.  9. Limited study to estimate pulmonary artery systolic pressure        Cath:  3/13/19  RHC /LHC MID LAD with IFR .6 80% occlusion, 70% to CX IFR .89  PCWP 19 LVEDP 40 CI 2.42 RV 42/8    Interpretation of Telemetry:  sinus rhythm 80s-90s

## 2019-03-18 NOTE — PROGRESS NOTE ADULT - ASSESSMENT
73yoF w/ PMHx significant for HTN, HLD, CAD, DM2 (on Lantus, glimepiride), asthma, glaucoma, uterine cancer s/p RT and MAYNOR-BSO presents as transfer from [facility] for NSTEMI, FLORIDALMA, acute hypoxic respiratory failure 2/2 acute decompensated heart failure, asthma exacerbation, and influenza A (resolved) s/p LHC 3/12 revealing 70% mLAD & 75% m LCx, and elevated LVEDP, no intervention. Started on diuresis. No s/p LHC through LFA w/t 1 TONY to mLCx. Plan for staged PCI to LAD today.

## 2019-03-18 NOTE — PROGRESS NOTE ADULT - SUBJECTIVE AND OBJECTIVE BOX
Patient is a 73y old  Female who presents with a chief complaint of NSTEMI, acute hypoxic respiratory failure, acute decompensated heart failure, FLORIDALMA, influenza A (18 Mar 2019 07:33)      SUBJECTIVE / OVERNIGHT EVENTS: Feels well, minimal mostly NP cough. Ready for cath.    Review of Systems:  Gen: negative for fevers, chills, malaise, fatigue  Eyes: no blurred vision  ENT: no vertigo  Chest: + cough. No wheezing, dyspnea, pleuritic chest pain, hemoptysis, or orthopnea  CV: no chest pain, dyspnea on exertion, or palpitations  GI: no nausea, vomiting, abdominal pain, diarrhea, constipation, melena, or hematochezia  : no dysuria or hematuria  MSK: no arthralgias or myalgias  Neuro: no focal deficits, confusion, weakness, or dizziness  Skin: no rash, lesions, or edema    MEDICATIONS  (STANDING):  ALBUTerol/ipratropium for Nebulization 3 milliLiter(s) Nebulizer every 6 hours  aspirin enteric coated 81 milliGRAM(s) Oral daily  atorvastatin 40 milliGRAM(s) Oral at bedtime  brimonidine 0.2% Ophthalmic Solution 1 Drop(s) Both EYES two times a day  clopidogrel Tablet 75 milliGRAM(s) Oral daily  dextrose 5%. 1000 milliLiter(s) (50 mL/Hr) IV Continuous <Continuous>  dextrose 50% Injectable 12.5 Gram(s) IV Push once  dextrose 50% Injectable 25 Gram(s) IV Push once  dextrose 50% Injectable 25 Gram(s) IV Push once  dorzolamide 2% Ophthalmic Solution 1 Drop(s) Both EYES three times a day  fluticasone propionate 50 MICROgram(s)/spray Nasal Spray 1 Spray(s) Both Nostrils two times a day  furosemide   Injectable 40 milliGRAM(s) IV Push daily  heparin  Injectable 5000 Unit(s) SubCutaneous every 12 hours  influenza   Vaccine 0.5 milliLiter(s) IntraMuscular once  insulin glargine Injectable (LANTUS) 28 Unit(s) SubCutaneous at bedtime  insulin lispro (HumaLOG) corrective regimen sliding scale   SubCutaneous three times a day before meals  insulin lispro (HumaLOG) corrective regimen sliding scale   SubCutaneous at bedtime  insulin lispro Injectable (HumaLOG) 14 Unit(s) SubCutaneous three times a day before meals  latanoprost 0.005% Ophthalmic Solution 1 Drop(s) Both EYES at bedtime  metoprolol succinate  milliGRAM(s) Oral daily  nystatin Powder 1 Application(s) Topical two times a day    MEDICATIONS  (PRN):  benzonatate 100 milliGRAM(s) Oral three times a day PRN Cough  dextrose 40% Gel 15 Gram(s) Oral once PRN Blood Glucose LESS THAN 70 milliGRAM(s)/deciliter  glucagon  Injectable 1 milliGRAM(s) IntraMuscular once PRN Glucose LESS THAN 70 milligrams/deciliter  guaiFENesin   Syrup  (Sugar-Free) 100 milliGRAM(s) Oral every 6 hours PRN Cough      Vital Signs Last 24 Hrs  T(C): 36.7 (18 Mar 2019 04:26), Max: 37.3 (17 Mar 2019 20:51)  T(F): 98 (18 Mar 2019 04:26), Max: 99.1 (17 Mar 2019 20:51)  HR: 71 (18 Mar 2019 04:26) (71 - 88)  BP: 115/73 (18 Mar 2019 04:26) (111/86 - 130/74)  BP(mean): --  RR: 16 (18 Mar 2019 04:26) (16 - 18)  SpO2: 95% (18 Mar 2019 04:26) (95% - 97%)  CAPILLARY BLOOD GLUCOSE      POCT Blood Glucose.: 150 mg/dL (18 Mar 2019 11:41)  POCT Blood Glucose.: 151 mg/dL (18 Mar 2019 07:20)  POCT Blood Glucose.: 170 mg/dL (17 Mar 2019 21:44)  POCT Blood Glucose.: 103 mg/dL (17 Mar 2019 17:11)    I&O's Summary    17 Mar 2019 07:01  -  18 Mar 2019 07:00  --------------------------------------------------------  IN: 1080 mL / OUT: 1500 mL / NET: -420 mL    18 Mar 2019 07:01  -  18 Mar 2019 12:55  --------------------------------------------------------  IN: 360 mL / OUT: 450 mL / NET: -90 mL        PHYSICAL EXAM:  GENERAL: NAD, well-developed  HEAD: Atraumatic  EYES: EOMI, PERRL, conjunctiva and sclera clear  NECK: supple, no JVD  CHEST/LUNG: clear to auscultation bilaterally. No wheezes, rales, or rhonchi  HEART: regular rate and rhythm. No murmurs, rubs, or gallops  ABDOMEN: soft, nontender, nondistended. Bowel sounds present  EXTREMITIES: 2+ peripheral pulses, no clubbing, cyanosis, or edema  NEUROLOGY: A&Ox3  SKIN: no rashes or lesions      LABS:                        12.0   12.04 )-----------( 347      ( 18 Mar 2019 08:33 )             37.7     03-18    137  |  98  |  69<H>  ----------------------------<  128<H>  3.8   |  24  |  1.59<H>    Ca    9.5      18 Mar 2019 05:46  Phos  4.9     03-18  Mg     2.5     03-18    TPro  7.6  /  Alb  3.9  /  TBili  0.3  /  DBili  x   /  AST  21  /  ALT  17  /  AlkPhos  74  03-18    PT/INR - ( 18 Mar 2019 09:40 )   PT: 12.4 sec;   INR: 1.09 ratio         PTT - ( 18 Mar 2019 09:40 )  PTT:33.4 sec          RADIOLOGY & ADDITIONAL TESTS:    Imaging Personally Reviewed: none    Consultant(s) Notes Reviewed: cardiology    Care Discussed with Consults/Other Providers: none

## 2019-03-18 NOTE — PROGRESS NOTE ADULT - NSICDXPROBLEM_GEN_ALL_CORE_FT
PROBLEM DIAGNOSES  Problem: NSTEMI (non-ST elevated myocardial infarction)  Assessment and Plan: NSTEMI (non-ST elevated myocardial infarction) s/p LHC w/t 70% mLAD and 75% mLCx, now s/p 1 TONY to LCx. Plan for staged PCI to LAD today  Plan:      - Cards recs:      - 40mg IV QD lasix    - strict I/Os, daily weights (standing)      - c/w ASA, plavix, statin    - c/w metoprolol succinate 100 daily      - Hold ARB 2/2 FLORIDALMA and plan for cath today     - Trend BMP, keep K>4, Mg>2      - Continue to monitor on telemetry.     Problem: Hypertension  Assessment and Plan: Hypertension.  Plan: - continue BB,     -hold ARB 2/2 floridalma and plan for staged pci today    Problem: Diabetes mellitus  Assessment and Plan: Problem: Type 2 diabetes mellitus with hypoglycemia without coma, with long-term current use of insulin.  Plan: A1c 6.8.     - Lantus 28u QHS    - premeal lispro 14u QAC TID    - moderate ISS    - FSG w/t meals and QHS.     Problem: FLORIDALMA (acute kidney injury)  Assessment and Plan: Plan: Baseline 1.06-1.21 per PCP office, Scr improving   - daily BMP     - strict I/Os, avoid nephrotoxins, renally dose meds    - continue to hold home oxybutinin as can cause retention.    Problem: Asthma exacerbation  Assessment and Plan: 2/2 influenza A, now resolved, s/p Tamiflu  Plan:   - supplemental oxygen as needed to maintain sat >92%, currently not requiring any    - Duoneb Q6H PRN wheeze.     Problem: Vulvovaginal candidiasis  Assessment and Plan: Improving     Plan: Vaginal erythema and pruritis likely 2/2 Vulvovaginitis - IMPROVED    - c/w nystatin powder at night   - Likely irritant dermatitis from urine.    Problem: Prophylactic measure  Assessment and Plan: Plan: VTE ppx: HSQ    Diet: DASH, CC

## 2019-03-18 NOTE — PROGRESS NOTE ADULT - ASSESSMENT
A/P: 73 year old F pt with PMH of HTN, HLD, CAD, DM2, and uterine cancer s/p MAYNOR-BSO presented as a transfer from [place] for NSTEMI in the setting of influenza A.  Found to have severe segmental LV dysfunction at OSH.  Currently feeling better from a FLU standpoint (off isolation).  Euvolemic, chest pain free.     #CAD  LHC on 3/12 with mLAD 80%, LCx 70%(both significant by iFR.) No intervention performed due to elevated LVEDP of 40. Now s/p PCI to LCx.  - cont dapt, atorvastatin 40 mg daily, metop succinate 100mg daily  - unable to obtain strict Is/Os given incontinence, will need daily weights  - creatinine remains stable 1.59, likely staged PCI to LAD today    #HFrEF  EF 25-30%. LHC as above  - continue metop succinate as above  - diuresis as above    Amrit Duckworth MD  Cardiology fellow  [phone]

## 2019-03-19 ENCOUNTER — TRANSCRIPTION ENCOUNTER (OUTPATIENT)
Age: 74
End: 2019-03-19

## 2019-03-19 VITALS
OXYGEN SATURATION: 95 % | DIASTOLIC BLOOD PRESSURE: 74 MMHG | SYSTOLIC BLOOD PRESSURE: 122 MMHG | TEMPERATURE: 98 F | HEART RATE: 84 BPM | RESPIRATION RATE: 17 BRPM

## 2019-03-19 LAB
ANION GAP SERPL CALC-SCNC: 15 MMOL/L — SIGNIFICANT CHANGE UP (ref 5–17)
BUN SERPL-MCNC: 76 MG/DL — HIGH (ref 7–23)
CALCIUM SERPL-MCNC: 9.8 MG/DL — SIGNIFICANT CHANGE UP (ref 8.4–10.5)
CHLORIDE SERPL-SCNC: 98 MMOL/L — SIGNIFICANT CHANGE UP (ref 96–108)
CO2 SERPL-SCNC: 23 MMOL/L — SIGNIFICANT CHANGE UP (ref 22–31)
CREAT SERPL-MCNC: 1.69 MG/DL — HIGH (ref 0.5–1.3)
GLUCOSE SERPL-MCNC: 160 MG/DL — HIGH (ref 70–99)
MAGNESIUM SERPL-MCNC: 2.8 MG/DL — HIGH (ref 1.6–2.6)
PHOSPHATE SERPL-MCNC: 5.1 MG/DL — HIGH (ref 2.5–4.5)
POTASSIUM SERPL-MCNC: 3.7 MMOL/L — SIGNIFICANT CHANGE UP (ref 3.5–5.3)
POTASSIUM SERPL-SCNC: 3.7 MMOL/L — SIGNIFICANT CHANGE UP (ref 3.5–5.3)
SODIUM SERPL-SCNC: 136 MMOL/L — SIGNIFICANT CHANGE UP (ref 135–145)

## 2019-03-19 PROCEDURE — 93572 IV DOP VEL&/PRESS C FLO EA: CPT | Mod: LC

## 2019-03-19 PROCEDURE — 93571 IV DOP VEL&/PRESS C FLO 1ST: CPT | Mod: LD

## 2019-03-19 PROCEDURE — C1753: CPT

## 2019-03-19 PROCEDURE — 76937 US GUIDE VASCULAR ACCESS: CPT

## 2019-03-19 PROCEDURE — 84484 ASSAY OF TROPONIN QUANT: CPT

## 2019-03-19 PROCEDURE — 81001 URINALYSIS AUTO W/SCOPE: CPT

## 2019-03-19 PROCEDURE — 84295 ASSAY OF SERUM SODIUM: CPT

## 2019-03-19 PROCEDURE — 97530 THERAPEUTIC ACTIVITIES: CPT

## 2019-03-19 PROCEDURE — 84100 ASSAY OF PHOSPHORUS: CPT

## 2019-03-19 PROCEDURE — 99153 MOD SED SAME PHYS/QHP EA: CPT

## 2019-03-19 PROCEDURE — C9600: CPT | Mod: LD

## 2019-03-19 PROCEDURE — 80053 COMPREHEN METABOLIC PANEL: CPT

## 2019-03-19 PROCEDURE — C1874: CPT

## 2019-03-19 PROCEDURE — 86900 BLOOD TYPING SEROLOGIC ABO: CPT

## 2019-03-19 PROCEDURE — 97116 GAIT TRAINING THERAPY: CPT

## 2019-03-19 PROCEDURE — C1769: CPT

## 2019-03-19 PROCEDURE — 82947 ASSAY GLUCOSE BLOOD QUANT: CPT

## 2019-03-19 PROCEDURE — 97162 PT EVAL MOD COMPLEX 30 MIN: CPT

## 2019-03-19 PROCEDURE — 84132 ASSAY OF SERUM POTASSIUM: CPT

## 2019-03-19 PROCEDURE — 85610 PROTHROMBIN TIME: CPT

## 2019-03-19 PROCEDURE — 82553 CREATINE MB FRACTION: CPT

## 2019-03-19 PROCEDURE — 84300 ASSAY OF URINE SODIUM: CPT

## 2019-03-19 PROCEDURE — C1760: CPT

## 2019-03-19 PROCEDURE — 83605 ASSAY OF LACTIC ACID: CPT

## 2019-03-19 PROCEDURE — 82570 ASSAY OF URINE CREATININE: CPT

## 2019-03-19 PROCEDURE — 99152 MOD SED SAME PHYS/QHP 5/>YRS: CPT

## 2019-03-19 PROCEDURE — 99239 HOSP IP/OBS DSCHRG MGMT >30: CPT

## 2019-03-19 PROCEDURE — 84540 ASSAY OF URINE/UREA-N: CPT

## 2019-03-19 PROCEDURE — 82330 ASSAY OF CALCIUM: CPT

## 2019-03-19 PROCEDURE — 83735 ASSAY OF MAGNESIUM: CPT

## 2019-03-19 PROCEDURE — C1725: CPT

## 2019-03-19 PROCEDURE — 82435 ASSAY OF BLOOD CHLORIDE: CPT

## 2019-03-19 PROCEDURE — 93460 R&L HRT ART/VENTRICLE ANGIO: CPT

## 2019-03-19 PROCEDURE — C1887: CPT

## 2019-03-19 PROCEDURE — 86850 RBC ANTIBODY SCREEN: CPT

## 2019-03-19 PROCEDURE — 93005 ELECTROCARDIOGRAM TRACING: CPT

## 2019-03-19 PROCEDURE — 85730 THROMBOPLASTIN TIME PARTIAL: CPT

## 2019-03-19 PROCEDURE — 99233 SBSQ HOSP IP/OBS HIGH 50: CPT | Mod: GC

## 2019-03-19 PROCEDURE — 85014 HEMATOCRIT: CPT

## 2019-03-19 PROCEDURE — 82962 GLUCOSE BLOOD TEST: CPT

## 2019-03-19 PROCEDURE — 94640 AIRWAY INHALATION TREATMENT: CPT

## 2019-03-19 PROCEDURE — 85027 COMPLETE CBC AUTOMATED: CPT

## 2019-03-19 PROCEDURE — 80048 BASIC METABOLIC PNL TOTAL CA: CPT

## 2019-03-19 PROCEDURE — C1894: CPT

## 2019-03-19 PROCEDURE — 86901 BLOOD TYPING SEROLOGIC RH(D): CPT

## 2019-03-19 PROCEDURE — 97110 THERAPEUTIC EXERCISES: CPT

## 2019-03-19 PROCEDURE — 92978 ENDOLUMINL IVUS OCT C 1ST: CPT | Mod: LD

## 2019-03-19 PROCEDURE — 82803 BLOOD GASES ANY COMBINATION: CPT

## 2019-03-19 PROCEDURE — 82550 ASSAY OF CK (CPK): CPT

## 2019-03-19 RX ORDER — LOSARTAN POTASSIUM 100 MG/1
1 TABLET, FILM COATED ORAL
Qty: 0 | Refills: 0 | COMMUNITY

## 2019-03-19 RX ORDER — POTASSIUM CHLORIDE 20 MEQ
40 PACKET (EA) ORAL ONCE
Qty: 0 | Refills: 0 | Status: COMPLETED | OUTPATIENT
Start: 2019-03-19 | End: 2019-03-19

## 2019-03-19 RX ORDER — METOPROLOL TARTRATE 50 MG
1 TABLET ORAL
Qty: 30 | Refills: 0
Start: 2019-03-19 | End: 2019-04-17

## 2019-03-19 RX ORDER — ASPIRIN/CALCIUM CARB/MAGNESIUM 324 MG
1 TABLET ORAL
Qty: 0 | Refills: 0 | COMMUNITY

## 2019-03-19 RX ORDER — FUROSEMIDE 40 MG
1 TABLET ORAL
Qty: 0 | Refills: 0 | DISCHARGE
Start: 2019-03-19 | End: 2019-04-02

## 2019-03-19 RX ORDER — FUROSEMIDE 40 MG
1 TABLET ORAL
Qty: 15 | Refills: 0
Start: 2019-03-19 | End: 2019-04-02

## 2019-03-19 RX ORDER — ASPIRIN/CALCIUM CARB/MAGNESIUM 324 MG
1 TABLET ORAL
Qty: 90 | Refills: 0
Start: 2019-03-19 | End: 2019-06-16

## 2019-03-19 RX ORDER — CLOPIDOGREL BISULFATE 75 MG/1
1 TABLET, FILM COATED ORAL
Qty: 30 | Refills: 0
Start: 2019-03-19 | End: 2019-04-17

## 2019-03-19 RX ORDER — FUROSEMIDE 40 MG
0.5 TABLET ORAL
Qty: 0 | Refills: 0 | DISCHARGE
Start: 2019-03-19 | End: 2019-04-02

## 2019-03-19 RX ADMIN — DORZOLAMIDE HYDROCHLORIDE 1 DROP(S): 20 SOLUTION/ DROPS OPHTHALMIC at 05:43

## 2019-03-19 RX ADMIN — HEPARIN SODIUM 5000 UNIT(S): 5000 INJECTION INTRAVENOUS; SUBCUTANEOUS at 05:43

## 2019-03-19 RX ADMIN — Medication 40 MILLIEQUIVALENT(S): at 08:29

## 2019-03-19 RX ADMIN — Medication 2: at 08:27

## 2019-03-19 RX ADMIN — Medication 81 MILLIGRAM(S): at 12:47

## 2019-03-19 RX ADMIN — Medication 1 SPRAY(S): at 05:44

## 2019-03-19 RX ADMIN — NYSTATIN CREAM 1 APPLICATION(S): 100000 CREAM TOPICAL at 05:44

## 2019-03-19 RX ADMIN — BRIMONIDINE TARTRATE 1 DROP(S): 2 SOLUTION/ DROPS OPHTHALMIC at 05:43

## 2019-03-19 RX ADMIN — Medication 4: at 12:24

## 2019-03-19 RX ADMIN — Medication 100 MILLIGRAM(S): at 05:43

## 2019-03-19 RX ADMIN — CLOPIDOGREL BISULFATE 75 MILLIGRAM(S): 75 TABLET, FILM COATED ORAL at 12:47

## 2019-03-19 RX ADMIN — Medication 14 UNIT(S): at 12:25

## 2019-03-19 RX ADMIN — Medication 40 MILLIGRAM(S): at 05:43

## 2019-03-19 RX ADMIN — Medication 14 UNIT(S): at 08:27

## 2019-03-19 RX ADMIN — Medication 3 MILLILITER(S): at 05:44

## 2019-03-19 NOTE — PROGRESS NOTE ADULT - NSICDXPROBLEM_GEN_ALL_CORE_FT
PROBLEM DIAGNOSES  Problem: NSTEMI (non-ST elevated myocardial infarction)  Assessment and Plan: NSTEMI (non-ST elevated myocardial infarction) s/p LHC w/t 70% mLAD and 75% mLCx, now s/p 1 TONY to LCx. Now s/p 2 TONY to mLAD on 3/18.  Plan:  - Cards recs:  - 40mg IV BID lasix  -strict I/Os, daily weights (standing)  - c/w ASA, plavix,   - c/w metoprolol succinate 100 daily  - c/w statin  - Hold ARB 2/2 FLORIDALMA and plan for cath  - Trend BMP, keep K>4, Mg>2  - Continue to monitor on telemetry.     Problem: Acute decompensated heart failure  Assessment and Plan:  Acute systolic heart failure.  Plan: 2/2 NSTEMI, mod-severe AS, and asthma exacerbation, required BIPAP at OSH, now on RA   NYHA 3, ACC/AHA C  - c/w 40mg IV lasix bid   -Strict I/Os, daily weights (standing)  -metop succ 100mg QD    Problem: Acute respiratory failure with hypoxia  Assessment and Plan: Problem: Acute respiratory failure with hypoxia.  Plan: 2/2 ADHF 2/2 flu A + c/b AS (LVEDP 40)  - 2/2 pulmonary edema, asthma exacerbation, and influenza A.   - currently off of any O2 and satting well  - Management as above for HF and asthma exacerbation  - Duonebs Q6H PRN wheeze.     Problem: Hypertension  Assessment and Plan: Hypertension.  Plan: - continue BB,   -hold ARB 2/2 floridalma and plan for staged pci     Problem: FLORIDALMA (acute kidney injury)  Assessment and Plan: Problem: FLORIDALMA (acute kidney injury).  Plan: Baseline 1.06-1.21 per PCP office   - daily BMP   - strict I/Os, avoid nephrotoxins, renally dose meds  - continue to hold home oxybutin as can cause retention.    Problem: Vulvovaginal candidiasis  Assessment and Plan: Improving   Plan: Vaginal erythema and pruritis likely 2/2 Vulvovaginitis - IMPROVED  - c/w nystatin powder overnight   - Likely irritant dermatitis from urine.    Problem: Diabetes mellitus  Assessment and Plan: Problem: Type 2 diabetes mellitus with hypoglycemia without coma, with long-term current use of insulin. Needed 8u additional yesterday  Plan: A1c 6.8.   - Lantus 28u QHS  - premeal lispro 14u QAC TID  - moderate ISS  - FSG w/t meals and QHS.     Problem: Asthma exacerbation  Assessment and Plan:  Problem/Plan - 8:  ·  Problem: Asthma exacerbation.  Plan: 2/2 influenza A infection  - supplemental oxygen as needed to maintain sat >92%  - Duoneb Q6H PRN wheeze.     Problem: Prophylactic measure  Assessment and Plan: Plan; VTE ppx: HSQ  Diet: DASH, CC PROBLEM DIAGNOSES  Problem: NSTEMI (non-ST elevated myocardial infarction)  Assessment and Plan: NSTEMI (non-ST elevated myocardial infarction) s/p LHC w/t 70% mLAD and 75% mLCx, now s/p 1 TONY to LCx. Now s/p 2 TOYN to mLAD on 3/18.  Plan:  - Cards recs:  - 40mg IV BID lasix  -strict I/Os, daily weights (standing)  - c/w ASA, plavix,   - c/w metoprolol succinate 100 daily  - c/w statin  - Hold ARB 2/2 FLORIDALMA and plan for cath  - Trend BMP, keep K>4, Mg>2  - Continue to monitor on telemetry.     Problem: Acute decompensated heart failure  Assessment and Plan:  Acute systolic heart failure.  Plan: 2/2 NSTEMI, mod-severe AS, and asthma exacerbation, required BIPAP at OSH, now on RA   NYHA 3, ACC/AHA C  - c/w 40mg IV lasix bid   -Strict I/Os, daily weights (standing)  -metop succ 100mg QD    Problem: Acute respiratory failure with hypoxia  Assessment and Plan: Problem: Acute respiratory failure with hypoxia.  Plan: 2/2 ADHF 2/2 flu A + c/b AS (LVEDP 40)  - 2/2 pulmonary edema, asthma exacerbation, and influenza A.   - currently off of any O2 and satting well  - Management as above for HF and asthma exacerbation  - Duonebs Q6H PRN wheeze.     Problem: Hypertension  Assessment and Plan: Hypertension.  Plan: - continue BB,   -hold ARB 2/2 floridalma and plan for staged pci     Problem: FLORIDALMA (acute kidney injury)  Assessment and Plan: Problem: FLORIDALMA (acute kidney injury).  Plan: Baseline 1.06-1.21 per PCP office   -Cr bump s/p cath as expected today  - daily BMP   - strict I/Os, avoid nephrotoxins, renally dose meds  - continue to hold home oxybutin as can cause retention.    Problem: Vulvovaginal candidiasis  Assessment and Plan: Improving   Plan: Vaginal erythema and pruritis likely 2/2 Vulvovaginitis - IMPROVED  - c/w nystatin powder overnight   - Likely irritant dermatitis from urine.    Problem: Diabetes mellitus  Assessment and Plan: Problem: Type 2 diabetes mellitus with hypoglycemia without coma, with long-term current use of insulin. Needed 8u additional yesterday  Plan: A1c 6.8.   - Lantus 28u QHS  - premeal lispro 14u QAC TID  - moderate ISS  - FSG w/t meals and QHS.     Problem: Asthma exacerbation  Assessment and Plan:  Problem/Plan - 8:  ·  Problem: Asthma exacerbation.  Plan: 2/2 influenza A infection  - supplemental oxygen as needed to maintain sat >92%  - Duoneb Q6H PRN wheeze.     Problem: Prophylactic measure  Assessment and Plan: Plan; VTE ppx: HSQ  Diet: DASH, CC PROBLEM DIAGNOSES  Problem: NSTEMI (non-ST elevated myocardial infarction)  Assessment and Plan: NSTEMI (non-ST elevated myocardial infarction) s/p LHC w/t 70% mLAD and 75% mLCx, now s/p 1 TONY to LCx. Now s/p 2 TONY to mLAD on 3/18.  Plan:  - Cards recs:  - 40mg IV BID lasix  -strict I/Os, daily weights (standing)  - c/w ASA, plavix,   - c/w metoprolol succinate 100 daily  - c/w statin  - Hold ARB 2/2 FLORIDALMA and plan for cath  - Trend BMP, keep K>4, Mg>2  - Continue to monitor on telemetry.     Problem: Acute decompensated heart failure  Assessment and Plan:  Acute systolic heart failure.  Plan: 2/2 NSTEMI, mod-severe AS, and asthma exacerbation, required BIPAP at OSH, now on RA   NYHA 3, ACC/AHA C  - c/w 40mg IV lasix bid   -Strict I/Os, daily weights (standing)  -metop succ 100mg QD    Problem: Acute respiratory failure with hypoxia  Assessment and Plan: Problem: Acute respiratory failure with hypoxia.  Plan: 2/2 ADHF 2/2 flu A + c/b AS (LVEDP 40)  - 2/2 pulmonary edema, asthma exacerbation, and influenza A.   - currently off of any O2 and satting well  - Management as above for HF and asthma exacerbation  - Duonebs Q6H PRN wheeze.     Problem: Hypertension  Assessment and Plan: Hypertension.  Plan: - continue BB,   -hold ARB 2/2 floridalma and plan for staged pci     Problem: FLORIDLAMA (acute kidney injury)  Assessment and Plan: Problem: FLORIDALMA (acute kidney injury).  Plan: Baseline 1.06-1.21 per PCP office   -Cr bump s/p cath as expected today  - daily BMP   - strict I/Os, avoid nephrotoxins, renally dose meds  - continue to hold home oxybutin as can cause retention.    Problem: Vulvovaginal candidiasis  Assessment and Plan: Improving   Plan: Vaginal erythema and pruritis likely 2/2 Vulvovaginitis - IMPROVED  - c/w nystatin powder overnight   - Likely irritant dermatitis from urine.    Problem: Diabetes mellitus  Assessment and Plan: Problem: Type 2 diabetes mellitus with hypoglycemia without coma, with long-term current use of insulin. Needed 8u additional yesterday  Plan: A1c 6.8.   - Lantus 28u QHS  - premeal lispro 14u QAC TID  - moderate ISS  - FSG w/t meals and QHS.     Problem: Asthma exacerbation  Assessment and Plan:  Problem/Plan - 8:  ·  Problem: Asthma exacerbation.  Plan: 2/2 influenza A infection  - supplemental oxygen as needed to maintain sat >92%  - Duoneb Q6H PRN wheeze.     Problem: Prophylactic measure  Assessment and Plan: Plan; VTE ppx: HSQ  Diet: DASH, CC

## 2019-03-19 NOTE — PROGRESS NOTE ADULT - SUBJECTIVE AND OBJECTIVE BOX
Dr. Ren Fierro   Internal Medicine PGY-1  Pager #[phone]    Patient is a 73y old  Female who presents with a chief complaint of NSTEMI, acute hypoxic respiratory failure, acute decompensated heart failure, FLORIDALMA, influenza A (18 Mar 2019 12:55)      SUBJECTIVE / OVERNIGHT EVENTS:  DIAN ON. S/p 2 TONY to the mLAD. No SOB, CP, or abdominal pain. No palpitations.     MEDICATIONS  (STANDING):  ALBUTerol/ipratropium for Nebulization 3 milliLiter(s) Nebulizer every 6 hours  aspirin enteric coated 81 milliGRAM(s) Oral daily  atorvastatin 40 milliGRAM(s) Oral at bedtime  brimonidine 0.2% Ophthalmic Solution 1 Drop(s) Both EYES two times a day  clopidogrel Tablet 75 milliGRAM(s) Oral daily  dextrose 5%. 1000 milliLiter(s) (50 mL/Hr) IV Continuous <Continuous>  dextrose 50% Injectable 12.5 Gram(s) IV Push once  dextrose 50% Injectable 25 Gram(s) IV Push once  dextrose 50% Injectable 25 Gram(s) IV Push once  dorzolamide 2% Ophthalmic Solution 1 Drop(s) Both EYES three times a day  fluticasone propionate 50 MICROgram(s)/spray Nasal Spray 1 Spray(s) Both Nostrils two times a day  furosemide   Injectable 40 milliGRAM(s) IV Push daily  heparin  Injectable 5000 Unit(s) SubCutaneous every 12 hours  influenza   Vaccine 0.5 milliLiter(s) IntraMuscular once  insulin glargine Injectable (LANTUS) 28 Unit(s) SubCutaneous at bedtime  insulin lispro (HumaLOG) corrective regimen sliding scale   SubCutaneous three times a day before meals  insulin lispro (HumaLOG) corrective regimen sliding scale   SubCutaneous at bedtime  insulin lispro Injectable (HumaLOG) 14 Unit(s) SubCutaneous three times a day before meals  latanoprost 0.005% Ophthalmic Solution 1 Drop(s) Both EYES at bedtime  metoprolol succinate  milliGRAM(s) Oral daily  nystatin Powder 1 Application(s) Topical two times a day    MEDICATIONS  (PRN):  benzonatate 100 milliGRAM(s) Oral three times a day PRN Cough  dextrose 40% Gel 15 Gram(s) Oral once PRN Blood Glucose LESS THAN 70 milliGRAM(s)/deciliter  glucagon  Injectable 1 milliGRAM(s) IntraMuscular once PRN Glucose LESS THAN 70 milligrams/deciliter  guaiFENesin   Syrup  (Sugar-Free) 100 milliGRAM(s) Oral every 6 hours PRN Cough      Vital Signs Last 24 Hrs  T(C): 36.7 (19 Mar 2019 04:39), Max: 37 (18 Mar 2019 20:49)  T(F): 98 (19 Mar 2019 04:39), Max: 98.6 (18 Mar 2019 20:49)  HR: 80 (19 Mar 2019 04:39) (77 - 92)  BP: 120/68 (19 Mar 2019 04:39) (90/53 - 131/72)  BP(mean): --  RR: 16 (19 Mar 2019 04:39) (14 - 18)  SpO2: 94% (19 Mar 2019 04:39) (94% - 96%)  CAPILLARY BLOOD GLUCOSE      POCT Blood Glucose.: 126 mg/dL (18 Mar 2019 21:27)  POCT Blood Glucose.: 146 mg/dL (18 Mar 2019 17:19)  POCT Blood Glucose.: 191 mg/dL (18 Mar 2019 14:58)  POCT Blood Glucose.: 150 mg/dL (18 Mar 2019 11:41)  POCT Blood Glucose.: 151 mg/dL (18 Mar 2019 07:20)    I&O's Summary    17 Mar 2019 07:01  -  18 Mar 2019 07:00  --------------------------------------------------------  IN: 1080 mL / OUT: 1500 mL / NET: -420 mL    18 Mar 2019 07:01  -  19 Mar 2019 06:30  --------------------------------------------------------  IN: 720 mL / OUT: 1025 mL / NET: -305 mL        PHYSICAL EXAM:  GENERAL: NAD, well-developed  HEAD:  Atraumatic, Normocephalic  EYES: EOMI, PERRLA, conjunctiva and sclera clear  NECK: Supple, No JVD  CHEST/LUNG: Clear to auscultation bilaterally; No wheeze  HEART: Regular rate and rhythm; No murmurs, rubs, or gallops  ABDOMEN: Soft, Nontender, Nondistended; Bowel sounds present  EXTREMITIES:  2+ Peripheral Pulses, No clubbing, cyanosis, or edema  PSYCH: AAOx3  NEUROLOGY: non-focal  SKIN: No rashes or lesions    LABS:                        12.0   12.04 )-----------( 347      ( 18 Mar 2019 08:33 )             37.7     03-18    137  |  98  |  69<H>  ----------------------------<  128<H>  3.8   |  24  |  1.59<H>    Ca    9.5      18 Mar 2019 05:46  Phos  4.9     03-18  Mg     2.5     03-18    TPro  7.6  /  Alb  3.9  /  TBili  0.3  /  DBili  x   /  AST  21  /  ALT  17  /  AlkPhos  74  03-18    PT/INR - ( 18 Mar 2019 09:40 )   PT: 12.4 sec;   INR: 1.09 ratio         PTT - ( 18 Mar 2019 09:40 )  PTT:33.4 sec          RADIOLOGY & ADDITIONAL TESTS:    Imaging Personally Reviewed:    Consultant(s) Notes Reviewed:      Care Discussed with Consultants/Other Providers: Dr. Ren Fierro   Internal Medicine PGY-1  Pager #[phone]    Patient is a 73y old  Female who presents with a chief complaint of NSTEMI, acute hypoxic respiratory failure, acute decompensated heart failure, FLORIDALMA, influenza A (18 Mar 2019 12:55)      SUBJECTIVE / OVERNIGHT EVENTS:  DIAN ON. S/p 2 TONY to the mLAD. No SOB, CP, or abdominal pain. No palpitations. SCr elevated to 1.69.    MEDICATIONS  (STANDING):  ALBUTerol/ipratropium for Nebulization 3 milliLiter(s) Nebulizer every 6 hours  aspirin enteric coated 81 milliGRAM(s) Oral daily  atorvastatin 40 milliGRAM(s) Oral at bedtime  brimonidine 0.2% Ophthalmic Solution 1 Drop(s) Both EYES two times a day  clopidogrel Tablet 75 milliGRAM(s) Oral daily  dextrose 5%. 1000 milliLiter(s) (50 mL/Hr) IV Continuous <Continuous>  dextrose 50% Injectable 12.5 Gram(s) IV Push once  dextrose 50% Injectable 25 Gram(s) IV Push once  dextrose 50% Injectable 25 Gram(s) IV Push once  dorzolamide 2% Ophthalmic Solution 1 Drop(s) Both EYES three times a day  fluticasone propionate 50 MICROgram(s)/spray Nasal Spray 1 Spray(s) Both Nostrils two times a day  furosemide   Injectable 40 milliGRAM(s) IV Push daily  heparin  Injectable 5000 Unit(s) SubCutaneous every 12 hours  influenza   Vaccine 0.5 milliLiter(s) IntraMuscular once  insulin glargine Injectable (LANTUS) 28 Unit(s) SubCutaneous at bedtime  insulin lispro (HumaLOG) corrective regimen sliding scale   SubCutaneous three times a day before meals  insulin lispro (HumaLOG) corrective regimen sliding scale   SubCutaneous at bedtime  insulin lispro Injectable (HumaLOG) 14 Unit(s) SubCutaneous three times a day before meals  latanoprost 0.005% Ophthalmic Solution 1 Drop(s) Both EYES at bedtime  metoprolol succinate  milliGRAM(s) Oral daily  nystatin Powder 1 Application(s) Topical two times a day    MEDICATIONS  (PRN):  benzonatate 100 milliGRAM(s) Oral three times a day PRN Cough  dextrose 40% Gel 15 Gram(s) Oral once PRN Blood Glucose LESS THAN 70 milliGRAM(s)/deciliter  glucagon  Injectable 1 milliGRAM(s) IntraMuscular once PRN Glucose LESS THAN 70 milligrams/deciliter  guaiFENesin   Syrup  (Sugar-Free) 100 milliGRAM(s) Oral every 6 hours PRN Cough      Vital Signs Last 24 Hrs  T(C): 36.7 (19 Mar 2019 04:39), Max: 37 (18 Mar 2019 20:49)  T(F): 98 (19 Mar 2019 04:39), Max: 98.6 (18 Mar 2019 20:49)  HR: 80 (19 Mar 2019 04:39) (77 - 92)  BP: 120/68 (19 Mar 2019 04:39) (90/53 - 131/72)  BP(mean): --  RR: 16 (19 Mar 2019 04:39) (14 - 18)  SpO2: 94% (19 Mar 2019 04:39) (94% - 96%)  CAPILLARY BLOOD GLUCOSE      POCT Blood Glucose.: 126 mg/dL (18 Mar 2019 21:27)  POCT Blood Glucose.: 146 mg/dL (18 Mar 2019 17:19)  POCT Blood Glucose.: 191 mg/dL (18 Mar 2019 14:58)  POCT Blood Glucose.: 150 mg/dL (18 Mar 2019 11:41)  POCT Blood Glucose.: 151 mg/dL (18 Mar 2019 07:20)    I&O's Summary    17 Mar 2019 07:01  -  18 Mar 2019 07:00  --------------------------------------------------------  IN: 1080 mL / OUT: 1500 mL / NET: -420 mL    18 Mar 2019 07:01  -  19 Mar 2019 06:30  --------------------------------------------------------  IN: 720 mL / OUT: 1025 mL / NET: -305 mL        PHYSICAL EXAM:  GENERAL: NAD, well-developed  HEAD:  Atraumatic, Normocephalic  EYES: EOMI, PERRLA, conjunctiva and sclera clear  NECK: Supple, No JVD  CHEST/LUNG: Clear to auscultation bilaterally; No wheeze  HEART: Regular rate and rhythm; No murmurs, rubs, or gallops  ABDOMEN: Soft, Nontender, Nondistended; Bowel sounds present  EXTREMITIES:  2+ Peripheral Pulses, No clubbing, cyanosis, or edema  PSYCH: AAOx3  NEUROLOGY: non-focal  SKIN: No rashes or lesions    LABS:                        12.0   12.04 )-----------( 347      ( 18 Mar 2019 08:33 )             37.7     03-18    137  |  98  |  69<H>  ----------------------------<  128<H>  3.8   |  24  |  1.59<H>    Ca    9.5      18 Mar 2019 05:46  Phos  4.9     03-18  Mg     2.5     03-18    TPro  7.6  /  Alb  3.9  /  TBili  0.3  /  DBili  x   /  AST  21  /  ALT  17  /  AlkPhos  74  03-18    PT/INR - ( 18 Mar 2019 09:40 )   PT: 12.4 sec;   INR: 1.09 ratio         PTT - ( 18 Mar 2019 09:40 )  PTT:33.4 sec          RADIOLOGY & ADDITIONAL TESTS:    Imaging Personally Reviewed:    Consultant(s) Notes Reviewed:      Care Discussed with Consultants/Other Providers:

## 2019-03-19 NOTE — PROGRESS NOTE ADULT - PROVIDER SPECIALTY LIST ADULT
Cardiology
Internal Medicine
Cardiology
Internal Medicine

## 2019-03-19 NOTE — DISCHARGE NOTE NURSING/CASE MANAGEMENT/SOCIAL WORK - NSDCPEPT PROEDHF_GEN_ALL_CORE
Low salt diet/Activities as tolerated/Monitor weight daily/Report signs and symptoms to primary care provider/Call primary care provider for follow up after discharge

## 2019-03-19 NOTE — PROGRESS NOTE ADULT - ASSESSMENT
73yoF w/ PMHx significant for HTN, HLD, CAD, DM2 (on Lantus, glimepiride), asthma, glaucoma, uterine cancer s/p RT and MAYNOR-BSO presents as transfer from [facility] for NSTEMI, FLORIDALMA, acute hypoxic respiratory failure 2/2 acute decompensated heart failure, asthma exacerbation, and influenza A (resolved) s/p LHC 3/12 revealing 70% mLAD & 75% m LCx, and elevated LVEDP, no intervention. Started on diuresis. No s/p LHC through LFA w/t 1 TONY to mLCx. Now s/p 2 TONY to the mLAD on 3/18. 73yoF w/ PMHx significant for HTN, HLD, CAD, DM2 (on Lantus, glimepiride), asthma, glaucoma, uterine cancer s/p RT and MAYNOR-BSO presents as transfer from [facility] for NSTEMI, FLORIDALMA, acute hypoxic respiratory failure 2/2 acute decompensated heart failure, asthma exacerbation, and influenza A (resolved) s/p LHC 3/12 revealing 70% mLAD & 75% m LCx, and elevated LVEDP, no intervention. Started on diuresis. No s/p LHC through LFA w/t 1 TONY to mLCx. Now s/p 2 TONY to the mLAD on 3/18. Anticipated rise in Cr 2/2 LHC.

## 2019-03-19 NOTE — DISCHARGE NOTE NURSING/CASE MANAGEMENT/SOCIAL WORK - NSDCDPATPORTLINK_GEN_ALL_CORE
You can access the [facility] Patient Portal, offered by [facility], by registering with the following website: [email or web address]

## 2019-03-19 NOTE — PROGRESS NOTE ADULT - SUBJECTIVE AND OBJECTIVE BOX
Patient seen and examined at bedside.    Overnight Events: s/p staged PCI to LAD, tolerated well, feels well.    Review Of Systems:   CONSTITUTIONAL: weakness improved, no diaphoresis and cough improving  EYES/ENT: No visual changes;  No dysphagia  NECK: No pain or stiffness  RESPIRATORY: cough, SOB improving, no wheezing, no hemoptysis  CARDIOVASCULAR: No chest pain or palpitations; No lower extremity edema  GASTROINTESTINAL: No abdominal or epigastric pain. No nausea, vomiting, or hematemesis; No diarrhea or constipation. No melena or hematochezia.  BACK: No back pain  GENITOURINARY: No dysuria, frequency or hematuria  NEUROLOGICAL: No numbness or weakness  SKIN: No itching, burning, rashes, or lesions            Current Meds:  ALBUTerol/ipratropium for Nebulization 3 milliLiter(s) Nebulizer every 6 hours  aspirin enteric coated 81 milliGRAM(s) Oral daily  atorvastatin 40 milliGRAM(s) Oral at bedtime  benzonatate 100 milliGRAM(s) Oral three times a day PRN  brimonidine 0.2% Ophthalmic Solution 1 Drop(s) Both EYES two times a day  clopidogrel Tablet 75 milliGRAM(s) Oral daily  dextrose 40% Gel 15 Gram(s) Oral once PRN  dextrose 5%. 1000 milliLiter(s) IV Continuous <Continuous>  dextrose 50% Injectable 12.5 Gram(s) IV Push once  dextrose 50% Injectable 25 Gram(s) IV Push once  dextrose 50% Injectable 25 Gram(s) IV Push once  dorzolamide 2% Ophthalmic Solution 1 Drop(s) Both EYES three times a day  fluticasone propionate 50 MICROgram(s)/spray Nasal Spray 1 Spray(s) Both Nostrils two times a day  furosemide   Injectable 40 milliGRAM(s) IV Push daily  glucagon  Injectable 1 milliGRAM(s) IntraMuscular once PRN  guaiFENesin   Syrup  (Sugar-Free) 100 milliGRAM(s) Oral every 6 hours PRN  heparin  Injectable 5000 Unit(s) SubCutaneous every 12 hours  influenza   Vaccine 0.5 milliLiter(s) IntraMuscular once  insulin glargine Injectable (LANTUS) 28 Unit(s) SubCutaneous at bedtime  insulin lispro (HumaLOG) corrective regimen sliding scale   SubCutaneous three times a day before meals  insulin lispro (HumaLOG) corrective regimen sliding scale   SubCutaneous at bedtime  insulin lispro Injectable (HumaLOG) 14 Unit(s) SubCutaneous three times a day before meals  latanoprost 0.005% Ophthalmic Solution 1 Drop(s) Both EYES at bedtime  metoprolol succinate  milliGRAM(s) Oral daily  nystatin Powder 1 Application(s) Topical two times a day  potassium chloride    Tablet ER 40 milliEquivalent(s) Oral once      Vitals:  T(F): 98 (03-19), Max: 98.6 (03-18)  HR: 80 (03-19) (77 - 92)  BP: 120/68 (03-19) (90/53 - 131/72)  RR: 16 (03-19)  SpO2: 94% (03-19)  I&O's Summary    18 Mar 2019 07:01  -  19 Mar 2019 07:00  --------------------------------------------------------  IN: 720 mL / OUT: 1025 mL / NET: -305 mL      Physical Exam:  Appearance: No acute distress; well appearing  HEENT:  EOMI, sclera anicteric, Normal oral mucosa  Cardiovascular: RRR, S1, S2, no murmurs, rubs, or gallops; no edema; no JVD, right radial site with 2+ pulse, no hematoma, cap refill < 2 seconds  Respiratory: CTAB, no wheezes, rales, rhonchi  Gastrointestinal: soft, non-tender, non-distended with normal bowel sounds  Musculoskeletal: No clubbing; no joint deformity   Neurologic: Non-focal  Lymphatic: No lymphadenopathy  Psychiatry: AAOx3, mood & affect appropriate  Skin: No rashes, ecchymoses, or cyanosis                           12.0   12.04 )-----------( 347      ( 18 Mar 2019 08:33 )             37.7     03-19    136  |  98  |  76<H>  ----------------------------<  160<H>  3.7   |  23  |  1.69<H>    Ca    9.8      19 Mar 2019 05:56  Phos  5.1     03-19  Mg     2.8     03-19    TPro  7.6  /  Alb  3.9  /  TBili  0.3  /  DBili  x   /  AST  21  /  ALT  17  /  AlkPhos  74  03-18    PT/INR - ( 18 Mar 2019 09:40 )   PT: 12.4 sec;   INR: 1.09 ratio         PTT - ( 18 Mar 2019 09:40 )  PTT:33.4 sec        Echo:  3/6/19  1. Decreased systolic function with a visually estimated left ventricular   ejection fraction of 25-30%.  2. Multiple regional wall motion abnormalities as described above.  3. LV diastolic dysfunction with evidence of elevated filling pressures.  4. Moderate posterior mitral annular calcification. Mild mitral valve   regurgitation.  5. Calcified trileaflet aortic valve. Moderate to severe aortic stenosis   and mild aortic valve insufficiency.  6. the right ventricle is grossly normal in size and systolic function.  7. Normal tricuspid valve with mild tricuspid valve regurgitation with   incomplete waveform.  8. Normal pulmonic valve with trace insufficiency.  9. Limited study to estimate pulmonary artery systolic pressure        Cath:  3/13/19  RHC /LHC MID LAD with IFR .6 80% occlusion, 70% to CX IFR .89  PCWP 19 LVEDP 40 CI 2.42 RV 42/8    3/15/19  CORONARY VESSELS:  CX:   --  Proximal circumflex: There was a tubular 80 % stenosis.  COMPLICATIONS: There were no complications.  INTERVENTIONAL IMPRESSIONS: Successful PCI to the Lcx. EDP checked at  conclusion of procedure and was 10mmHg  INTERVENTIONAL RECOMMENDATIONS: Aspirin and Plavix. Staged PCI to the LAD  given CRI, CLR<30      Interpretation of Telemetry:  sinus rhythm 80s-90s

## 2019-03-19 NOTE — PROGRESS NOTE ADULT - ASSESSMENT
A/P: 73 year old F pt with PMH of HTN, HLD, CAD, DM2, and uterine cancer s/p MAYNOR-BSO presented as a transfer from [place] for NSTEMI in the setting of influenza A.  Found to have severe segmental LV dysfunction at OSH.  Currently feeling better from a FLU standpoint (off isolation).  Euvolemic, chest pain free.     #CAD  LHC on 3/12 with mLAD 80%, LCx 70%(both significant by iFR.) No intervention performed due to elevated LVEDP of 40. Now s/p PCI to LCx and staged PCI to LAD on 3/18.  - cont dapt, atorvastatin 40 mg daily, metop succinate 100mg daily  - safe for discharge with outpatient follow up    #HFrEF  EF 25-30%. LHC as above  - continue metop succinate as above    Amrit Duckworth MD  Cardiology fellow  [phone]

## 2019-03-21 PROBLEM — I25.10 ATHEROSCLEROTIC HEART DISEASE OF NATIVE CORONARY ARTERY WITHOUT ANGINA PECTORIS: Chronic | Status: ACTIVE | Noted: 2019-03-07

## 2019-03-21 PROBLEM — I35.0 NONRHEUMATIC AORTIC (VALVE) STENOSIS: Chronic | Status: ACTIVE | Noted: 2019-03-07

## 2019-03-21 PROBLEM — H40.9 UNSPECIFIED GLAUCOMA: Chronic | Status: ACTIVE | Noted: 2019-03-07

## 2019-03-21 PROBLEM — J45.909 UNSPECIFIED ASTHMA, UNCOMPLICATED: Chronic | Status: ACTIVE | Noted: 2019-03-07

## 2019-03-29 ENCOUNTER — APPOINTMENT (OUTPATIENT)
Dept: CARDIOLOGY | Facility: CLINIC | Age: 74
End: 2019-03-29
Payer: MEDICARE

## 2019-03-29 ENCOUNTER — NON-APPOINTMENT (OUTPATIENT)
Age: 74
End: 2019-03-29

## 2019-03-29 VITALS
OXYGEN SATURATION: 98 % | DIASTOLIC BLOOD PRESSURE: 77 MMHG | WEIGHT: 169 LBS | SYSTOLIC BLOOD PRESSURE: 140 MMHG | HEART RATE: 81 BPM

## 2019-03-29 DIAGNOSIS — R94.31 ABNORMAL ELECTROCARDIOGRAM [ECG] [EKG]: ICD-10-CM

## 2019-03-29 DIAGNOSIS — I21.4 NON-ST ELEVATION (NSTEMI) MYOCARDIAL INFARCTION: ICD-10-CM

## 2019-03-29 DIAGNOSIS — J45.909 UNSPECIFIED ASTHMA, UNCOMPLICATED: ICD-10-CM

## 2019-03-29 PROCEDURE — 99214 OFFICE O/P EST MOD 30 MIN: CPT

## 2019-03-29 PROCEDURE — 93000 ELECTROCARDIOGRAM COMPLETE: CPT

## 2019-03-29 RX ORDER — DORZOLAMIDE HYDROCHLORIDE 20 MG/ML
2 SOLUTION OPHTHALMIC
Qty: 20 | Refills: 0 | Status: DISCONTINUED | COMMUNITY
Start: 2018-12-09

## 2019-03-29 RX ORDER — METFORMIN HYDROCHLORIDE 1000 MG/1
1000 TABLET, COATED ORAL
Qty: 180 | Refills: 0 | Status: DISCONTINUED | COMMUNITY
Start: 2019-02-11

## 2019-03-29 RX ORDER — NITROGLYCERIN 0.4 MG/1
0.4 TABLET SUBLINGUAL
Qty: 100 | Refills: 0 | Status: DISCONTINUED | COMMUNITY
Start: 2019-03-04

## 2019-03-29 RX ORDER — PREDNISOLONE ACETATE 10 MG/ML
1 SUSPENSION/ DROPS OPHTHALMIC
Qty: 5 | Refills: 0 | Status: DISCONTINUED | COMMUNITY
Start: 2018-11-14

## 2019-03-29 RX ORDER — LATANOPROST/PF 0.005 %
0.01 DROPS OPHTHALMIC (EYE)
Qty: 8 | Refills: 0 | Status: ACTIVE | COMMUNITY
Start: 2018-12-09

## 2019-03-29 RX ORDER — LOSARTAN POTASSIUM 50 MG/1
50 TABLET, FILM COATED ORAL
Qty: 90 | Refills: 0 | Status: DISCONTINUED | COMMUNITY
Start: 2019-02-11

## 2019-03-29 RX ORDER — MONTELUKAST SODIUM 10 MG/1
10 TABLET, FILM COATED ORAL
Qty: 90 | Refills: 3 | Status: ACTIVE | COMMUNITY
Start: 2019-03-29

## 2019-03-29 RX ORDER — NYSTATIN 100000 [USP'U]/G
100000 POWDER TOPICAL
Qty: 60 | Refills: 0 | Status: DISCONTINUED | COMMUNITY
Start: 2019-02-14

## 2019-03-29 RX ORDER — ATORVASTATIN CALCIUM 20 MG/1
20 TABLET, FILM COATED ORAL
Qty: 90 | Refills: 0 | Status: DISCONTINUED | COMMUNITY
Start: 2019-02-11

## 2019-03-29 RX ORDER — SOLIFENACIN SUCCINATE 10 MG/1
10 TABLET, FILM COATED ORAL DAILY
Refills: 0 | Status: ACTIVE | COMMUNITY
Start: 2019-03-29

## 2019-03-29 RX ORDER — SOLIFENACIN SUCCINATE 10 MG/1
10 TABLET, FILM COATED ORAL
Qty: 90 | Refills: 0 | Status: DISCONTINUED | COMMUNITY
Start: 2019-01-26 | End: 2019-03-29

## 2019-03-29 RX ORDER — INSULIN GLARGINE 100 [IU]/ML
100 INJECTION, SOLUTION SUBCUTANEOUS
Qty: 75 | Refills: 0 | Status: ACTIVE | COMMUNITY
Start: 2019-02-11

## 2019-03-29 RX ORDER — GLIMEPIRIDE 4 MG/1
4 TABLET ORAL
Qty: 90 | Refills: 0 | Status: DISCONTINUED | COMMUNITY
Start: 2019-02-11

## 2019-03-29 RX ORDER — ASCORBIC ACID 500 MG
500 POWDER IN PACKET (EA) ORAL
Refills: 0 | Status: ACTIVE | COMMUNITY
Start: 2019-03-29

## 2019-03-29 RX ORDER — ALBUTEROL SULFATE 108 UG/1
108 (90 BASE) AEROSOL, METERED RESPIRATORY (INHALATION)
Refills: 0 | Status: ACTIVE | COMMUNITY
Start: 2019-03-29

## 2019-03-29 RX ORDER — ASPIRIN ENTERIC COATED TABLETS 81 MG 81 MG/1
81 TABLET, DELAYED RELEASE ORAL
Refills: 6 | Status: ACTIVE | COMMUNITY
Start: 2019-03-29

## 2019-04-15 ENCOUNTER — MEDICATION RENEWAL (OUTPATIENT)
Age: 74
End: 2019-04-15

## 2019-04-17 ENCOUNTER — NON-APPOINTMENT (OUTPATIENT)
Age: 74
End: 2019-04-17

## 2019-04-17 NOTE — PHYSICAL EXAM
[General Appearance - Well Developed] : well developed [Normal Appearance] : normal appearance [Well Groomed] : well groomed [General Appearance - Well Nourished] : well nourished [No Deformities] : no deformities [General Appearance - In No Acute Distress] : no acute distress [Normal Conjunctiva] : the conjunctiva exhibited no abnormalities [Eyelids - No Xanthelasma] : the eyelids demonstrated no xanthelasmas [Normal Oral Mucosa] : normal oral mucosa [No Oral Cyanosis] : no oral cyanosis [No Oral Pallor] : no oral pallor [Normal Jugular Venous V Waves Present] : normal jugular venous V waves present [Normal Jugular Venous A Waves Present] : normal jugular venous A waves present [No Jugular Venous Almaguer A Waves] : no jugular venous almaguer A waves [Respiration, Rhythm And Depth] : normal respiratory rhythm and effort [Auscultation Breath Sounds / Voice Sounds] : lungs were clear to auscultation bilaterally [Exaggerated Use Of Accessory Muscles For Inspiration] : no accessory muscle use [Heart Rate And Rhythm] : heart rate and rhythm were normal [Heart Sounds] : normal S1 and S2 [Murmurs] : no murmurs present [Abdomen Tenderness] : non-tender [Abdomen Soft] : soft [Abdomen Mass (___ Cm)] : no abdominal mass palpated [Abnormal Walk] : normal gait [Gait - Sufficient For Exercise Testing] : the gait was sufficient for exercise testing [Cyanosis, Localized] : no localized cyanosis [Petechial Hemorrhages (___cm)] : no petechial hemorrhages [Nail Clubbing] : no clubbing of the fingernails [] : no rash [Skin Color & Pigmentation] : normal skin color and pigmentation [No Venous Stasis] : no venous stasis [No Skin Ulcers] : no skin ulcer [Skin Lesions] : no skin lesions [No Xanthoma] : no  xanthoma was observed [Oriented To Time, Place, And Person] : oriented to person, place, and time [No Anxiety] : not feeling anxious [Mood] : the mood was normal [Affect] : the affect was normal

## 2019-04-17 NOTE — HISTORY OF PRESENT ILLNESS
[FreeTextEntry1] : Sherine is returning to the office after a recent admission for acute systolic HF - found to have multivessel CAD. She underwent staged PCI and is now returning to the office. She is still feeling SOB and tired - but has no CP or tightness. She has remained on the same diuretics since discharge and is here for further eval and bloodwork.\par No CP\par No syncope\par No LE edema\par No falls or blackouts\par No palpitations\par

## 2019-04-17 NOTE — DISCUSSION/SUMMARY
[FreeTextEntry1] : s/p MV PCI and RX for HF\par Labs reviewed\par To remain on the same meds and return in 1-2 mo (will reassess EF then)\par Referred to cardiac rehab\par

## 2019-04-17 NOTE — REASON FOR VISIT
[Follow-Up - From Hospitalization] : follow-up of a recent hospitalization for [Hypertension] : hypertension [Coronary Artery Disease] : coronary artery disease [Heart Failure] : congestive heart failure [Medication Management] : Medication management

## 2019-06-04 ENCOUNTER — APPOINTMENT (OUTPATIENT)
Dept: CARDIOLOGY | Facility: CLINIC | Age: 74
End: 2019-06-04

## 2019-07-10 ENCOUNTER — APPOINTMENT (OUTPATIENT)
Dept: CARDIOLOGY | Facility: CLINIC | Age: 74
End: 2019-07-10
Payer: MEDICARE

## 2019-07-10 ENCOUNTER — NON-APPOINTMENT (OUTPATIENT)
Age: 74
End: 2019-07-10

## 2019-07-10 VITALS
HEIGHT: 60 IN | BODY MASS INDEX: 33.96 KG/M2 | WEIGHT: 173 LBS | DIASTOLIC BLOOD PRESSURE: 79 MMHG | OXYGEN SATURATION: 97 % | SYSTOLIC BLOOD PRESSURE: 130 MMHG | HEART RATE: 79 BPM

## 2019-07-10 PROCEDURE — 99214 OFFICE O/P EST MOD 30 MIN: CPT

## 2019-07-10 PROCEDURE — 93000 ELECTROCARDIOGRAM COMPLETE: CPT

## 2019-07-10 NOTE — PHYSICAL EXAM
[General Appearance - Well Developed] : well developed [Normal Appearance] : normal appearance [Well Groomed] : well groomed [General Appearance - Well Nourished] : well nourished [No Deformities] : no deformities [General Appearance - In No Acute Distress] : no acute distress [Normal Conjunctiva] : the conjunctiva exhibited no abnormalities [Eyelids - No Xanthelasma] : the eyelids demonstrated no xanthelasmas [Normal Oral Mucosa] : normal oral mucosa [No Oral Pallor] : no oral pallor [Normal Jugular Venous A Waves Present] : normal jugular venous A waves present [No Oral Cyanosis] : no oral cyanosis [No Jugular Venous Almaguer A Waves] : no jugular venous almaguer A waves [Normal Jugular Venous V Waves Present] : normal jugular venous V waves present [Respiration, Rhythm And Depth] : normal respiratory rhythm and effort [Exaggerated Use Of Accessory Muscles For Inspiration] : no accessory muscle use [Auscultation Breath Sounds / Voice Sounds] : lungs were clear to auscultation bilaterally [Murmurs] : no murmurs present [Heart Sounds] : normal S1 and S2 [Heart Rate And Rhythm] : heart rate and rhythm were normal [Abdomen Soft] : soft [Abdomen Tenderness] : non-tender [Abdomen Mass (___ Cm)] : no abdominal mass palpated [Abnormal Walk] : normal gait [Gait - Sufficient For Exercise Testing] : the gait was sufficient for exercise testing [Nail Clubbing] : no clubbing of the fingernails [Petechial Hemorrhages (___cm)] : no petechial hemorrhages [Cyanosis, Localized] : no localized cyanosis [] : no rash [Skin Color & Pigmentation] : normal skin color and pigmentation [Skin Lesions] : no skin lesions [No Venous Stasis] : no venous stasis [No Xanthoma] : no  xanthoma was observed [No Skin Ulcers] : no skin ulcer [Oriented To Time, Place, And Person] : oriented to person, place, and time [Mood] : the mood was normal [Affect] : the affect was normal [No Anxiety] : not feeling anxious

## 2019-07-11 NOTE — REVIEW OF SYSTEMS
[Feeling Fatigued] : feeling fatigued [Negative] : Heme/Lymph [Dyspnea on exertion] : dyspnea during exertion

## 2019-07-11 NOTE — REASON FOR VISIT
[Coronary Artery Disease] : coronary artery disease [Heart Failure] : congestive heart failure [Hypertension] : hypertension [Medication Management] : Medication management [Follow-Up - Clinic] : a clinic follow-up of [Aortic Stenosis] : aortic stenosis [Dyspnea] : dyspnea

## 2019-07-11 NOTE — HISTORY OF PRESENT ILLNESS
[FreeTextEntry1] : Sherine is returning to the office \par Has had med titration done by other cardio MD (Dr. Cooper) and is doing cardiac rehab at [facility].\par She is feeling slightly better - but still experiences MORT[place] and easy fatigue (had to stop twice on the way into the office while walking down the hallway).\par \par No syncope\par No LE edema\par No falls or blackouts\par No palpitations\par No bleeding\par \par

## 2019-07-11 NOTE — DISCUSSION/SUMMARY
[FreeTextEntry1] : s/p MV PCI and RX for HF\par Labs reviewed \par Meds recentyl titrated (ARB - Entresto, diuretic reduced)\par I recc to assess her AS and EF with an Echo. \par No further med titration until then (I am concerned there is severe AS causing some of her symptoms)\par

## 2019-08-06 ENCOUNTER — RX RENEWAL (OUTPATIENT)
Age: 74
End: 2019-08-06

## 2019-08-08 ENCOUNTER — OUTPATIENT (OUTPATIENT)
Dept: OUTPATIENT SERVICES | Facility: HOSPITAL | Age: 74
LOS: 1 days | End: 2019-08-08
Payer: MEDICARE

## 2019-08-08 ENCOUNTER — APPOINTMENT (OUTPATIENT)
Dept: CV DIAGNOSITCS | Facility: HOSPITAL | Age: 74
End: 2019-08-08

## 2019-08-08 DIAGNOSIS — Z90.710 ACQUIRED ABSENCE OF BOTH CERVIX AND UTERUS: Chronic | ICD-10-CM

## 2019-08-08 DIAGNOSIS — I25.10 ATHEROSCLEROTIC HEART DISEASE OF NATIVE CORONARY ARTERY WITHOUT ANGINA PECTORIS: ICD-10-CM

## 2019-08-08 PROCEDURE — 93306 TTE W/DOPPLER COMPLETE: CPT

## 2019-08-08 PROCEDURE — 93306 TTE W/DOPPLER COMPLETE: CPT | Mod: 26

## 2019-08-21 ENCOUNTER — OTHER (OUTPATIENT)
Age: 74
End: 2019-08-21

## 2019-09-19 ENCOUNTER — APPOINTMENT (OUTPATIENT)
Dept: CV DIAGNOSITCS | Facility: HOSPITAL | Age: 74
End: 2019-09-19

## 2019-09-19 ENCOUNTER — OUTPATIENT (OUTPATIENT)
Dept: OUTPATIENT SERVICES | Facility: HOSPITAL | Age: 74
LOS: 1 days | End: 2019-09-19
Payer: MEDICARE

## 2019-09-19 DIAGNOSIS — I25.10 ATHEROSCLEROTIC HEART DISEASE OF NATIVE CORONARY ARTERY WITHOUT ANGINA PECTORIS: ICD-10-CM

## 2019-09-19 DIAGNOSIS — Z90.710 ACQUIRED ABSENCE OF BOTH CERVIX AND UTERUS: Chronic | ICD-10-CM

## 2019-09-19 LAB — GLUCOSE BLDC GLUCOMTR-MCNC: 188 MG/DL — HIGH (ref 70–99)

## 2019-09-19 PROCEDURE — 82962 GLUCOSE BLOOD TEST: CPT

## 2019-09-19 PROCEDURE — 93325 DOPPLER ECHO COLOR FLOW MAPG: CPT

## 2019-09-19 PROCEDURE — 93312 ECHO TRANSESOPHAGEAL: CPT

## 2019-09-19 PROCEDURE — 93325 DOPPLER ECHO COLOR FLOW MAPG: CPT | Mod: 26

## 2019-09-19 PROCEDURE — 93320 DOPPLER ECHO COMPLETE: CPT

## 2019-09-19 PROCEDURE — 93320 DOPPLER ECHO COMPLETE: CPT | Mod: 26

## 2019-09-19 PROCEDURE — 93312 ECHO TRANSESOPHAGEAL: CPT | Mod: 26

## 2019-10-03 PROBLEM — I35.0 MODERATE AORTIC STENOSIS: Status: ACTIVE | Noted: 2019-07-11

## 2019-10-08 ENCOUNTER — APPOINTMENT (OUTPATIENT)
Dept: CARDIOTHORACIC SURGERY | Facility: CLINIC | Age: 74
End: 2019-10-08
Payer: MEDICARE

## 2019-10-08 VITALS
HEIGHT: 60 IN | OXYGEN SATURATION: 96 % | RESPIRATION RATE: 13 BRPM | TEMPERATURE: 98 F | BODY MASS INDEX: 33.38 KG/M2 | WEIGHT: 170 LBS | HEART RATE: 75 BPM | SYSTOLIC BLOOD PRESSURE: 144 MMHG | DIASTOLIC BLOOD PRESSURE: 77 MMHG

## 2019-10-08 VITALS
RESPIRATION RATE: 13 BRPM | DIASTOLIC BLOOD PRESSURE: 77 MMHG | BODY MASS INDEX: 33.38 KG/M2 | HEART RATE: 75 BPM | WEIGHT: 170 LBS | TEMPERATURE: 98.8 F | OXYGEN SATURATION: 96 % | HEIGHT: 60 IN | SYSTOLIC BLOOD PRESSURE: 144 MMHG

## 2019-10-08 DIAGNOSIS — I35.0 NONRHEUMATIC AORTIC (VALVE) STENOSIS: ICD-10-CM

## 2019-10-08 DIAGNOSIS — I50.9 HEART FAILURE, UNSPECIFIED: ICD-10-CM

## 2019-10-08 LAB
ALBUMIN SERPL ELPH-MCNC: 4.4 G/DL
ALP BLD-CCNC: 74 U/L
ALT SERPL-CCNC: 8 U/L
ANION GAP SERPL CALC-SCNC: 8 MMOL/L
AST SERPL-CCNC: 7 U/L
BASOPHILS # BLD AUTO: 0.08 K/UL
BASOPHILS NFR BLD AUTO: 0.8 %
BILIRUB SERPL-MCNC: 0.3 MG/DL
BUN SERPL-MCNC: 20 MG/DL
CALCIUM SERPL-MCNC: 9.7 MG/DL
CHLORIDE SERPL-SCNC: 106 MMOL/L
CO2 SERPL-SCNC: 28 MMOL/L
CREAT SERPL-MCNC: 1.26 MG/DL
EOSINOPHIL # BLD AUTO: 0.31 K/UL
EOSINOPHIL NFR BLD AUTO: 3.1 %
GLUCOSE SERPL-MCNC: 139 MG/DL
HCT VFR BLD CALC: 43 %
HGB BLD-MCNC: 13.4 G/DL
IMM GRANULOCYTES NFR BLD AUTO: 0.5 %
LYMPHOCYTES # BLD AUTO: 1.84 K/UL
LYMPHOCYTES NFR BLD AUTO: 18.2 %
MAN DIFF?: NORMAL
MCHC RBC-ENTMCNC: 29.1 PG
MCHC RBC-ENTMCNC: 31.2 GM/DL
MCV RBC AUTO: 93.5 FL
MONOCYTES # BLD AUTO: 1.03 K/UL
MONOCYTES NFR BLD AUTO: 10.2 %
NEUTROPHILS # BLD AUTO: 6.82 K/UL
NEUTROPHILS NFR BLD AUTO: 67.2 %
NT-PROBNP SERPL-MCNC: 1131 PG/ML
PLATELET # BLD AUTO: 328 K/UL
POTASSIUM SERPL-SCNC: 6 MMOL/L
PROT SERPL-MCNC: 7.4 G/DL
RBC # BLD: 4.6 M/UL
RBC # FLD: 14.6 %
SODIUM SERPL-SCNC: 142 MMOL/L
WBC # FLD AUTO: 10.13 K/UL

## 2019-10-08 PROCEDURE — 93000 ELECTROCARDIOGRAM COMPLETE: CPT

## 2019-10-08 PROCEDURE — 99205 OFFICE O/P NEW HI 60 MIN: CPT

## 2019-10-08 RX ORDER — SACUBITRIL AND VALSARTAN 49; 51 MG/1; MG/1
49-51 TABLET, FILM COATED ORAL TWICE DAILY
Qty: 60 | Refills: 5 | Status: DISCONTINUED | COMMUNITY
Start: 2019-07-02 | End: 2019-10-08

## 2019-10-08 RX ORDER — FUROSEMIDE 20 MG/1
20 TABLET ORAL DAILY
Qty: 90 | Refills: 1 | Status: DISCONTINUED | COMMUNITY
Start: 2019-03-19 | End: 2019-10-08

## 2019-10-08 NOTE — PHYSICAL EXAM
[General Appearance - Well Developed] : well developed [Normal Appearance] : normal appearance [Well Groomed] : well groomed [General Appearance - In No Acute Distress] : no acute distress [General Appearance - Well Nourished] : well nourished [Eyelids - No Xanthelasma] : the eyelids demonstrated no xanthelasmas [No Oral Pallor] : no oral pallor [No Oral Cyanosis] : no oral cyanosis [Normal Jugular Venous V Waves Present] : normal jugular venous V waves present [Normal Rate] : normal [Rhythm Regular] : regular [No Pitting Edema] : no pitting edema present [II] : a grade 2 [Respiration, Rhythm And Depth] : normal respiratory rhythm and effort [Auscultation Breath Sounds / Voice Sounds] : lungs were clear to auscultation bilaterally [Exaggerated Use Of Accessory Muscles For Inspiration] : no accessory muscle use [Bowel Sounds] : normal bowel sounds [Abdomen Soft] : soft [Abdomen Tenderness] : non-tender [Cyanosis, Localized] : no localized cyanosis [Nail Clubbing] : no clubbing of the fingernails [Petechial Hemorrhages (___cm)] : no petechial hemorrhages [Skin Color & Pigmentation] : normal skin color and pigmentation [] : no rash [No Venous Stasis] : no venous stasis [Skin Lesions] : no skin lesions [No Skin Ulcers] : no skin ulcer [Oriented To Time, Place, And Person] : oriented to person, place, and time [No Xanthoma] : no  xanthoma was observed [Affect] : the affect was normal [Mood] : the mood was normal [No Anxiety] : not feeling anxious [FreeTextEntry1] : gait to be assessed

## 2019-10-08 NOTE — CONSULT LETTER
[Dear  ___] : Dear ~JENNIFER, [Courtesy Letter:] : I had the pleasure of seeing your patient, [unfilled], in my office today. [Please see my note below.] : Please see my note below. [Consult Closing:] : Thank you very much for allowing me to participate in the care of this patient.  If you have any questions, please do not hesitate to contact me. [Sincerely,] : Sincerely, [FreeTextEntry2] : Yuan Christie MD\par [address]\[place]  [address] [FreeTextEntry3] : Magen Pak MD\par \par Cardiovascular & Thoracic Surgery\par Professor\par [facility]\par \par

## 2019-10-08 NOTE — HISTORY OF PRESENT ILLNESS
[FreeTextEntry1] : Sherine is a 74 year old female with PMH of asthma, DMT2, HTN, HLD, CAD/NSTEMI (s/p PCI in March 2019), CHF, and aortic valve stenosis. She was referred to valve clinic by her cardiologist Dr. JANIS Christie for consideration for HUDSON.  STS 4.4%. She reports fatigue which has become progressive over the last two months. She denies angina, PND, orthopnea or syncope

## 2019-10-08 NOTE — REVIEW OF SYSTEMS
[Feeling Fatigued] : feeling fatigued [Eyeglasses] : currently wearing eyeglasses [Loss Of Hearing] : hearing loss [Negative] : Heme/Lymph [Shortness Of Breath] : no shortness of breath [Dyspnea on exertion] : not dyspnea during exertion [Chest  Pressure] : no chest pressure [Lower Ext Edema] : no extremity edema

## 2019-10-08 NOTE — HISTORY OF PRESENT ILLNESS
[FreeTextEntry1] : Mrs. Nieves is a 74 year old female, referred by Dr. Christie (also follows with Dr. Cooper) for evaluation of aortic stenosis. Past medical history includes Asthma, T2DM, HTN, HLD, and CAD with NSTEMI s/p PCI to LCx and mLAD in March of this year. Sherine's biggest complaint is fatigue, "I'm tired all the time. Angina has resolved since the PCI. She denies PND, orthopnea, dizziness, syncope, shortness of breath or LE edema. She began going to cardiac rehab in May, 3 days each week. She is able to walk on the treadmill, use the rowing machine, and ride the stationary bike each for ten minutes without difficulty. \par \par She had to stop once while walking from the parking lot to the office.  She did not feel much relief after her PCI.  She notes "I walk a block and I get tired" and she also describes her legs as feeling tired.  She has noticed a decline in functional capacity over the prior 6 months.  She reports her last Cr was 1.7 with Dr Cooper.  She has no angina or syncope.  Her appetite is "good" and bathroom habits normal.

## 2019-10-08 NOTE — PHYSICAL EXAM
[General Appearance - Alert] : alert [General Appearance - In No Acute Distress] : in no acute distress [Sclera] : the sclera and conjunctiva were normal [PERRL With Normal Accommodation] : pupils were equal in size, round, and reactive to light [Outer Ear] : the ears and nose were normal in appearance [Extraocular Movements] : extraocular movements were intact [Oropharynx] : the oropharynx was normal [Jugular Venous Distention Increased] : there was no jugular-venous distention [] : no respiratory distress [Heart Rate And Rhythm] : heart rate was normal and rhythm regular [Respiration, Rhythm And Depth] : normal respiratory rhythm and effort [Edema] : there was no peripheral edema [Examination Of The Chest] : the chest was normal in appearance [Chest Visual Inspection Thoracic Asymmetry] : no chest asymmetry [Abdomen Soft] : soft [Bowel Sounds] : normal bowel sounds [Veins - Varicosity Changes] : there were no varicosital changes [Breast Appearance] : normal in appearance [Cervical Lymph Nodes Enlarged Posterior Bilaterally] : posterior cervical [Cervical Lymph Nodes Enlarged Anterior Bilaterally] : anterior cervical [No CVA Tenderness] : no ~M costovertebral angle tenderness [Abnormal Walk] : normal gait [Involuntary Movements] : no involuntary movements were seen [Skin Color & Pigmentation] : normal skin color and pigmentation [No Focal Deficits] : no focal deficits [Oriented To Time, Place, And Person] : oriented to person, place, and time [FreeTextEntry1] : deferred

## 2019-10-08 NOTE — DATA REVIEWED
[FreeTextEntry1] : ANGELINA on 9/19/2019 \par Conclusions:\par 1. Calcified trileaflet aortic valve with decreased\par opening. Peak transaortic valve gradient equals 29 mm Hg,\par mean transaortic valve gradient equals 17 mm Hg, estimated\par aortic valve area equals 0.9 sqcm (by continuity equation),\par aortic valve velocity time integral equals 62 cm,\par consistent with severe aortic stenosis.\par 2. LVOT diameter: 2 cm.\par Moderate atheroma noted in aortic arch/descending aorta.\par 3. No left atrial or left atrial appendage thrombus.\par Normal left atrial appendage function (velocity= 0.8 /s).\par 4. Severe concentric left ventricular hypertrophy.\par 5. Normal left ventricular systolic function (indexed\par stroke volume 33 ml/meter square).\par 6. Mild diastolic dysfunction (Stage I).\par 7. Estimated pulmonary artery systolic pressure equals 44\par mm Hg, assuming right atrial pressure equals 8 mm Hg,\par consistent with mild pulmonary pressures.\par 8. Agitated saline injection and color flow Doppler\par demonstrates no evidence of a patent foramen ovale.\par \par Cardiac catherization (3/2019) demonstrated CORONARY VESSELS: LAD:   --  Mid LAD: There was a diffuse 70 % stenosis.  INTERVENTIONAL IMPRESSIONS: Successful PCI (staged) to the LAD\par \par \par \par

## 2019-10-08 NOTE — DISCUSSION/SUMMARY
[Severe Aortic Stenosis] : severe aortic stenosis [Multidetector Cardiac CT] : multidetector cardiac CT [Cardiothoracic Surgery Consult] : cardiothoracic surgery consultation [Aortic Valve Replacement] : aortic valve replacement [Coronary Artery Disease] : coronary artery disease [Stable] : stable [None] : none [Patient] : the patient [Family] : the patient's family [FreeTextEntry1] : Sherine has mixed aortic valve disease, with a recent ANGELINA demonstrating moderate AI and borderline severe AS (MARJORIE 1.0 by planimetry).  She has exertional dyspnea, fatigue, and a gradual decline in functional capacity, as observed and corroborated by her .  She has no angina, and angiography would not need to be repeated.  I am in favor of proceeding with a cardiac structural CT to complete her pre-TAVR imaging evaluation--she will have labs today, and if her GFR remains reduced, I would recommend consultation with one of our Nephrologists prior to the CT for renal optimization--which will likely include holding Lasix and Entresto as well as consideration for gentle pre-hydration if she is not volume excess at the time of the CT (she appears euvolemic on exam today).  Regarding her Entresto, I would recommend consideration for stopping Entresto, as her LV function has normalized since her multivessel PCI in March 2019.  I have answered all of her and her 's questions in detail and discussed the case with Dr Christie, who referred her to our Valve Clinic.  We discussed the potential risks and benefits of TAVR, including the possible need for a PPM (she has a baseline LBBB).

## 2019-10-08 NOTE — REVIEW OF SYSTEMS
[Feeling Tired] : feeling tired [SOB on Exertion] : shortness of breath during exertion [Fainting] : fainting [Anxiety] : anxiety [Negative] : Heme/Lymph [Dizziness] : no dizziness [Chest Pain] : no chest pain

## 2019-10-08 NOTE — ASSESSMENT
[FreeTextEntry1] : 74 year old with low gradient nl EF AS felt to be severe by our review.  patient has symptoms of tiredness but denies dyspnea, chest pain or syncope.  Dr. Tadeo in favor of proceeding to TAVR.  Patient acceptable to risk.

## 2019-10-11 LAB
ANION GAP SERPL CALC-SCNC: 16 MMOL/L
BUN SERPL-MCNC: 26 MG/DL
CALCIUM SERPL-MCNC: 9.5 MG/DL
CHLORIDE SERPL-SCNC: 106 MMOL/L
CO2 SERPL-SCNC: 22 MMOL/L
CREAT SERPL-MCNC: 1.3 MG/DL
GLUCOSE SERPL-MCNC: 41 MG/DL
POTASSIUM SERPL-SCNC: 5.6 MMOL/L
SODIUM SERPL-SCNC: 144 MMOL/L

## 2019-10-17 ENCOUNTER — APPOINTMENT (OUTPATIENT)
Dept: NEPHROLOGY | Facility: CLINIC | Age: 74
End: 2019-10-17
Payer: MEDICARE

## 2019-10-17 VITALS
WEIGHT: 168 LBS | HEIGHT: 60 IN | BODY MASS INDEX: 32.98 KG/M2 | HEART RATE: 74 BPM | OXYGEN SATURATION: 98 % | SYSTOLIC BLOOD PRESSURE: 161 MMHG | DIASTOLIC BLOOD PRESSURE: 70 MMHG

## 2019-10-17 DIAGNOSIS — N18.3 CHRONIC KIDNEY DISEASE, STAGE 3 (MODERATE): ICD-10-CM

## 2019-10-17 DIAGNOSIS — E87.5 HYPERKALEMIA: ICD-10-CM

## 2019-10-17 DIAGNOSIS — E11.9 TYPE 2 DIABETES MELLITUS W/OUT COMPLICATIONS: ICD-10-CM

## 2019-10-17 PROCEDURE — 99204 OFFICE O/P NEW MOD 45 MIN: CPT

## 2019-10-17 NOTE — PHYSICAL EXAM
[General Appearance - Alert] : alert [General Appearance - In No Acute Distress] : in no acute distress [Sclera] : the sclera and conjunctiva were normal [General Appearance - Well Nourished] : well nourished [Outer Ear] : the ears and nose were normal in appearance [Neck Appearance] : the appearance of the neck was normal [Neck Cervical Mass (___cm)] : no neck mass was observed [Jugular Venous Distention Increased] : there was no jugular-venous distention [Exaggerated Use Of Accessory Muscles For Inspiration] : no accessory muscle use [Respiration, Rhythm And Depth] : normal respiratory rhythm and effort [Auscultation Breath Sounds / Voice Sounds] : lungs were clear to auscultation bilaterally [Heart Sounds] : normal S1 and S2 [Heart Sounds Gallop] : no gallops [Heart Sounds Pericardial Friction Rub] : no pericardial rub [Systolic grade ___/6] : A grade [unfilled]/6 systolic murmur was heard. [___ +] : bilateral [unfilled]+ pretibial pitting edema [Bowel Sounds] : normal bowel sounds [Abdomen Tenderness] : non-tender [Abdomen Soft] : soft [] : no hepato-splenomegaly [Cervical Lymph Nodes Enlarged Posterior Bilaterally] : posterior cervical [Cervical Lymph Nodes Enlarged Anterior Bilaterally] : anterior cervical [No CVA Tenderness] : no ~M costovertebral angle tenderness [No Spinal Tenderness] : no spinal tenderness [Abnormal Walk] : normal gait [Oriented To Time, Place, And Person] : oriented to person, place, and time [Affect] : the affect was normal [Impaired Insight] : insight and judgment were intact [Mood] : the mood was normal

## 2019-10-17 NOTE — HISTORY OF PRESENT ILLNESS
[FreeTextEntry1] : Today I had the pleasure of meeting Sherine Nieves who is here today for evaluation of an elevated creatinine and hyperkalemia prior to planned HUDSON.  The patient is a 74 years old woman who was born in [place] and raised here on [place].  she worked in the [employer] department at [employer] for many years which is where she met her .  She has one daughter and two grandsons.  Overall throughout her life she has been pretty healthy.  She had no issues during pregnancy as far as she recalls.  In 2000, she was noted to have a breast nodule and she was started on tamoxifen.  in 2006 she had a hysterectomy for uterine cancer.  About 20 years ago she was diagnosed with hypertension and diabetes.  She has been on insulin for more than a decade.  In 2013 her creatinine was 1.07.  In March of this year (after a hospitalization for CHF and CAD s/p stents) she was started on entresto and her creatinine subsequently alessandro to about 1.7 and had a potassium of 5.5.  At that time she was also on lasix 40 daily.  Earlier this month her creatinine and potassium remained elevated and so entresto was held and lasix dose was reduced to 20 QOD.\par \par On my evaluation today she reports feeling well overall.  She has no rene chest pain but she has some tightness in her legs and throughout her body when she exerts herself.  She notes no change in edema recently.  She has not had any fever cough or dyspnea.  Her dietary history is significant for lots of potassium containing foods and lots of salt (she eats at fast food restaurants regularly).

## 2019-10-17 NOTE — ASSESSMENT
[FreeTextEntry1] : This is a 74 years old woman with a history of HTN here for evaluation of elevated creatinine and hyperkalemia\par \par Chronic Kidney Disease Stage III -- The most likely etiology is underlying diabetic kidney disease.  Overall her creatinine is stable at 1.2 to 1.3.  The acute rise to 1.7 was likely hemodynamic in response to entresto.  Agree with holding entresto for now.  She will continue to take lasix every other day and will hold on the day of the CT scheduled for next monday.  She will have blood work checked again after the CT coronaries.  I explained that the risk of contrast - related kidney events is low in her case but still present.  Risk benefit favors proceeding with CT.   Given HTN, LVH, and AS risk / benefit does not favor using fluids before the procedure.  \par \par Hyperkalemia -- Hyperkalemia in the setting of CKD, entresto, and DM.  Will recheck today.  Reviewed low potassium diet.\par \par Hypertension -- Volume mediated hypertension in the setting of high salt diet and CKD.  She will continue to monitor her blood pressure at cardiac rehab 3 times per week.  She reports systolic is usually closer to 140.  The readings obtained today seem to be unusual for her and might be attributable to white coat hypertension.  She will try to have a low salt diet.  I recommended against fast food.  \par \par rto 4 months or after HUDSON.\par

## 2019-10-17 NOTE — REVIEW OF SYSTEMS
[Fever] : no fever [Chills] : no chills [Eyesight Problems] : no eyesight problems [Feeling Tired] : not feeling tired [Feeling Poorly] : not feeling poorly [Nosebleeds] : no nosebleeds [Chest Pain] : no chest pain [Palpitations] : no palpitations [Leg Claudication] : intermittent leg claudication [Lower Ext Edema] : lower extremity edema [Shortness Of Breath] : no shortness of breath [Cough] : no cough [SOB on Exertion] : no shortness of breath during exertion [Abdominal Pain] : no abdominal pain [Dysuria] : no dysuria [Vomiting] : no vomiting [Incontinence] : no incontinence [Arthralgias] : no arthralgias [Joint Pain] : no joint pain [Joint Swelling] : joint swelling [Skin Lesions] : no skin lesions [Joint Stiffness] : no joint stiffness [Dizziness] : no dizziness [Fainting] : no fainting [Anxiety] : no anxiety [Depression] : no depression [Easy Bleeding] : no tendency for easy bleeding [Easy Bruising] : no tendency for easy bruising

## 2019-10-18 LAB
ALBUMIN SERPL ELPH-MCNC: 4.5 G/DL
ANION GAP SERPL CALC-SCNC: 13 MMOL/L
APPEARANCE: CLEAR
BACTERIA: ABNORMAL
BASOPHILS # BLD AUTO: 0.07 K/UL
BASOPHILS NFR BLD AUTO: 0.9 %
BILIRUBIN URINE: NEGATIVE
BLOOD URINE: NEGATIVE
BUN SERPL-MCNC: 26 MG/DL
CALCIUM SERPL-MCNC: 9.3 MG/DL
CHLORIDE SERPL-SCNC: 103 MMOL/L
CO2 SERPL-SCNC: 24 MMOL/L
COLOR: NORMAL
CREAT SERPL-MCNC: 1.27 MG/DL
CREAT SPEC-SCNC: 74 MG/DL
CREAT/PROT UR: 0.3 RATIO
EOSINOPHIL # BLD AUTO: 0.16 K/UL
EOSINOPHIL NFR BLD AUTO: 2.1 %
FERRITIN SERPL-MCNC: 74 NG/ML
GLUCOSE QUALITATIVE U: NEGATIVE
GLUCOSE SERPL-MCNC: 198 MG/DL
HCT VFR BLD CALC: 41.9 %
HGB BLD-MCNC: 13.3 G/DL
HYALINE CASTS: 0 /LPF
IMM GRANULOCYTES NFR BLD AUTO: 0.4 %
IRON SATN MFR SERPL: 20 %
IRON SERPL-MCNC: 58 UG/DL
KETONES URINE: NEGATIVE
LEUKOCYTE ESTERASE URINE: ABNORMAL
LYMPHOCYTES # BLD AUTO: 1.16 K/UL
LYMPHOCYTES NFR BLD AUTO: 14.9 %
MAN DIFF?: NORMAL
MCHC RBC-ENTMCNC: 29.9 PG
MCHC RBC-ENTMCNC: 31.7 GM/DL
MCV RBC AUTO: 94.2 FL
MICROSCOPIC-UA: NORMAL
MONOCYTES # BLD AUTO: 0.74 K/UL
MONOCYTES NFR BLD AUTO: 9.5 %
NEUTROPHILS # BLD AUTO: 5.64 K/UL
NEUTROPHILS NFR BLD AUTO: 72.2 %
NITRITE URINE: POSITIVE
PH URINE: 6
PHOSPHATE SERPL-MCNC: 4.3 MG/DL
PLATELET # BLD AUTO: 287 K/UL
POTASSIUM SERPL-SCNC: 5.2 MMOL/L
PROT UR-MCNC: 21 MG/DL
PROTEIN URINE: ABNORMAL
RBC # BLD: 4.45 M/UL
RBC # FLD: 14.2 %
RED BLOOD CELLS URINE: 2 /HPF
SODIUM SERPL-SCNC: 140 MMOL/L
SPECIFIC GRAVITY URINE: 1.02
SQUAMOUS EPITHELIAL CELLS: 21 /HPF
TIBC SERPL-MCNC: 289 UG/DL
UIBC SERPL-MCNC: 231 UG/DL
URINE COMMENTS: NORMAL
UROBILINOGEN URINE: NORMAL
WBC # FLD AUTO: 7.8 K/UL
WHITE BLOOD CELLS URINE: 78 /HPF

## 2019-10-21 ENCOUNTER — OUTPATIENT (OUTPATIENT)
Dept: OUTPATIENT SERVICES | Facility: HOSPITAL | Age: 74
LOS: 1 days | End: 2019-10-21
Payer: MEDICARE

## 2019-10-21 ENCOUNTER — APPOINTMENT (OUTPATIENT)
Dept: CARDIOLOGY | Facility: CLINIC | Age: 74
End: 2019-10-21

## 2019-10-21 DIAGNOSIS — I35.0 NONRHEUMATIC AORTIC (VALVE) STENOSIS: ICD-10-CM

## 2019-10-21 DIAGNOSIS — Z90.710 ACQUIRED ABSENCE OF BOTH CERVIX AND UTERUS: Chronic | ICD-10-CM

## 2019-10-21 DIAGNOSIS — R07.9 CHEST PAIN, UNSPECIFIED: ICD-10-CM

## 2019-10-21 PROCEDURE — 75572 CT HRT W/3D IMAGE: CPT

## 2019-10-21 PROCEDURE — 75572 CT HRT W/3D IMAGE: CPT | Mod: 26

## 2019-10-30 ENCOUNTER — RESULT REVIEW (OUTPATIENT)
Age: 74
End: 2019-10-30

## 2019-11-01 ENCOUNTER — APPOINTMENT (OUTPATIENT)
Dept: ULTRASOUND IMAGING | Facility: HOSPITAL | Age: 74
End: 2019-11-01

## 2020-01-07 ENCOUNTER — RX RENEWAL (OUTPATIENT)
Age: 75
End: 2020-01-07

## 2020-02-03 ENCOUNTER — APPOINTMENT (OUTPATIENT)
Dept: CV DIAGNOSITCS | Facility: HOSPITAL | Age: 75
End: 2020-02-03

## 2020-02-03 ENCOUNTER — NON-APPOINTMENT (OUTPATIENT)
Age: 75
End: 2020-02-03

## 2020-02-03 ENCOUNTER — APPOINTMENT (OUTPATIENT)
Dept: CARDIOTHORACIC SURGERY | Facility: CLINIC | Age: 75
End: 2020-02-03
Payer: MEDICARE

## 2020-02-03 ENCOUNTER — OUTPATIENT (OUTPATIENT)
Dept: OUTPATIENT SERVICES | Facility: HOSPITAL | Age: 75
LOS: 1 days | End: 2020-02-03
Payer: MEDICARE

## 2020-02-03 VITALS
SYSTOLIC BLOOD PRESSURE: 170 MMHG | WEIGHT: 168 LBS | TEMPERATURE: 98.9 F | HEIGHT: 60 IN | BODY MASS INDEX: 32.98 KG/M2 | DIASTOLIC BLOOD PRESSURE: 79 MMHG | OXYGEN SATURATION: 95 % | RESPIRATION RATE: 14 BRPM | HEART RATE: 68 BPM

## 2020-02-03 DIAGNOSIS — I25.10 ATHEROSCLEROTIC HEART DISEASE OF NATIVE CORONARY ARTERY WITHOUT ANGINA PECTORIS: ICD-10-CM

## 2020-02-03 DIAGNOSIS — Z90.710 ACQUIRED ABSENCE OF BOTH CERVIX AND UTERUS: Chronic | ICD-10-CM

## 2020-02-03 DIAGNOSIS — I35.0 NONRHEUMATIC AORTIC (VALVE) STENOSIS: ICD-10-CM

## 2020-02-03 PROCEDURE — 99215 OFFICE O/P EST HI 40 MIN: CPT

## 2020-02-03 PROCEDURE — 93000 ELECTROCARDIOGRAM COMPLETE: CPT

## 2020-02-03 PROCEDURE — 93306 TTE W/DOPPLER COMPLETE: CPT

## 2020-02-03 PROCEDURE — 93306 TTE W/DOPPLER COMPLETE: CPT | Mod: 26

## 2020-02-03 RX ORDER — SITAGLIPTIN 100 MG/1
100 TABLET, FILM COATED ORAL DAILY
Refills: 0 | Status: ACTIVE | COMMUNITY
Start: 2019-01-26

## 2020-02-03 RX ORDER — GLIMEPIRIDE 4 MG/1
4 TABLET ORAL
Refills: 0 | Status: DISCONTINUED | COMMUNITY
Start: 2019-10-17 | End: 2020-02-03

## 2020-02-03 NOTE — HISTORY OF PRESENT ILLNESS
[FreeTextEntry1] : MINESH ZAFAR is a 74 year old female here on 02/03/2020, 4 months after she was last seen for evaluation of aortic stenosis. She comes to the office today reporting feeling "OK, I just get tired and sleepy all the time" overall.  She feels that she is a little worse than she was 4 months ago. She is more tired.  She gets dyspneic with short distances. She has to stop about every 75 feet to rest mainly because she is tired but she also has sciatica down both legs.  She also gets dizzy if she stands up to fast when getting up in the morning.  She currently denies fever, chills, palpitations, angina, lightheadedness, syncope, or peripheral edema. Her energy level is poor and appetite is good. Denies bowel or bladder problems though has urinary frequency while taking the Furosemide.  She recently had a cold and had to see her PCP.  No new hospitalizations, emergency room/urgent care visits, new medical diagnoses, surgery, or cardiac testing since other than the echo she had done today since her  last office visit.\par \par Minesh's chief complaint is fatigue, which may have progressed since her prior visit.  Her  notes she becomes "spent" after walking 75 feet, requiring rest.  She has no angina or syncopal symptoms.  Of note, her Lisinopril was stopped due to a cough, which has improved.  Entresto was also stopped.   \par 12/12/2019 FLP TC - 176, TG - 190, LDL - 103, HDL - 35; BUN/Creat 29/1.55, eGFR 33\par PCP: TONI Morales with Dr. Jeovany Bonilla\par Card: Dr. Yuan Christie

## 2020-02-03 NOTE — DISCUSSION/SUMMARY
[Severe Aortic Stenosis] : severe aortic stenosis [Aortic Valve Replacement] : aortic valve replacement [Essential Hypertension] : essential hypertension [None] : none [Family] : the patient's family [Patient] : the patient [FreeTextEntry1] : Sherine presents for follow up in the setting of symptomatic aortic stenosis.  I have reviewed her TTE done today in detail with Dr Rivera, which demonstrates slight progression of mean gradients and velocity (low to mid 20's and 3m/s, respectively) as compared to her prior exam in September, with an MARJORIE that calculates less than 1--consistent with low gradient severe AS.  She would like to proceed with TAVR.  Her CT and angiogram were completed and do not require repeating (she has prior stents but no angina).  I have reviewed her CT in detail, which demonstrates AV dimensions suitable for a 23mm David 3 valve--her SOV diameters are too small to accommodate a 26mm CoreValve Evolut; her TF access is borderline small but appears acceptable as an initial option--with an alternative access bail out plan to be determined by Dr Pak.  Her case will be scheduled on a Wednesday (for myself, Dr Christie, and Dr Pak as the team, per her request).  Her BP is above goal, and I discussed restarting an ARB (she was prior on Losartan), however she prefers to address her HTN post-operatively, which is acceptable.

## 2020-02-03 NOTE — PHYSICAL EXAM
[General Appearance - Well Developed] : well developed [Normal Appearance] : normal appearance [Well Groomed] : well groomed [General Appearance - Well Nourished] : well nourished [No Deformities] : no deformities [General Appearance - In No Acute Distress] : no acute distress [Normal Conjunctiva] : the conjunctiva exhibited no abnormalities [Eyelids - No Xanthelasma] : the eyelids demonstrated no xanthelasmas [Normal Oral Mucosa] : normal oral mucosa [No Oral Cyanosis] : no oral cyanosis [No Oral Pallor] : no oral pallor [Normal Jugular Venous A Waves Present] : normal jugular venous A waves present [Normal Jugular Venous V Waves Present] : normal jugular venous V waves present [No Jugular Venous Almaguer A Waves] : no jugular venous almaguer A waves [Respiration, Rhythm And Depth] : normal respiratory rhythm and effort [Exaggerated Use Of Accessory Muscles For Inspiration] : no accessory muscle use [Auscultation Breath Sounds / Voice Sounds] : lungs were clear to auscultation bilaterally [Normal Rate] : normal [Heart Rate ___] : [unfilled] bpm [Normal S1] : normal S1 [Normal S2] : normal S2 [Rhythm Regular] : regular [No Gallop] : no gallop heard [II] : a grade 2 [2+] : left 2+ [No Abnormalities] : the abdominal aorta was not enlarged and no bruit was heard [No Pitting Edema] : no pitting edema present [Bowel Sounds] : normal bowel sounds [Abdomen Soft] : soft [Abdomen Tenderness] : non-tender [Abnormal Walk] : normal gait [Nail Clubbing] : no clubbing of the fingernails [Cyanosis, Localized] : no localized cyanosis [Petechial Hemorrhages (___cm)] : no petechial hemorrhages [Skin Color & Pigmentation] : normal skin color and pigmentation [Skin Turgor] : normal skin turgor [] : no rash [No Venous Stasis] : no venous stasis [Oriented To Time, Place, And Person] : oriented to person, place, and time [Impaired Insight] : insight and judgment were intact [Affect] : the affect was normal [Mood] : the mood was normal [Memory Recent] : recent memory was not impaired [Memory Remote] : remote memory was not impaired [No Anxiety] : not feeling anxious [FreeTextEntry1] : breath sounds decreased throughout [Click] : no click [Distant] : the heart sounds were ~L not distant [Right Carotid Bruit] : no bruit heard over the right carotid [Pericardial Rub] : no pericardial rub [Bruit] : no bruit heard [Left Carotid Bruit] : no bruit heard over the left carotid [Rt] : no varicose veins of the right leg [Lt] : no varicose veins of the left leg

## 2020-02-03 NOTE — REASON FOR VISIT
[Follow-Up - Clinic] : a clinic follow-up of [Aortic Stenosis] : aortic stenosis [Medication Management] : Medication management [Spouse] : spouse

## 2020-03-06 ENCOUNTER — OUTPATIENT (OUTPATIENT)
Dept: OUTPATIENT SERVICES | Facility: HOSPITAL | Age: 75
LOS: 1 days | End: 2020-03-06
Payer: MEDICARE

## 2020-03-06 ENCOUNTER — APPOINTMENT (OUTPATIENT)
Dept: ULTRASOUND IMAGING | Facility: HOSPITAL | Age: 75
End: 2020-03-06

## 2020-03-06 VITALS
RESPIRATION RATE: 16 BRPM | SYSTOLIC BLOOD PRESSURE: 139 MMHG | WEIGHT: 156.97 LBS | DIASTOLIC BLOOD PRESSURE: 62 MMHG | OXYGEN SATURATION: 95 % | HEIGHT: 59 IN | TEMPERATURE: 98 F | HEART RATE: 75 BPM

## 2020-03-06 DIAGNOSIS — Z01.818 ENCOUNTER FOR OTHER PREPROCEDURAL EXAMINATION: ICD-10-CM

## 2020-03-06 DIAGNOSIS — I35.0 NONRHEUMATIC AORTIC (VALVE) STENOSIS: ICD-10-CM

## 2020-03-06 DIAGNOSIS — E11.9 TYPE 2 DIABETES MELLITUS WITHOUT COMPLICATIONS: ICD-10-CM

## 2020-03-06 DIAGNOSIS — Z79.01 LONG TERM (CURRENT) USE OF ANTICOAGULANTS: ICD-10-CM

## 2020-03-06 DIAGNOSIS — Z90.710 ACQUIRED ABSENCE OF BOTH CERVIX AND UTERUS: Chronic | ICD-10-CM

## 2020-03-06 DIAGNOSIS — Z98.890 OTHER SPECIFIED POSTPROCEDURAL STATES: Chronic | ICD-10-CM

## 2020-03-06 DIAGNOSIS — Z29.9 ENCOUNTER FOR PROPHYLACTIC MEASURES, UNSPECIFIED: ICD-10-CM

## 2020-03-06 DIAGNOSIS — Z90.89 ACQUIRED ABSENCE OF OTHER ORGANS: Chronic | ICD-10-CM

## 2020-03-06 LAB
ALBUMIN SERPL ELPH-MCNC: 4.3 G/DL — SIGNIFICANT CHANGE UP (ref 3.3–5)
ALP SERPL-CCNC: 86 U/L — SIGNIFICANT CHANGE UP (ref 40–120)
ALT FLD-CCNC: 13 U/L — SIGNIFICANT CHANGE UP (ref 10–45)
ANION GAP SERPL CALC-SCNC: 12 MMOL/L — SIGNIFICANT CHANGE UP (ref 5–17)
AST SERPL-CCNC: 16 U/L — SIGNIFICANT CHANGE UP (ref 10–40)
BILIRUB SERPL-MCNC: 0.2 MG/DL — SIGNIFICANT CHANGE UP (ref 0.2–1.2)
BLD GP AB SCN SERPL QL: NEGATIVE — SIGNIFICANT CHANGE UP
BUN SERPL-MCNC: 28 MG/DL — HIGH (ref 7–23)
CALCIUM SERPL-MCNC: 9.4 MG/DL — SIGNIFICANT CHANGE UP (ref 8.4–10.5)
CHLORIDE SERPL-SCNC: 102 MMOL/L — SIGNIFICANT CHANGE UP (ref 96–108)
CO2 SERPL-SCNC: 26 MMOL/L — SIGNIFICANT CHANGE UP (ref 22–31)
CREAT SERPL-MCNC: 1.21 MG/DL — SIGNIFICANT CHANGE UP (ref 0.5–1.3)
GLUCOSE SERPL-MCNC: 314 MG/DL — HIGH (ref 70–99)
HBA1C BLD-MCNC: 7.1 % — HIGH (ref 4–5.6)
HCT VFR BLD CALC: 43.7 % — SIGNIFICANT CHANGE UP (ref 34.5–45)
HGB BLD-MCNC: 13.9 G/DL — SIGNIFICANT CHANGE UP (ref 11.5–15.5)
MCHC RBC-ENTMCNC: 29 PG — SIGNIFICANT CHANGE UP (ref 27–34)
MCHC RBC-ENTMCNC: 31.8 GM/DL — LOW (ref 32–36)
MCV RBC AUTO: 91.2 FL — SIGNIFICANT CHANGE UP (ref 80–100)
MRSA PCR RESULT.: SIGNIFICANT CHANGE UP
NRBC # BLD: 0 /100 WBCS — SIGNIFICANT CHANGE UP (ref 0–0)
PLATELET # BLD AUTO: 302 K/UL — SIGNIFICANT CHANGE UP (ref 150–400)
POTASSIUM SERPL-MCNC: 4.7 MMOL/L — SIGNIFICANT CHANGE UP (ref 3.5–5.3)
POTASSIUM SERPL-SCNC: 4.7 MMOL/L — SIGNIFICANT CHANGE UP (ref 3.5–5.3)
PROT SERPL-MCNC: 8 G/DL — SIGNIFICANT CHANGE UP (ref 6–8.3)
RBC # BLD: 4.79 M/UL — SIGNIFICANT CHANGE UP (ref 3.8–5.2)
RBC # FLD: 14 % — SIGNIFICANT CHANGE UP (ref 10.3–14.5)
RH IG SCN BLD-IMP: POSITIVE — SIGNIFICANT CHANGE UP
S AUREUS DNA NOSE QL NAA+PROBE: DETECTED
SODIUM SERPL-SCNC: 140 MMOL/L — SIGNIFICANT CHANGE UP (ref 135–145)
WBC # BLD: 8.88 K/UL — SIGNIFICANT CHANGE UP (ref 3.8–10.5)
WBC # FLD AUTO: 8.88 K/UL — SIGNIFICANT CHANGE UP (ref 3.8–10.5)

## 2020-03-06 PROCEDURE — 87640 STAPH A DNA AMP PROBE: CPT

## 2020-03-06 PROCEDURE — 86900 BLOOD TYPING SEROLOGIC ABO: CPT

## 2020-03-06 PROCEDURE — 93880 EXTRACRANIAL BILAT STUDY: CPT

## 2020-03-06 PROCEDURE — 71046 X-RAY EXAM CHEST 2 VIEWS: CPT | Mod: 26

## 2020-03-06 PROCEDURE — 86901 BLOOD TYPING SEROLOGIC RH(D): CPT

## 2020-03-06 PROCEDURE — 80053 COMPREHEN METABOLIC PANEL: CPT

## 2020-03-06 PROCEDURE — 86850 RBC ANTIBODY SCREEN: CPT

## 2020-03-06 PROCEDURE — 87641 MR-STAPH DNA AMP PROBE: CPT

## 2020-03-06 PROCEDURE — 83036 HEMOGLOBIN GLYCOSYLATED A1C: CPT

## 2020-03-06 PROCEDURE — 71046 X-RAY EXAM CHEST 2 VIEWS: CPT

## 2020-03-06 PROCEDURE — 85027 COMPLETE CBC AUTOMATED: CPT

## 2020-03-06 PROCEDURE — G0463: CPT

## 2020-03-06 PROCEDURE — 93880 EXTRACRANIAL BILAT STUDY: CPT | Mod: 26

## 2020-03-06 RX ORDER — MONTELUKAST 4 MG/1
1 TABLET, CHEWABLE ORAL
Qty: 0 | Refills: 0 | DISCHARGE

## 2020-03-06 RX ORDER — ATORVASTATIN CALCIUM 80 MG/1
1 TABLET, FILM COATED ORAL
Qty: 0 | Refills: 0 | DISCHARGE

## 2020-03-06 RX ORDER — ASCORBIC ACID 60 MG
1 TABLET,CHEWABLE ORAL
Qty: 0 | Refills: 0 | DISCHARGE

## 2020-03-06 RX ORDER — INSULIN GLARGINE 100 [IU]/ML
68 INJECTION, SOLUTION SUBCUTANEOUS
Qty: 0 | Refills: 0 | DISCHARGE

## 2020-03-06 RX ORDER — GLIMEPIRIDE 1 MG
1 TABLET ORAL
Qty: 0 | Refills: 0 | DISCHARGE

## 2020-03-06 RX ORDER — SOLIFENACIN SUCCINATE 10 MG/1
1 TABLET ORAL
Qty: 0 | Refills: 0 | DISCHARGE

## 2020-03-06 NOTE — H&P PST ADULT - NSICDXPASTMEDICALHX_GEN_ALL_CORE_FT
PAST MEDICAL HISTORY:  Aortic stenosis mod-severe    Asthma     Congestive heart failure (CHF)     Coronary artery disease     Diabetes mellitus     Fall in home     Glaucoma     Hyperlipidemia     Hypertension     Nonrheumatic aortic valve stenosis     Uterine cancer s/p RT and MAYNOR-BSO PAST MEDICAL HISTORY:  Aortic stenosis mod-severe    Asthma     Congestive heart failure (CHF)     Coronary artery disease     Diabetes mellitus     Fall in home last month    Glaucoma     Hyperlipidemia     Hypertension     Nonrheumatic aortic valve stenosis     Uterine cancer s/p RT and MAYNOR-BSO    Uterine cancer s/p MAYNOR- BSO

## 2020-03-06 NOTE — H&P PST ADULT - ASSESSMENT
75 yo female with aortic valve stenosis scheduled for a TAVR on 3/26/2020 with Dr. Pak.     CAPRINI SCORE [CLOT]    AGE RELATED RISK FACTORS                                                       MOBILITY RELATED FACTORS  [ ] Age 41-60 years                                            (1 Point)                  [ ] Bed rest                                                        (1 Point)  [x ] Age: 61-74 years                                           (2 Points)                 [ ] Plaster cast                                                   (2 Points)  [ ] Age= 75 years                                              (3 Points)                 [ ] Bed bound for more than 72 hours                 (2 Points)    DISEASE RELATED RISK FACTORS                                               GENDER SPECIFIC FACTORS  [ ] Edema in the lower extremities                       (1 Point)                  [ ] Pregnancy                                                     (1 Point)  [ ] Varicose veins                                               (1 Point)                  [ ] Post-partum < 6 weeks                                   (1 Point)             [ x] BMI > 25 Kg/m2                                            (1 Point)                  [ ] Hormonal therapy  or oral contraception          (1 Point)                 [ ] Sepsis (in the previous month)                        (1 Point)                  [ ] History of pregnancy complications                 (1 point)  [ ] Pneumonia or serious lung disease                                               [ ] Unexplained or recurrent                     (1 Point)           (in the previous month)                               (1 Point)  [ ] Abnormal pulmonary function test                     (1 Point)                 SURGERY RELATED RISK FACTORS  [ ] Acute myocardial infarction                              (1 Point)                 [ ]  Section                                             (1 Point)  [ ] Congestive heart failure (in the previous month)  (1 Point)               [ ] Minor surgery                                                  (1 Point)   [ ] Inflammatory bowel disease                             (1 Point)                 [ ] Arthroscopic surgery                                        (2 Points)  [ ] Central venous access                                      (2 Points)                [x ] General surgery lasting more than 45 minutes   (2 Points)       [ ] Stroke (in the previous month)                          (5 Points)               [ ] Elective arthroplasty                                         (5 Points)                                                                                                                                               HEMATOLOGY RELATED FACTORS                                                 TRAUMA RELATED RISK FACTORS  [ ] Prior episodes of VTE                                     (3 Points)                [ ] Fracture of the hip, pelvis, or leg                       (5 Points)  [ ] Positive family history for VTE                         (3 Points)                 [ ] Acute spinal cord injury (in the previous month)  (5 Points)  [ ] Prothrombin 72243 A                                     (3 Points)                 [ ] Paralysis  (less than 1 month)                             (5 Points)  [ ] Factor V Leiden                                             (3 Points)                  [ ] Multiple Trauma within 1 month                        (5 Points)  [ ] Lupus anticoagulants                                     (3 Points)                                                           [ ] Anticardiolipin antibodies                               (3 Points)                                                       [ ] High homocysteine in the blood                      (3 Points)                                             [ ] Other congenital or acquired thrombophilia      (3 Points)                                                [ ] Heparin induced thrombocytopenia                  (3 Points)                                          Total Score [ 5        ]    Caprini Score 0 - 2:  Low Risk, No VTE Prophylaxis required for most patients, encourage ambulation  Caprini Score 3 - 6:  At Risk, pharmacologic VTE prophylaxis is indicated for most patients (in the absence of a contraindication)  Caprini Score Greater than or = 7:  High Risk, pharmacologic VTE prophylaxis is indicated for most patients (in the absence of a contraindication)

## 2020-03-06 NOTE — H&P PST ADULT - HISTORY OF PRESENT ILLNESS
75 yo female with HTN, HLD, Type II DM (on Lantus, glimepiride and Januvia), CAD with 3 stents from March 2019 and Aortic stenosis presents to PST scheduled for a TAVR on 3/26/2020 with Dr. Pak. 73 yo obese female with HTN, HLD, Type II DM (on Lantus, glimepiride and Januvia), CAD with 3 stents from March 2019, on Plavix and ASA, and Aortic stenosis presents to PST scheduled for a TAVR on 3/26/2020 with Dr. Pak. Reports fatigue. Denies SOB, MORTON, CP and Palps.

## 2020-03-06 NOTE — H&P PST ADULT - NSICDXPASTSURGICALHX_GEN_ALL_CORE_FT
PAST SURGICAL HISTORY:  H/O coronary angiogram Multiple 5-6 over the last 10 years    History of tonsillectomy     S/P MAYNOR-BSO (total abdominal hysterectomy and bilateral salpingo-oophorectomy)

## 2020-03-06 NOTE — H&P PST ADULT - NSICDXPROBLEM_GEN_ALL_CORE_FT
PROBLEM DIAGNOSES  Problem: Nonrheumatic aortic valve stenosis  Assessment and Plan: scheduled for a TAVR on 3/26/2020 with Dr. Pak    Problem: Pre-op evaluation  Assessment and Plan: Labs-CBC, CMP,  T&S, A1c, Nose cx with CXR and Carotids   MC   Pre op and 3 day of Hibiclens instructions reviewed and given. Continue Plavix and ASA. Hold diuretic and diabetic meds DOS. Only take 28 units if lantus night before surgery. Avoid NSAIDs and OTC supplements. Verbalized understanding     Problem: Chronic anticoagulation  Assessment and Plan: Continue Plavix and AA    Problem: Diabetes mellitus  Assessment and Plan: A1c pending. Take only 28 units of Lantus night before surgery Hold all diabetic meds DOS. Verbalized understanding. Fingerstick am of surgery.     Problem: Need for prophylactic measure  Assessment and Plan: The Caprini score indicates this patient is at risk for a VTE event (score 3-5).  Most surgical patients in this group would benefit from pharmacologic prophylaxis.  The surgical team will determine the balance between VTE risk and bleeding risk PROBLEM DIAGNOSES  Problem: Nonrheumatic aortic valve stenosis  Assessment and Plan: scheduled for a TAVR on 3/26/2020 with Dr. Pak    Problem: Pre-op evaluation  Assessment and Plan: Labs-CBC, CMP,  T&S, A1c, Nose cx with CXR and Carotids   Pre op and 3 day of Hibiclens instructions reviewed and given. Continue Plavix and ASA. Hold diuretic and diabetic meds DOS. Only take 28 units of Lantus night before surgery. Avoid NSAIDs and OTC supplements. Verbalized understanding     Problem: Chronic anticoagulation  Assessment and Plan: Continue Plavix and AA    Problem: Diabetes mellitus  Assessment and Plan: A1c pending. Take only 28 units of Lantus night before surgery. Hold all diabetic meds DOS. Verbalized understanding. Fingerstick am of surgery.     Problem: Need for prophylactic measure  Assessment and Plan: The Caprini score indicates this patient is at risk for a VTE event (score 3-5).  Most surgical patients in this group would benefit from pharmacologic prophylaxis.  The surgical team will determine the balance between VTE risk and bleeding risk

## 2020-03-06 NOTE — H&P PST ADULT - LAST STRESS TEST
last noted in 2013 but reports she had a quick stress with Dr. Cooper in Feb 2020 last noted in 2013 but reports she had a "quick stress" with Dr. Cooper in Feb 2020

## 2020-03-06 NOTE — H&P PST ADULT - HEARING DISTURBANCE
07/18/18 0819   Wound   Glucose Patient does not check last A1c 6.6   Left Leg Edema Point of Measurement   Great toe to forefoot 10 cm   Heel to ankle 10 cm   Heel to calf 30   Leg circumference 41.2 cm   Ankle circumference 25.7 cm   Foot circumference 25.3 cm   Peripheral Vascular   Edema Left lower extremity   LLE Edema Non-pitting   Sensation RLE Decreased   Sensation LLE Decreased   RLE Neurovascular Assessment   Capillary Refill Less than/equal to 3 seconds   Color Appropriate for ethnicity   Temperature Warm   R Popliteal Pulse +2   R Post Tibial Pulse +2   R Pedal Pulse +2   Claudication Assessment None   LLE Neurovascular Assessment   Capillary Refill Less than/equal to 3 seconds   Color Appropriate for ethnicity   Temperature Warm   L Popliteal Pulse +2   L Post Tibial Pulse +2   L Pedal Pulse +2   L Calf Tenderness Negative   Claudication Assessment None   Left Foot Assessment   Other Deformity Y   Prior Foot Ulcer Y   Charcot Joint Y   Prior Amputation Y   Right Foot Assessment   Other Deformity N   Prior Foot Ulcer N   Charcot Joint N   Prior Amputation N   Left Nail Assessment   Thick N   Discolored Y   Deformed N   Improper Length and Hygiene N   Right Nail Assessment   Thick N   Discolored N   Deformed N   Improper Length and Hygiene N   Wound 07/12/18  Foot Left;Lateral Wound 5, Diabetic Aguilar 1, L. Lateral Foot    Date First Assessed/Time First Assessed: 07/12/18 1039   Wound Type: (c)   Wound Event: Bruise  Location: Foot  Wound Location Orientation: Left;Lateral  Wound Description (Comments): Wound 5, Diabetic Aguilar 1, L. Lateral Foot   Pre-existing: Yes  Ph...    Wound Image     Wound Type Wound   Wound Diabetic Aguilar 1   Dressing Status Old drainage   Wound Cleansed (soap and water)   Wound Length (cm) 2.4 cm   Wound Width (cm) 5.5 cm   Wound Depth (cm)  0.1   Calculated Wound Size (cm^2) (l*w) 13.2 cm^2   Distance Tunneling (cm) 0 cm   Tunneling Position ___ O'Clock 0   Undermining Starts change

## 2020-03-09 DIAGNOSIS — Z22.321 CARRIER OR SUSPECTED CARRIER OF METHICILLIN SUSCEPTIBLE STAPHYLOCOCCUS AUREUS: ICD-10-CM

## 2020-04-25 NOTE — ED ADULT NURSE NOTE - CADM POA CENTRAL LINE
[address] [place] [address]  Phone: [phone]  Fax: [phone]    Jason Delacruz PA-C        April 25, 2020     Patient: Alondra Adorno   YOB: 1985   Date of Visit: 4/25/2020       To Whom it May Concern:    Hadley Velasquez was seen in my clinic on 4/25/2020. She may return to work on 04/27/2020. If you have any questions or concerns, please don't hesitate to call.     Sincerely,         Jason Delacruz PA-C
No

## 2020-05-29 PROBLEM — I35.0 NONRHEUMATIC AORTIC (VALVE) STENOSIS: Chronic | Status: ACTIVE | Noted: 2020-03-06

## 2020-05-29 PROBLEM — I50.9 HEART FAILURE, UNSPECIFIED: Chronic | Status: ACTIVE | Noted: 2020-03-06

## 2020-06-08 ENCOUNTER — OUTPATIENT (OUTPATIENT)
Dept: OUTPATIENT SERVICES | Facility: HOSPITAL | Age: 75
LOS: 1 days | End: 2020-06-08

## 2020-06-08 ENCOUNTER — APPOINTMENT (OUTPATIENT)
Dept: ULTRASOUND IMAGING | Facility: HOSPITAL | Age: 75
End: 2020-06-08

## 2020-06-08 VITALS
RESPIRATION RATE: 16 BRPM | DIASTOLIC BLOOD PRESSURE: 78 MMHG | HEIGHT: 58.27 IN | SYSTOLIC BLOOD PRESSURE: 168 MMHG | WEIGHT: 164.91 LBS | TEMPERATURE: 98 F | OXYGEN SATURATION: 94 % | HEART RATE: 84 BPM

## 2020-06-08 DIAGNOSIS — Z90.89 ACQUIRED ABSENCE OF OTHER ORGANS: Chronic | ICD-10-CM

## 2020-06-08 DIAGNOSIS — Z90.710 ACQUIRED ABSENCE OF BOTH CERVIX AND UTERUS: Chronic | ICD-10-CM

## 2020-06-08 DIAGNOSIS — E11.9 TYPE 2 DIABETES MELLITUS WITHOUT COMPLICATIONS: ICD-10-CM

## 2020-06-08 DIAGNOSIS — Z01.818 ENCOUNTER FOR OTHER PREPROCEDURAL EXAMINATION: ICD-10-CM

## 2020-06-08 DIAGNOSIS — I35.0 NONRHEUMATIC AORTIC (VALVE) STENOSIS: ICD-10-CM

## 2020-06-08 DIAGNOSIS — Z29.9 ENCOUNTER FOR PROPHYLACTIC MEASURES, UNSPECIFIED: ICD-10-CM

## 2020-06-08 DIAGNOSIS — Z98.890 OTHER SPECIFIED POSTPROCEDURAL STATES: Chronic | ICD-10-CM

## 2020-06-08 LAB
A1C WITH ESTIMATED AVERAGE GLUCOSE RESULT: 7 % — HIGH (ref 4–5.6)
ANION GAP SERPL CALC-SCNC: 12 MMOL/L — SIGNIFICANT CHANGE UP (ref 5–17)
BLD GP AB SCN SERPL QL: NEGATIVE — SIGNIFICANT CHANGE UP
BUN SERPL-MCNC: 29 MG/DL — HIGH (ref 7–23)
CALCIUM SERPL-MCNC: 9.6 MG/DL — SIGNIFICANT CHANGE UP (ref 8.4–10.5)
CHLORIDE SERPL-SCNC: 102 MMOL/L — SIGNIFICANT CHANGE UP (ref 96–108)
CO2 SERPL-SCNC: 25 MMOL/L — SIGNIFICANT CHANGE UP (ref 22–31)
CREAT SERPL-MCNC: 1.27 MG/DL — SIGNIFICANT CHANGE UP (ref 0.5–1.3)
ESTIMATED AVERAGE GLUCOSE: 154 MG/DL — HIGH (ref 68–114)
GLUCOSE SERPL-MCNC: 250 MG/DL — HIGH (ref 70–99)
HCT VFR BLD CALC: 43.7 % — SIGNIFICANT CHANGE UP (ref 34.5–45)
HGB BLD-MCNC: 14 G/DL — SIGNIFICANT CHANGE UP (ref 11.5–15.5)
MCHC RBC-ENTMCNC: 29.9 PG — SIGNIFICANT CHANGE UP (ref 27–34)
MCHC RBC-ENTMCNC: 32 GM/DL — SIGNIFICANT CHANGE UP (ref 32–36)
MCV RBC AUTO: 93.2 FL — SIGNIFICANT CHANGE UP (ref 80–100)
MRSA PCR RESULT.: SIGNIFICANT CHANGE UP
NRBC # BLD: 0 /100 WBCS — SIGNIFICANT CHANGE UP (ref 0–0)
PLATELET # BLD AUTO: 297 K/UL — SIGNIFICANT CHANGE UP (ref 150–400)
POTASSIUM SERPL-MCNC: 5 MMOL/L — SIGNIFICANT CHANGE UP (ref 3.5–5.3)
POTASSIUM SERPL-SCNC: 5 MMOL/L — SIGNIFICANT CHANGE UP (ref 3.5–5.3)
RBC # BLD: 4.69 M/UL — SIGNIFICANT CHANGE UP (ref 3.8–5.2)
RBC # FLD: 13 % — SIGNIFICANT CHANGE UP (ref 10.3–14.5)
RH IG SCN BLD-IMP: POSITIVE — SIGNIFICANT CHANGE UP
S AUREUS DNA NOSE QL NAA+PROBE: DETECTED
SODIUM SERPL-SCNC: 139 MMOL/L — SIGNIFICANT CHANGE UP (ref 135–145)
WBC # BLD: 10.06 K/UL — SIGNIFICANT CHANGE UP (ref 3.8–10.5)
WBC # FLD AUTO: 10.06 K/UL — SIGNIFICANT CHANGE UP (ref 3.8–10.5)

## 2020-06-08 RX ORDER — METOPROLOL TARTRATE 50 MG
1 TABLET ORAL
Qty: 0 | Refills: 0 | DISCHARGE

## 2020-06-08 RX ORDER — GLIMEPIRIDE 1 MG
0.5 TABLET ORAL
Qty: 0 | Refills: 0 | DISCHARGE

## 2020-06-08 RX ORDER — CEFUROXIME AXETIL 250 MG
1500 TABLET ORAL ONCE
Refills: 0 | Status: DISCONTINUED | OUTPATIENT
Start: 2020-06-10 | End: 2020-06-12

## 2020-06-08 NOTE — H&P PST ADULT - NSICDXPASTMEDICALHX_GEN_ALL_CORE_FT
PAST MEDICAL HISTORY:  Aortic stenosis mod-severe    Asthma     Congestive heart failure (CHF)     Coronary artery disease     Diabetes mellitus     Fall in home 2/2020    Glaucoma     Hyperlipidemia     Hypertension     Nonrheumatic aortic valve stenosis     Uterine cancer s/p RT and MAYNOR-BSO    Uterine cancer 2008 s/p MAYNOR- BSO RA and chemo

## 2020-06-08 NOTE — H&P PST ADULT - ASSESSMENT
75 yo female with aortic valve stenosis scheduled for a TAVR on 3/26/2020 with Dr. Pak.     CAPRINI SCORE [CLOT]    AGE RELATED RISK FACTORS                                                       MOBILITY RELATED FACTORS  [ ] Age 41-60 years                                            (1 Point)                  [ ] Bed rest                                                        (1 Point)  [x ] Age: 61-74 years                                           (2 Points)                 [ ] Plaster cast                                                   (2 Points)  [ ] Age= 75 years                                              (3 Points)                 [ ] Bed bound for more than 72 hours                 (2 Points)    DISEASE RELATED RISK FACTORS                                               GENDER SPECIFIC FACTORS  [ ] Edema in the lower extremities                       (1 Point)                  [ ] Pregnancy                                                     (1 Point)  [ ] Varicose veins                                               (1 Point)                  [ ] Post-partum < 6 weeks                                   (1 Point)             [ x] BMI > 25 Kg/m2                                            (1 Point)                  [ ] Hormonal therapy  or oral contraception          (1 Point)                 [ ] Sepsis (in the previous month)                        (1 Point)                  [ ] History of pregnancy complications                 (1 point)  [ ] Pneumonia or serious lung disease                                               [ ] Unexplained or recurrent                     (1 Point)           (in the previous month)                               (1 Point)  [ ] Abnormal pulmonary function test                     (1 Point)                 SURGERY RELATED RISK FACTORS  [ ] Acute myocardial infarction                              (1 Point)                 [ ]  Section                                             (1 Point)  [ ] Congestive heart failure (in the previous month)  (1 Point)               [ ] Minor surgery                                                  (1 Point)   [ ] Inflammatory bowel disease                             (1 Point)                 [ ] Arthroscopic surgery                                        (2 Points)  [ ] Central venous access                                      (2 Points)                [x ] General surgery lasting more than 45 minutes   (2 Points)       [ ] Stroke (in the previous month)                          (5 Points)               [ ] Elective arthroplasty                                         (5 Points)                                                                                                                                               HEMATOLOGY RELATED FACTORS                                                 TRAUMA RELATED RISK FACTORS  [ ] Prior episodes of VTE                                     (3 Points)                [ ] Fracture of the hip, pelvis, or leg                       (5 Points)  [ ] Positive family history for VTE                         (3 Points)                 [ ] Acute spinal cord injury (in the previous month)  (5 Points)  [ ] Prothrombin 81139 A                                     (3 Points)                 [ ] Paralysis  (less than 1 month)                             (5 Points)  [ ] Factor V Leiden                                             (3 Points)                  [ ] Multiple Trauma within 1 month                        (5 Points)  [ ] Lupus anticoagulants                                     (3 Points)                                                           [ ] Anticardiolipin antibodies                               (3 Points)                                                       [ ] High homocysteine in the blood                      (3 Points)                                             [ ] Other congenital or acquired thrombophilia      (3 Points)                                                [ ] Heparin induced thrombocytopenia                  (3 Points)                                          Total Score [ 5        ]    Caprini Score 0 - 2:  Low Risk, No VTE Prophylaxis required for most patients, encourage ambulation  Caprini Score 3 - 6:  At Risk, pharmacologic VTE prophylaxis is indicated for most patients (in the absence of a contraindication)  Caprini Score Greater than or = 7:  High Risk, pharmacologic VTE prophylaxis is indicated for most patients (in the absence of a contraindication)

## 2020-06-08 NOTE — H&P PST ADULT - HISTORY OF PRESENT ILLNESS
75 y/o female with HTN, HLD, Type II DM (on Lantus, glimepiride and Januvia), CAD with 3 stents from March 2019, on Plavix and ASA, and Aortic stenosis presents to PST scheduled for a TAVR on 6/10/2020 with Dr. Pak. C/O fatigue and dyspnea on exertion. Denies any palpitations, N/V, fever or chills.

## 2020-06-08 NOTE — H&P PST ADULT - NSICDXFAMILYHX_GEN_ALL_CORE_FT
FAMILY HISTORY:  Father  Still living? Unknown  Family history of type 2 diabetes mellitus, Age at diagnosis: Age Unknown

## 2020-06-08 NOTE — H&P PST ADULT - NSICDXPROBLEM_GEN_ALL_CORE_FT
PROBLEM DIAGNOSES  Problem: Nonrheumatic aortic valve stenosis  Assessment and Plan: TAVR 6/10/20  Continue Plavix and ASA.    Avoid NSAIDs and OTC supplements. Verbalized understanding     Problem: Diabetes mellitus  Assessment and Plan: Finger stick on day of surgery   Hold diuretic and diabetic meds DOS.   Only take 28 units of Lantus night before surgery.     Problem: Need for prophylactic measure  Assessment and Plan: The Caprini score indicates this patient is at risk for a VTE event (score 3-5).  Most surgical patients in this group would benefit from pharmacologic prophylaxis.  The surgical team will determine the balance between VTE risk and bleeding risk

## 2020-06-08 NOTE — H&P PST ADULT - NSICDXPASTSURGICALHX_GEN_ALL_CORE_FT
PAST SURGICAL HISTORY:  H/O coronary angiogram Multiple 5-6 over the last 10 years Drug eluting stent x3 last 3/2020    History of tonsillectomy     S/P MAYNOR-BSO (total abdominal hysterectomy and bilateral salpingo-oophorectomy) 2008

## 2020-06-08 NOTE — H&P PST ADULT - GENERAL COMMENTS
Department of Anesthesiology  Postprocedure Note    Patient: Theresa Hernandez  MRN: [id number]  YOB: 1984  Date of evaluation: 4/26/2019  Time:  8:54 AM     Procedure Summary     Date:  04/26/19 Room / Location:  [address] [place] [address] OR    Anesthesia Start:  0820 Anesthesia Stop:      Procedure:  DEBRIDEMENT Right Heel placement Integra Flowable & Bilayer Wound Matrix (Right Foot) Diagnosis:  (skin ulver w/ necrosis of bone)    Surgeon:  Nestor Jean DPM Responsible Provider:  KRUPA Baptiste CRNA    Anesthesia Type:  MAC, TIVA ASA Status:  3          Anesthesia Type: MAC, TIVA    Allen Phase I: Allen Score: 10    Allen Phase II:      Last vitals: Reviewed and per EMR flowsheets.        Anesthesia Post Evaluation    Patient location during evaluation: PACU  Patient participation: complete - patient participated  Level of consciousness: awake and alert  Pain score: 0  Airway patency: patent  Nausea & Vomiting: no nausea and no vomiting  Complications: no  Cardiovascular status: blood pressure returned to baseline and hemodynamically stable  Respiratory status: acceptable  Hydration status: euvolemic with ambulation

## 2020-06-09 LAB — SARS-COV-2 RNA SPEC QL NAA+PROBE: SIGNIFICANT CHANGE UP

## 2020-06-10 ENCOUNTER — APPOINTMENT (OUTPATIENT)
Dept: CARDIOTHORACIC SURGERY | Facility: HOSPITAL | Age: 75
End: 2020-06-10

## 2020-06-10 ENCOUNTER — INPATIENT (INPATIENT)
Facility: HOSPITAL | Age: 75
LOS: 1 days | Discharge: HOME | DRG: 267 | End: 2020-06-12
Attending: THORACIC SURGERY (CARDIOTHORACIC VASCULAR SURGERY) | Admitting: THORACIC SURGERY (CARDIOTHORACIC VASCULAR SURGERY)
Payer: MEDICARE

## 2020-06-10 VITALS
DIASTOLIC BLOOD PRESSURE: 89 MMHG | WEIGHT: 168.21 LBS | HEIGHT: 58.27 IN | TEMPERATURE: 98 F | SYSTOLIC BLOOD PRESSURE: 177 MMHG | RESPIRATION RATE: 18 BRPM | OXYGEN SATURATION: 98 % | HEART RATE: 81 BPM

## 2020-06-10 DIAGNOSIS — Z90.710 ACQUIRED ABSENCE OF BOTH CERVIX AND UTERUS: Chronic | ICD-10-CM

## 2020-06-10 DIAGNOSIS — I35.0 NONRHEUMATIC AORTIC (VALVE) STENOSIS: ICD-10-CM

## 2020-06-10 DIAGNOSIS — Z90.89 ACQUIRED ABSENCE OF OTHER ORGANS: Chronic | ICD-10-CM

## 2020-06-10 DIAGNOSIS — Z98.890 OTHER SPECIFIED POSTPROCEDURAL STATES: Chronic | ICD-10-CM

## 2020-06-10 DIAGNOSIS — I25.10 ATHEROSCLEROTIC HEART DISEASE OF NATIVE CORONARY ARTERY WITHOUT ANGINA PECTORIS: ICD-10-CM

## 2020-06-10 PROBLEM — C55 MALIGNANT NEOPLASM OF UTERUS, PART UNSPECIFIED: Chronic | Status: ACTIVE | Noted: 2020-03-06
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
## 2020-06-10 LAB
ALBUMIN SERPL ELPH-MCNC: 3.5 G/DL — SIGNIFICANT CHANGE UP (ref 3.3–5)
ALP SERPL-CCNC: 76 U/L — SIGNIFICANT CHANGE UP (ref 40–120)
ALT FLD-CCNC: 7 U/L — LOW (ref 10–45)
ANION GAP SERPL CALC-SCNC: 9 MMOL/L — SIGNIFICANT CHANGE UP (ref 5–17)
APTT BLD: 37.6 SEC — HIGH (ref 27.5–36.3)
APTT BLD: 37.7 SEC — HIGH (ref 27.5–36.3)
AST SERPL-CCNC: 13 U/L — SIGNIFICANT CHANGE UP (ref 10–40)
BASOPHILS # BLD AUTO: 0.07 K/UL — SIGNIFICANT CHANGE UP (ref 0–0.2)
BASOPHILS NFR BLD AUTO: 0.9 % — SIGNIFICANT CHANGE UP (ref 0–2)
BILIRUB SERPL-MCNC: 0.2 MG/DL — SIGNIFICANT CHANGE UP (ref 0.2–1.2)
BUN SERPL-MCNC: 23 MG/DL — SIGNIFICANT CHANGE UP (ref 7–23)
CALCIUM SERPL-MCNC: 8.4 MG/DL — SIGNIFICANT CHANGE UP (ref 8.4–10.5)
CHLORIDE SERPL-SCNC: 104 MMOL/L — SIGNIFICANT CHANGE UP (ref 96–108)
CO2 SERPL-SCNC: 25 MMOL/L — SIGNIFICANT CHANGE UP (ref 22–31)
CREAT SERPL-MCNC: 1.1 MG/DL — SIGNIFICANT CHANGE UP (ref 0.5–1.3)
EOSINOPHIL # BLD AUTO: 0.45 K/UL — SIGNIFICANT CHANGE UP (ref 0–0.5)
EOSINOPHIL NFR BLD AUTO: 5.5 % — SIGNIFICANT CHANGE UP (ref 0–6)
GLUCOSE BLDC GLUCOMTR-MCNC: 102 MG/DL — HIGH (ref 70–99)
GLUCOSE BLDC GLUCOMTR-MCNC: 118 MG/DL — HIGH (ref 70–99)
GLUCOSE BLDC GLUCOMTR-MCNC: 141 MG/DL — HIGH (ref 70–99)
GLUCOSE BLDC GLUCOMTR-MCNC: 147 MG/DL — HIGH (ref 70–99)
GLUCOSE BLDC GLUCOMTR-MCNC: 80 MG/DL — SIGNIFICANT CHANGE UP (ref 70–99)
GLUCOSE SERPL-MCNC: 145 MG/DL — HIGH (ref 70–99)
HCT VFR BLD CALC: 36.9 % — SIGNIFICANT CHANGE UP (ref 34.5–45)
HCT VFR BLD CALC: 40.8 % — SIGNIFICANT CHANGE UP (ref 34.5–45)
HGB BLD-MCNC: 12.1 G/DL — SIGNIFICANT CHANGE UP (ref 11.5–15.5)
HGB BLD-MCNC: 13.3 G/DL — SIGNIFICANT CHANGE UP (ref 11.5–15.5)
IMM GRANULOCYTES NFR BLD AUTO: 0.5 % — SIGNIFICANT CHANGE UP (ref 0–1.5)
INR BLD: 1.09 RATIO — SIGNIFICANT CHANGE UP (ref 0.88–1.16)
INR BLD: 1.19 RATIO — HIGH (ref 0.88–1.16)
LYMPHOCYTES # BLD AUTO: 1.36 K/UL — SIGNIFICANT CHANGE UP (ref 1–3.3)
LYMPHOCYTES # BLD AUTO: 16.6 % — SIGNIFICANT CHANGE UP (ref 13–44)
MCHC RBC-ENTMCNC: 30.2 PG — SIGNIFICANT CHANGE UP (ref 27–34)
MCHC RBC-ENTMCNC: 30.7 PG — SIGNIFICANT CHANGE UP (ref 27–34)
MCHC RBC-ENTMCNC: 32.6 GM/DL — SIGNIFICANT CHANGE UP (ref 32–36)
MCHC RBC-ENTMCNC: 32.8 GM/DL — SIGNIFICANT CHANGE UP (ref 32–36)
MCV RBC AUTO: 92.5 FL — SIGNIFICANT CHANGE UP (ref 80–100)
MCV RBC AUTO: 93.7 FL — SIGNIFICANT CHANGE UP (ref 80–100)
MONOCYTES # BLD AUTO: 0.87 K/UL — SIGNIFICANT CHANGE UP (ref 0–0.9)
MONOCYTES NFR BLD AUTO: 10.6 % — SIGNIFICANT CHANGE UP (ref 2–14)
NEUTROPHILS # BLD AUTO: 5.38 K/UL — SIGNIFICANT CHANGE UP (ref 1.8–7.4)
NEUTROPHILS NFR BLD AUTO: 65.9 % — SIGNIFICANT CHANGE UP (ref 43–77)
NRBC # BLD: 0 /100 WBCS — SIGNIFICANT CHANGE UP (ref 0–0)
NRBC # BLD: 0 /100 WBCS — SIGNIFICANT CHANGE UP (ref 0–0)
PLATELET # BLD AUTO: 208 K/UL — SIGNIFICANT CHANGE UP (ref 150–400)
PLATELET # BLD AUTO: 266 K/UL — SIGNIFICANT CHANGE UP (ref 150–400)
POTASSIUM SERPL-MCNC: 4.9 MMOL/L — SIGNIFICANT CHANGE UP (ref 3.5–5.3)
POTASSIUM SERPL-SCNC: 4.9 MMOL/L — SIGNIFICANT CHANGE UP (ref 3.5–5.3)
PROT SERPL-MCNC: 6 G/DL — SIGNIFICANT CHANGE UP (ref 6–8.3)
PROTHROM AB SERPL-ACNC: 12.6 SEC — SIGNIFICANT CHANGE UP (ref 10–12.9)
PROTHROM AB SERPL-ACNC: 13.8 SEC — HIGH (ref 10–12.9)
RBC # BLD: 3.94 M/UL — SIGNIFICANT CHANGE UP (ref 3.8–5.2)
RBC # BLD: 4.41 M/UL — SIGNIFICANT CHANGE UP (ref 3.8–5.2)
RBC # FLD: 12.8 % — SIGNIFICANT CHANGE UP (ref 10.3–14.5)
RBC # FLD: 12.9 % — SIGNIFICANT CHANGE UP (ref 10.3–14.5)
SODIUM SERPL-SCNC: 138 MMOL/L — SIGNIFICANT CHANGE UP (ref 135–145)
WBC # BLD: 13.85 K/UL — HIGH (ref 3.8–10.5)
WBC # BLD: 8.17 K/UL — SIGNIFICANT CHANGE UP (ref 3.8–10.5)
WBC # FLD AUTO: 13.85 K/UL — HIGH (ref 3.8–10.5)
WBC # FLD AUTO: 8.17 K/UL — SIGNIFICANT CHANGE UP (ref 3.8–10.5)

## 2020-06-10 PROCEDURE — 33361 REPLACE AORTIC VALVE PERQ: CPT | Mod: 62,Q0

## 2020-06-10 PROCEDURE — 93306 TTE W/DOPPLER COMPLETE: CPT | Mod: 26

## 2020-06-10 PROCEDURE — 93010 ELECTROCARDIOGRAM REPORT: CPT

## 2020-06-10 PROCEDURE — 93010 ELECTROCARDIOGRAM REPORT: CPT | Mod: 77

## 2020-06-10 PROCEDURE — 33361 REPLACE AORTIC VALVE PERQ: CPT | Mod: Q0,62

## 2020-06-10 PROCEDURE — 93926 LOWER EXTREMITY STUDY: CPT | Mod: 26,LT

## 2020-06-10 RX ORDER — ATORVASTATIN CALCIUM 80 MG/1
40 TABLET, FILM COATED ORAL AT BEDTIME
Refills: 0 | Status: DISCONTINUED | OUTPATIENT
Start: 2020-06-10 | End: 2020-06-12

## 2020-06-10 RX ORDER — HYDROMORPHONE HYDROCHLORIDE 2 MG/ML
0.25 INJECTION INTRAMUSCULAR; INTRAVENOUS; SUBCUTANEOUS ONCE
Refills: 0 | Status: DISCONTINUED | OUTPATIENT
Start: 2020-06-10 | End: 2020-06-10

## 2020-06-10 RX ORDER — LIDOCAINE HCL 20 MG/ML
0.2 VIAL (ML) INJECTION ONCE
Refills: 0 | Status: DISCONTINUED | OUTPATIENT
Start: 2020-06-10 | End: 2020-06-10

## 2020-06-10 RX ORDER — ASPIRIN/CALCIUM CARB/MAGNESIUM 324 MG
81 TABLET ORAL DAILY
Refills: 0 | Status: DISCONTINUED | OUTPATIENT
Start: 2020-06-10 | End: 2020-06-12

## 2020-06-10 RX ORDER — MUPIROCIN 20 MG/G
1 OINTMENT TOPICAL EVERY 12 HOURS
Refills: 0 | Status: DISCONTINUED | OUTPATIENT
Start: 2020-06-10 | End: 2020-06-10

## 2020-06-10 RX ORDER — HYDRALAZINE HCL 50 MG
5 TABLET ORAL ONCE
Refills: 0 | Status: COMPLETED | OUTPATIENT
Start: 2020-06-10 | End: 2020-06-10

## 2020-06-10 RX ORDER — ATORVASTATIN CALCIUM 80 MG/1
1 TABLET, FILM COATED ORAL
Qty: 0 | Refills: 0 | DISCHARGE

## 2020-06-10 RX ORDER — MONTELUKAST 4 MG/1
1 TABLET, CHEWABLE ORAL
Qty: 0 | Refills: 0 | DISCHARGE

## 2020-06-10 RX ORDER — MUPIROCIN 20 MG/G
1 OINTMENT TOPICAL EVERY 12 HOURS
Refills: 0 | Status: DISCONTINUED | OUTPATIENT
Start: 2020-06-10 | End: 2020-06-12

## 2020-06-10 RX ORDER — ACETAMINOPHEN 500 MG
650 TABLET ORAL EVERY 6 HOURS
Refills: 0 | Status: DISCONTINUED | OUTPATIENT
Start: 2020-06-10 | End: 2020-06-12

## 2020-06-10 RX ORDER — SITAGLIPTIN 50 MG/1
1 TABLET, FILM COATED ORAL
Qty: 0 | Refills: 0 | DISCHARGE

## 2020-06-10 RX ORDER — MONTELUKAST 4 MG/1
10 TABLET, CHEWABLE ORAL AT BEDTIME
Refills: 0 | Status: DISCONTINUED | OUTPATIENT
Start: 2020-06-10 | End: 2020-06-12

## 2020-06-10 RX ORDER — GLIMEPIRIDE 1 MG
1 TABLET ORAL
Qty: 0 | Refills: 0 | DISCHARGE

## 2020-06-10 RX ORDER — SOLIFENACIN SUCCINATE 10 MG/1
1 TABLET ORAL
Qty: 0 | Refills: 0 | DISCHARGE

## 2020-06-10 RX ORDER — SODIUM CHLORIDE 9 MG/ML
1000 INJECTION INTRAMUSCULAR; INTRAVENOUS; SUBCUTANEOUS
Refills: 0 | Status: DISCONTINUED | OUTPATIENT
Start: 2020-06-10 | End: 2020-06-12

## 2020-06-10 RX ORDER — ALBUTEROL 90 UG/1
2 AEROSOL, METERED ORAL
Qty: 0 | Refills: 0 | DISCHARGE

## 2020-06-10 RX ORDER — HEPARIN SODIUM 5000 [USP'U]/ML
5000 INJECTION INTRAVENOUS; SUBCUTANEOUS EVERY 8 HOURS
Refills: 0 | Status: DISCONTINUED | OUTPATIENT
Start: 2020-06-10 | End: 2020-06-12

## 2020-06-10 RX ORDER — INSULIN LISPRO 100/ML
VIAL (ML) SUBCUTANEOUS
Refills: 0 | Status: DISCONTINUED | OUTPATIENT
Start: 2020-06-10 | End: 2020-06-12

## 2020-06-10 RX ORDER — SODIUM CHLORIDE 9 MG/ML
3 INJECTION INTRAMUSCULAR; INTRAVENOUS; SUBCUTANEOUS EVERY 8 HOURS
Refills: 0 | Status: DISCONTINUED | OUTPATIENT
Start: 2020-06-10 | End: 2020-06-10

## 2020-06-10 RX ORDER — ASCORBIC ACID 60 MG
1 TABLET,CHEWABLE ORAL
Qty: 0 | Refills: 0 | DISCHARGE

## 2020-06-10 RX ORDER — ACETAMINOPHEN 500 MG
975 TABLET ORAL ONCE
Refills: 0 | Status: COMPLETED | OUTPATIENT
Start: 2020-06-10 | End: 2020-06-10

## 2020-06-10 RX ORDER — ALBUTEROL 90 UG/1
2 AEROSOL, METERED ORAL EVERY 6 HOURS
Refills: 0 | Status: DISCONTINUED | OUTPATIENT
Start: 2020-06-10 | End: 2020-06-12

## 2020-06-10 RX ORDER — INSULIN GLARGINE 100 [IU]/ML
10 INJECTION, SOLUTION SUBCUTANEOUS AT BEDTIME
Refills: 0 | Status: DISCONTINUED | OUTPATIENT
Start: 2020-06-10 | End: 2020-06-12

## 2020-06-10 RX ORDER — BRINZOLAMIDE/BRIMONIDINE TARTRATE 10; 2 MG/ML; MG/ML
1 SUSPENSION/ DROPS OPHTHALMIC
Qty: 0 | Refills: 0 | DISCHARGE

## 2020-06-10 RX ORDER — METFORMIN HYDROCHLORIDE 850 MG/1
1 TABLET ORAL
Qty: 0 | Refills: 0 | DISCHARGE

## 2020-06-10 RX ORDER — LOSARTAN POTASSIUM 100 MG/1
25 TABLET, FILM COATED ORAL DAILY
Refills: 0 | Status: DISCONTINUED | OUTPATIENT
Start: 2020-06-10 | End: 2020-06-11

## 2020-06-10 RX ORDER — INSULIN LISPRO 100/ML
VIAL (ML) SUBCUTANEOUS AT BEDTIME
Refills: 0 | Status: DISCONTINUED | OUTPATIENT
Start: 2020-06-10 | End: 2020-06-12

## 2020-06-10 RX ORDER — CLOPIDOGREL BISULFATE 75 MG/1
75 TABLET, FILM COATED ORAL DAILY
Refills: 0 | Status: DISCONTINUED | OUTPATIENT
Start: 2020-06-10 | End: 2020-06-12

## 2020-06-10 RX ORDER — LATANOPROST 0.05 MG/ML
1 SOLUTION/ DROPS OPHTHALMIC; TOPICAL AT BEDTIME
Refills: 0 | Status: DISCONTINUED | OUTPATIENT
Start: 2020-06-10 | End: 2020-06-12

## 2020-06-10 RX ADMIN — Medication 975 MILLIGRAM(S): at 22:49

## 2020-06-10 RX ADMIN — ATORVASTATIN CALCIUM 40 MILLIGRAM(S): 80 TABLET, FILM COATED ORAL at 22:23

## 2020-06-10 RX ADMIN — HYDROMORPHONE HYDROCHLORIDE 0.25 MILLIGRAM(S): 2 INJECTION INTRAMUSCULAR; INTRAVENOUS; SUBCUTANEOUS at 16:34

## 2020-06-10 RX ADMIN — HEPARIN SODIUM 5000 UNIT(S): 5000 INJECTION INTRAVENOUS; SUBCUTANEOUS at 22:23

## 2020-06-10 RX ADMIN — INSULIN GLARGINE 10 UNIT(S): 100 INJECTION, SOLUTION SUBCUTANEOUS at 22:23

## 2020-06-10 RX ADMIN — SODIUM CHLORIDE 3 MILLILITER(S): 9 INJECTION INTRAMUSCULAR; INTRAVENOUS; SUBCUTANEOUS at 06:15

## 2020-06-10 RX ADMIN — Medication 5 MILLIGRAM(S): at 16:34

## 2020-06-10 RX ADMIN — LOSARTAN POTASSIUM 25 MILLIGRAM(S): 100 TABLET, FILM COATED ORAL at 14:55

## 2020-06-10 RX ADMIN — MUPIROCIN 1 APPLICATION(S): 20 OINTMENT TOPICAL at 17:18

## 2020-06-10 RX ADMIN — MONTELUKAST 10 MILLIGRAM(S): 4 TABLET, CHEWABLE ORAL at 22:23

## 2020-06-10 RX ADMIN — LATANOPROST 1 DROP(S): 0.05 SOLUTION/ DROPS OPHTHALMIC; TOPICAL at 22:48

## 2020-06-10 NOTE — PROGRESS NOTE ADULT - ASSESSMENT
73 y/o female    sp TAVR   6/10    with HTN, HLD, Type II DM (on Lantus, glimepiride and Januvia), CAD with 3 stents from March 2019, on Plavix and ASA, and Aortic stenosis  6/10   trf too flr   NSR   BL  groins intact

## 2020-06-10 NOTE — PROGRESS NOTE ADULT - SUBJECTIVE AND OBJECTIVE BOX
This patient has been implanted with a Transcatheter Aortic Valve Implant under the following NCT/MARISELA:    TAVR:    Commercial Implant NCT [id number], MARISELA N/A and will be placed into the [facility] Registry.    TONI Molina  [id number]

## 2020-06-10 NOTE — PROGRESS NOTE ADULT - SUBJECTIVE AND OBJECTIVE BOX
VITAL SIGNS    Telemetry:     NSR   70    Vital Signs Last 24 Hrs  T(C): 36.8 (06-10-20 @ 07:13), Max: 36.8 (06-10-20 @ 06:07)  T(F): 98.2 (06-10-20 @ 06:07), Max: 98.2 (06-10-20 @ 06:07)  HR: 81 (06-10-20 @ 07:13) (81 - 81)  BP: 177/89 (06-10-20 @ 07:13) (177/89 - 177/89)  RR: 18 (06-10-20 @ 07:13) (18 - 18)  SpO2: 98% (06-10-20 @ 07:13) (98% - 98%)                   Daily Height in cm: 148 (10 Freddie 2020 07:13)    Daily       Bilirubin Total, Serum: 0.2 mg/dL (06-10 @ 09:47)    CAPILLARY BLOOD GLUCOSE      POCT Blood Glucose.: 141 mg/dL (10 Freddie 2020 09:38)  POCT Blood Glucose.: 147 mg/dL (10 Freddie 2020 06:03)                            PHYSICAL EXAM    Neurology: alert and oriented x 3, moves all extremities with no defecits  CV :  RRR  Lungs:   CTA B/L  Abdomen: soft, nontender, nondistended, positive bowel sounds  Extremities:     no pedal edema  no calf tenderness

## 2020-06-10 NOTE — BRIEF OPERATIVE NOTE - NSICDXBRIEFPROCEDURE_GEN_ALL_CORE_FT
PROCEDURES:  TAVR, percutaneous 10-Freddie-2020 13:07:20 #23 ravindra TAVR via Left Femoral Artery Adrian Atkinson

## 2020-06-11 LAB
ANION GAP SERPL CALC-SCNC: 10 MMOL/L — SIGNIFICANT CHANGE UP (ref 5–17)
BUN SERPL-MCNC: 21 MG/DL — SIGNIFICANT CHANGE UP (ref 7–23)
CALCIUM SERPL-MCNC: 8.7 MG/DL — SIGNIFICANT CHANGE UP (ref 8.4–10.5)
CHLORIDE SERPL-SCNC: 104 MMOL/L — SIGNIFICANT CHANGE UP (ref 96–108)
CO2 SERPL-SCNC: 23 MMOL/L — SIGNIFICANT CHANGE UP (ref 22–31)
CREAT SERPL-MCNC: 1.11 MG/DL — SIGNIFICANT CHANGE UP (ref 0.5–1.3)
GLUCOSE BLDC GLUCOMTR-MCNC: 107 MG/DL — HIGH (ref 70–99)
GLUCOSE BLDC GLUCOMTR-MCNC: 175 MG/DL — HIGH (ref 70–99)
GLUCOSE BLDC GLUCOMTR-MCNC: 187 MG/DL — HIGH (ref 70–99)
GLUCOSE BLDC GLUCOMTR-MCNC: 221 MG/DL — HIGH (ref 70–99)
GLUCOSE SERPL-MCNC: 92 MG/DL — SIGNIFICANT CHANGE UP (ref 70–99)
HCT VFR BLD CALC: 36.7 % — SIGNIFICANT CHANGE UP (ref 34.5–45)
HGB BLD-MCNC: 11.9 G/DL — SIGNIFICANT CHANGE UP (ref 11.5–15.5)
MCHC RBC-ENTMCNC: 30.4 PG — SIGNIFICANT CHANGE UP (ref 27–34)
MCHC RBC-ENTMCNC: 32.4 GM/DL — SIGNIFICANT CHANGE UP (ref 32–36)
MCV RBC AUTO: 93.9 FL — SIGNIFICANT CHANGE UP (ref 80–100)
NRBC # BLD: 0 /100 WBCS — SIGNIFICANT CHANGE UP (ref 0–0)
PLATELET # BLD AUTO: 214 K/UL — SIGNIFICANT CHANGE UP (ref 150–400)
POTASSIUM SERPL-MCNC: 4 MMOL/L — SIGNIFICANT CHANGE UP (ref 3.5–5.3)
POTASSIUM SERPL-SCNC: 4 MMOL/L — SIGNIFICANT CHANGE UP (ref 3.5–5.3)
RBC # BLD: 3.91 M/UL — SIGNIFICANT CHANGE UP (ref 3.8–5.2)
RBC # FLD: 13 % — SIGNIFICANT CHANGE UP (ref 10.3–14.5)
SODIUM SERPL-SCNC: 137 MMOL/L — SIGNIFICANT CHANGE UP (ref 135–145)
WBC # BLD: 12.54 K/UL — HIGH (ref 3.8–10.5)
WBC # FLD AUTO: 12.54 K/UL — HIGH (ref 3.8–10.5)

## 2020-06-11 PROCEDURE — 99232 SBSQ HOSP IP/OBS MODERATE 35: CPT

## 2020-06-11 PROCEDURE — 93306 TTE W/DOPPLER COMPLETE: CPT | Mod: 26

## 2020-06-11 PROCEDURE — 93010 ELECTROCARDIOGRAM REPORT: CPT

## 2020-06-11 RX ORDER — FUROSEMIDE 40 MG
20 TABLET ORAL DAILY
Refills: 0 | Status: DISCONTINUED | OUTPATIENT
Start: 2020-06-11 | End: 2020-06-12

## 2020-06-11 RX ORDER — LOSARTAN POTASSIUM 100 MG/1
50 TABLET, FILM COATED ORAL DAILY
Refills: 0 | Status: DISCONTINUED | OUTPATIENT
Start: 2020-06-11 | End: 2020-06-12

## 2020-06-11 RX ADMIN — MUPIROCIN 1 APPLICATION(S): 20 OINTMENT TOPICAL at 17:28

## 2020-06-11 RX ADMIN — LOSARTAN POTASSIUM 25 MILLIGRAM(S): 100 TABLET, FILM COATED ORAL at 05:43

## 2020-06-11 RX ADMIN — Medication 20 MILLIGRAM(S): at 15:10

## 2020-06-11 RX ADMIN — MONTELUKAST 10 MILLIGRAM(S): 4 TABLET, CHEWABLE ORAL at 21:57

## 2020-06-11 RX ADMIN — INSULIN GLARGINE 10 UNIT(S): 100 INJECTION, SOLUTION SUBCUTANEOUS at 21:58

## 2020-06-11 RX ADMIN — HEPARIN SODIUM 5000 UNIT(S): 5000 INJECTION INTRAVENOUS; SUBCUTANEOUS at 15:07

## 2020-06-11 RX ADMIN — Medication 650 MILLIGRAM(S): at 21:57

## 2020-06-11 RX ADMIN — HEPARIN SODIUM 5000 UNIT(S): 5000 INJECTION INTRAVENOUS; SUBCUTANEOUS at 05:43

## 2020-06-11 RX ADMIN — MUPIROCIN 1 APPLICATION(S): 20 OINTMENT TOPICAL at 05:43

## 2020-06-11 RX ADMIN — Medication 81 MILLIGRAM(S): at 12:13

## 2020-06-11 RX ADMIN — CLOPIDOGREL BISULFATE 75 MILLIGRAM(S): 75 TABLET, FILM COATED ORAL at 12:13

## 2020-06-11 RX ADMIN — HEPARIN SODIUM 5000 UNIT(S): 5000 INJECTION INTRAVENOUS; SUBCUTANEOUS at 21:57

## 2020-06-11 RX ADMIN — Medication 2: at 12:12

## 2020-06-11 RX ADMIN — LATANOPROST 1 DROP(S): 0.05 SOLUTION/ DROPS OPHTHALMIC; TOPICAL at 22:04

## 2020-06-11 RX ADMIN — ATORVASTATIN CALCIUM 40 MILLIGRAM(S): 80 TABLET, FILM COATED ORAL at 21:57

## 2020-06-11 RX ADMIN — Medication 1: at 17:28

## 2020-06-11 NOTE — PROGRESS NOTE ADULT - ASSESSMENT
73 y/o female    sp TAVR   6/10    with HTN, HLD, Type II DM (on Lantus, glimepiride and Januvia), CAD with 3 stents from March 2019, on Plavix and ASA, and Aortic stenosis  6/10   trf too flr   NSR   BL  groins intact   6/11 VSS - Left groin CDI neg for pseudoaneurysm  d./c home friday

## 2020-06-11 NOTE — PROGRESS NOTE ADULT - SUBJECTIVE AND OBJECTIVE BOX
*****Structural Heart Team*****    Subjective:    The patient was found sitting comfortably in bed, offering no complaints at this time. Yesterday she was noted to have an expanding hematoma after her BP went up and she began coughing. Pressure was held to compress the hematoma, ultrasound revealed no pseudoaneurysm. She is also noted to have a new LBBB on her post operative ECG.    PAST MEDICAL & SURGICAL HISTORY:  Uterine cancer: 2008 s/p MAYNOR- BSO RA and chemo  Congestive heart failure (CHF)  Fall in home: 2/2020  Nonrheumatic aortic valve stenosis  Aortic stenosis: mod-severe  Coronary artery disease  Asthma  Glaucoma  Uterine cancer: s/p RT and MAYNOR-BSO  Diabetes mellitus  Hypertension  Hyperlipidemia  H/O coronary angiogram: Multiple 5-6 over the last 10 years Drug eluting stent x3 last 3/2020  History of tonsillectomy  S/P MAYNOR-BSO (total abdominal hysterectomy and bilateral salpingo-oophorectomy): 2008        T(C): 37.2 (06-11-20 @ 04:48), Max: 37.2 (06-11-20 @ 04:48)  HR: 81 (06-11-20 @ 04:48) (67 - 81)  BP: 150/79 (06-11-20 @ 04:48) (145/62 - 163/69)  RR: 17 (06-11-20 @ 04:48) (16 - 17)  SpO2: 96% (06-11-20 @ 04:48) (96% - 99%)  Wt(kg): --  06-10 @ 07:01  -  06-11 @ 07:00  --------------------------------------------------------  IN: 0 mL / OUT: 600 mL / NET: -600 mL    06-11 @ 07:01  -  06-11 @ 12:28  --------------------------------------------------------  IN: 220 mL / OUT: 0 mL / NET: 220 mL      MEDICATIONS  (STANDING):  aspirin enteric coated 81 milliGRAM(s) Oral daily  atorvastatin 40 milliGRAM(s) Oral at bedtime  cefuroxime  IVPB 1500 milliGRAM(s) IV Intermittent once  clopidogrel Tablet 75 milliGRAM(s) Oral daily  furosemide    Tablet 20 milliGRAM(s) Oral daily  heparin   Injectable 5000 Unit(s) SubCutaneous every 8 hours  insulin glargine Injectable (LANTUS) 10 Unit(s) SubCutaneous at bedtime  insulin lispro (HumaLOG) corrective regimen sliding scale   SubCutaneous three times a day before meals  insulin lispro (HumaLOG) corrective regimen sliding scale   SubCutaneous at bedtime  latanoprost 0.005% Ophthalmic Solution 1 Drop(s) Both EYES at bedtime  losartan 50 milliGRAM(s) Oral daily  montelukast 10 milliGRAM(s) Oral at bedtime  mupirocin 2% Ointment 1 Application(s) Topical every 12 hours  sodium chloride 0.9%. 1000 milliLiter(s) (75 mL/Hr) IV Continuous <Continuous>    MEDICATIONS  (PRN):  acetaminophen   Tablet .. 650 milliGRAM(s) Oral every 6 hours PRN Mild Pain (1 - 3)  ALBUTerol    90 MICROgram(s) HFA Inhaler 2 Puff(s) Inhalation every 6 hours PRN Shortness of Breath      Review of Symptoms:  Constitutional: Awake, Alert, Follows commands  Respiratory: Denies SOB, No MORTON  Cardiac: Denies CP, Denies Palpitations  Gastrointestinal: Denies Pain, Denies N/V, tolerating po intake  Vascular: Negative  Extremities: Trace Edema, No joint pain or swelling  Neurological: Negative  Endocrine: No heat or cold intolerance, No excessive thirst  Heme/Onc: Negative    Exam:  General: A/Ox3, LOLI, Following commands  HEENT: Supple, No JVD, Trachea midline, no masses  Pulmonary: CTAB, = Chest Excursion, no accessory muscle use  Cor: S1S2, RRR, No murmur or rub noted.  ECG: SR with LBBB ( msec)  Groin/Wound: Soft, mildly tender and ecchymotic. Hematoma resolved  Gastrointestinal: Soft, NT/ND, + Bowel Sounds  Neuro: = motor and sensory B/L, No focal deficits  Vascular: 1+ pulses B/L, Trace edema noted  Extremities: No joint pain or swelling  Skin: Warm/Dry/Normal color, Normal turgor, no rashes                          11.9   12.54 )-----------( 214      ( 11 Jun 2020 06:23 )             36.7   06-11    137  |  104  |  21  ----------------------------<  92  4.0   |  23  |  1.11    Ca    8.7      11 Jun 2020 06:23    TPro  6.0  /  Alb  3.5  /  TBili  0.2  /  DBili  x   /  AST  13  /  ALT  7<L>  /  AlkPhos  76  06-10  PT/INR - ( 10 Freddie 2020 16:10 )   PT: 12.6 sec;   INR: 1.09 ratio         PTT - ( 10 Freddie 2020 16:10 )  PTT:37.6 sec    Imaging Reviewed:    Post Op TTE: Pending        Assesment/Plan:    74 Female with Severe Aortic Stenosis, S/P TAVR POD#1, Chronic Diastolic Heart Failure, New LBBB, HTN    1.) S/P TAVR: ASA 81 mg po daily, Plavix 75 mg po daily, Continue to Monitor Groins. Ambulate as tolerated.  2.) HTN: Patient was only on Metoprolol preop. We started her on losartan 25 mg po daily yesterday, though her BP still elevated into the 150's systolic. Increased her losartan dose to 50 mg po daily.  3.) Chronic diastolic Heart Failure: Restarted her furosemide 20 mg po daily. Monitor I and O's and daily weight.   4.) New LBBB: Since her TAVR, her QRS has been getting longer. Will continue to hold her metoprolol. Repeat ECG in the morning. If continuing to increase, will consult EPS. She will be discharged home with an MCOT device.  5.) Discharge Plan: Patient is to follow up with Dr. Pak in 1 week post discharge. She should then follow up with [facility]  in 30 days, with a repeat Transthoracic Echo to be done at that visit. I also told her she should re-establish care with Dr. Christie as her outside cardiologist

## 2020-06-11 NOTE — PROGRESS NOTE ADULT - SUBJECTIVE AND OBJECTIVE BOX
VITAL SIGNS-Telemetry:  SR 80  Vital Signs Last 24 Hrs  T(C): 37.2 (20 @ 04:48), Max: 37.2 (20 @ 04:48)  T(F): 99 (20 @ 04:48), Max: 99 (20 @ 04:48)  HR: 81 (20 @ 04:48) (67 - 81)  BP: 150/79 (20 @ 04:48) (145/62 - 163/69)  RR: 17 (20 @ 04:48) (16 - 17)  SpO2: 96% (20 @ 04:48) (96% - 99%)         06-10 @ 07:01  -   @ 07:00  --------------------------------------------------------  IN: 0 mL / OUT: 600 mL / NET: -600 mL    Daily     Daily Weight in k.9 (2020 08:00)    CAPILLARY BLOOD GLUCOSE  POCT Blood Glucose.: 107 mg/dL (2020 07:56)  POCT Blood Glucose.: 118 mg/dL (10 Freddie 2020 22:06)  POCT Blood Glucose.: 102 mg/dL (10 Freddie 2020 15:55)  POCT Blood Glucose.: 80 mg/dL (10 Freddie 2020 15:08)          PHYSICAL EXAM:  Neurology: alert and oriented x 3, nonfocal, no gross deficits  CV : S1S2  Lungs: CTA  Abdomen: soft, nontender, nondistended, positive bowel sounds, last bowel movement         Extremities:    no edema, no calf tenderness- Left groin cdi soft non tender     acetaminophen   Tablet .. 650 milliGRAM(s) Oral every 6 hours PRN  ALBUTerol    90 MICROgram(s) HFA Inhaler 2 Puff(s) Inhalation every 6 hours PRN  aspirin enteric coated 81 milliGRAM(s) Oral daily  atorvastatin 40 milliGRAM(s) Oral at bedtime  cefuroxime  IVPB 1500 milliGRAM(s) IV Intermittent once  clopidogrel Tablet 75 milliGRAM(s) Oral daily  heparin   Injectable 5000 Unit(s) SubCutaneous every 8 hours  insulin glargine Injectable (LANTUS) 10 Unit(s) SubCutaneous at bedtime  insulin lispro (HumaLOG) corrective regimen sliding scale   SubCutaneous three times a day before meals  insulin lispro (HumaLOG) corrective regimen sliding scale   SubCutaneous at bedtime  latanoprost 0.005% Ophthalmic Solution 1 Drop(s) Both EYES at bedtime  losartan 25 milliGRAM(s) Oral daily  montelukast 10 milliGRAM(s) Oral at bedtime  mupirocin 2% Ointment 1 Application(s) Topical every 12 hours  sodium chloride 0.9%. 1000 milliLiter(s) IV Continuous <Continuous>    Physical Therapy Rec:   Home  [x  ]   Home w/ PT  [  ]  Rehab  [  ]  Discussed with Cardiothoracic Team at AM rounds.

## 2020-06-12 ENCOUNTER — APPOINTMENT (OUTPATIENT)
Dept: CARDIOTHORACIC SURGERY | Facility: CLINIC | Age: 75
End: 2020-06-12

## 2020-06-12 ENCOUNTER — TRANSCRIPTION ENCOUNTER (OUTPATIENT)
Age: 75
End: 2020-06-12

## 2020-06-12 VITALS
HEART RATE: 89 BPM | RESPIRATION RATE: 18 BRPM | OXYGEN SATURATION: 97 % | TEMPERATURE: 98 F | SYSTOLIC BLOOD PRESSURE: 131 MMHG | DIASTOLIC BLOOD PRESSURE: 61 MMHG

## 2020-06-12 LAB
ANION GAP SERPL CALC-SCNC: 12 MMOL/L — SIGNIFICANT CHANGE UP (ref 5–17)
BUN SERPL-MCNC: 25 MG/DL — HIGH (ref 7–23)
CALCIUM SERPL-MCNC: 9.4 MG/DL — SIGNIFICANT CHANGE UP (ref 8.4–10.5)
CHLORIDE SERPL-SCNC: 101 MMOL/L — SIGNIFICANT CHANGE UP (ref 96–108)
CO2 SERPL-SCNC: 26 MMOL/L — SIGNIFICANT CHANGE UP (ref 22–31)
CREAT SERPL-MCNC: 1.24 MG/DL — SIGNIFICANT CHANGE UP (ref 0.5–1.3)
GLUCOSE BLDC GLUCOMTR-MCNC: 209 MG/DL — HIGH (ref 70–99)
GLUCOSE BLDC GLUCOMTR-MCNC: 338 MG/DL — HIGH (ref 70–99)
GLUCOSE SERPL-MCNC: 168 MG/DL — HIGH (ref 70–99)
HCT VFR BLD CALC: 35.7 % — SIGNIFICANT CHANGE UP (ref 34.5–45)
HGB BLD-MCNC: 11.8 G/DL — SIGNIFICANT CHANGE UP (ref 11.5–15.5)
MCHC RBC-ENTMCNC: 30.5 PG — SIGNIFICANT CHANGE UP (ref 27–34)
MCHC RBC-ENTMCNC: 33.1 GM/DL — SIGNIFICANT CHANGE UP (ref 32–36)
MCV RBC AUTO: 92.2 FL — SIGNIFICANT CHANGE UP (ref 80–100)
NRBC # BLD: 0 /100 WBCS — SIGNIFICANT CHANGE UP (ref 0–0)
PLATELET # BLD AUTO: 183 K/UL — SIGNIFICANT CHANGE UP (ref 150–400)
POTASSIUM SERPL-MCNC: 3.9 MMOL/L — SIGNIFICANT CHANGE UP (ref 3.5–5.3)
POTASSIUM SERPL-SCNC: 3.9 MMOL/L — SIGNIFICANT CHANGE UP (ref 3.5–5.3)
RBC # BLD: 3.87 M/UL — SIGNIFICANT CHANGE UP (ref 3.8–5.2)
RBC # FLD: 13 % — SIGNIFICANT CHANGE UP (ref 10.3–14.5)
SODIUM SERPL-SCNC: 139 MMOL/L — SIGNIFICANT CHANGE UP (ref 135–145)
WBC # BLD: 10.26 K/UL — SIGNIFICANT CHANGE UP (ref 3.8–10.5)
WBC # FLD AUTO: 10.26 K/UL — SIGNIFICANT CHANGE UP (ref 3.8–10.5)

## 2020-06-12 PROCEDURE — 85610 PROTHROMBIN TIME: CPT

## 2020-06-12 PROCEDURE — C1887: CPT

## 2020-06-12 PROCEDURE — 76000 FLUOROSCOPY <1 HR PHYS/QHP: CPT

## 2020-06-12 PROCEDURE — C1889: CPT

## 2020-06-12 PROCEDURE — C1894: CPT

## 2020-06-12 PROCEDURE — 86900 BLOOD TYPING SEROLOGIC ABO: CPT

## 2020-06-12 PROCEDURE — 85730 THROMBOPLASTIN TIME PARTIAL: CPT

## 2020-06-12 PROCEDURE — 93926 LOWER EXTREMITY STUDY: CPT

## 2020-06-12 PROCEDURE — 93005 ELECTROCARDIOGRAM TRACING: CPT

## 2020-06-12 PROCEDURE — C1769: CPT

## 2020-06-12 PROCEDURE — 87640 STAPH A DNA AMP PROBE: CPT

## 2020-06-12 PROCEDURE — 93010 ELECTROCARDIOGRAM REPORT: CPT

## 2020-06-12 PROCEDURE — 99238 HOSP IP/OBS DSCHRG MGMT 30/<: CPT

## 2020-06-12 PROCEDURE — 86901 BLOOD TYPING SEROLOGIC RH(D): CPT

## 2020-06-12 PROCEDURE — 93306 TTE W/DOPPLER COMPLETE: CPT

## 2020-06-12 PROCEDURE — 86923 COMPATIBILITY TEST ELECTRIC: CPT

## 2020-06-12 PROCEDURE — 80053 COMPREHEN METABOLIC PANEL: CPT

## 2020-06-12 PROCEDURE — C1760: CPT

## 2020-06-12 PROCEDURE — 80048 BASIC METABOLIC PNL TOTAL CA: CPT

## 2020-06-12 PROCEDURE — U0003: CPT

## 2020-06-12 PROCEDURE — G0463: CPT

## 2020-06-12 PROCEDURE — 83036 HEMOGLOBIN GLYCOSYLATED A1C: CPT

## 2020-06-12 PROCEDURE — 82962 GLUCOSE BLOOD TEST: CPT

## 2020-06-12 PROCEDURE — 86850 RBC ANTIBODY SCREEN: CPT

## 2020-06-12 PROCEDURE — 85027 COMPLETE CBC AUTOMATED: CPT

## 2020-06-12 PROCEDURE — 87641 MR-STAPH DNA AMP PROBE: CPT

## 2020-06-12 RX ORDER — ACETAMINOPHEN 500 MG
2 TABLET ORAL
Qty: 0 | Refills: 0 | DISCHARGE
Start: 2020-06-12

## 2020-06-12 RX ORDER — NICOTINE POLACRILEX 2 MG
1 GUM BUCCAL
Refills: 0 | Status: COMPLETED | COMMUNITY
Start: 2020-02-03 | End: 2020-06-12

## 2020-06-12 RX ORDER — LOSARTAN POTASSIUM 100 MG/1
1 TABLET, FILM COATED ORAL
Qty: 30 | Refills: 0
Start: 2020-06-12 | End: 2020-07-11

## 2020-06-12 RX ORDER — BRINZOLAMIDE/BRIMONIDINE TARTRATE 10; 2 MG/ML; MG/ML
1-0.2 SUSPENSION/ DROPS OPHTHALMIC
Qty: 8 | Refills: 0 | Status: COMPLETED | COMMUNITY
Start: 2019-02-27 | End: 2020-06-12

## 2020-06-12 RX ORDER — INSULIN GLARGINE 100 [IU]/ML
36 INJECTION, SOLUTION SUBCUTANEOUS
Qty: 0 | Refills: 0 | DISCHARGE

## 2020-06-12 RX ORDER — MONTELUKAST 4 MG/1
1 TABLET, CHEWABLE ORAL
Qty: 0 | Refills: 0 | DISCHARGE

## 2020-06-12 RX ORDER — ASPIRIN/CALCIUM CARB/MAGNESIUM 324 MG
1 TABLET ORAL
Qty: 30 | Refills: 0
Start: 2020-06-12 | End: 2020-07-11

## 2020-06-12 RX ORDER — METOPROLOL TARTRATE 50 MG
1 TABLET ORAL
Qty: 0 | Refills: 0 | DISCHARGE

## 2020-06-12 RX ORDER — MUPIROCIN 20 MG/G
2 OINTMENT TOPICAL
Qty: 1 | Refills: 0 | Status: COMPLETED | COMMUNITY
Start: 2020-03-09 | End: 2020-06-12

## 2020-06-12 RX ORDER — METOPROLOL SUCCINATE 100 MG/1
100 TABLET, EXTENDED RELEASE ORAL DAILY
Qty: 90 | Refills: 2 | Status: COMPLETED | COMMUNITY
Start: 2019-03-19 | End: 2020-06-12

## 2020-06-12 RX ORDER — CLOPIDOGREL BISULFATE 75 MG/1
1 TABLET, FILM COATED ORAL
Qty: 30 | Refills: 0
Start: 2020-06-12 | End: 2020-07-11

## 2020-06-12 RX ADMIN — LOSARTAN POTASSIUM 50 MILLIGRAM(S): 100 TABLET, FILM COATED ORAL at 05:46

## 2020-06-12 RX ADMIN — Medication 81 MILLIGRAM(S): at 11:10

## 2020-06-12 RX ADMIN — MUPIROCIN 1 APPLICATION(S): 20 OINTMENT TOPICAL at 05:46

## 2020-06-12 RX ADMIN — Medication 20 MILLIGRAM(S): at 05:46

## 2020-06-12 RX ADMIN — HEPARIN SODIUM 5000 UNIT(S): 5000 INJECTION INTRAVENOUS; SUBCUTANEOUS at 05:46

## 2020-06-12 RX ADMIN — CLOPIDOGREL BISULFATE 75 MILLIGRAM(S): 75 TABLET, FILM COATED ORAL at 11:10

## 2020-06-12 RX ADMIN — Medication 2: at 08:14

## 2020-06-12 RX ADMIN — Medication 4: at 11:54

## 2020-06-12 NOTE — DISCHARGE NOTE PROVIDER - NSDCCPCAREPLAN_GEN_ALL_CORE_FT
PRINCIPAL DISCHARGE DIAGNOSIS  Diagnosis: S/P TAVR (transcatheter aortic valve replacement)  Assessment and Plan of Treatment: shower daily  weigh yourself daily  continue current prescriptions as ordered  increase activity as tolerated   no added salt; low fat; low cholesterol, low salt diabetic diet  check blood sugar levels before meals and at bedtime   follow up with Cardiologist, Dr. Bauman, in 30 days for follow up Echocardiogram and results of cardiac patch (MCOT). Call to schedule appointment. [phone]   follow up with cardiac surgeon, Dr. Pak on June 16th at 8:30 am. CAll to confirm appointment. [phone]

## 2020-06-12 NOTE — DISCHARGE NOTE PROVIDER - NSDCPNSUBOBJ_GEN_ALL_CORE
VSS    tele: RSR     audible S1 S2    lungs clear, RR easy unlabored    +bs nt nd + bm; neg n/v/d    LE: neg calf tenderness, neg LE edema, bilateral groin sites stable; left groin stable hematoma,    leg LE edema, PPP bilaterally        Discharge pt home today 6/12 as per Dr. Pak and Dr. Bauman with MCOT patch

## 2020-06-12 NOTE — DISCHARGE NOTE PROVIDER - NSDCACTIVITY_GEN_ALL_CORE
Stairs allowed/Showering allowed/Sex allowed/Do not make important decisions/Walking - Indoors allowed/No heavy lifting/straining/Walking - Outdoors allowed/Do not drive or operate machinery

## 2020-06-12 NOTE — DISCHARGE NOTE PROVIDER - NSDCMRMEDTOKEN_GEN_ALL_CORE_FT
acetaminophen 325 mg oral tablet: 2 tab(s) orally every 6 hours, As needed, Mild Pain (1 - 3)  Apple Cider Vingear: 500 milligram(s) orally once a day (at bedtime)  aspirin 81 mg oral delayed release tablet: 1 tab(s) orally once a day  clopidogrel 75 mg oral tablet: 1 tab(s) orally once a day  furosemide 20 mg oral tablet: 1 tab(s) orally once a day  glimepiride 2 mg oral tablet: 1 tab(s) orally once a day  Januvia 100 mg oral tablet: 1 tab(s) orally once a day  Lantus 100 units/mL subcutaneous solution: 36 unit(s) subcutaneous once a day (at bedtime)  latanoprost 0.005% ophthalmic solution: 1 drop(s) to each affected eye once a day (at bedtime)  Lipitor 40 mg oral tablet: 1 tab(s) orally once a day (at bedtime)  losartan 50 mg oral tablet: 1 tab(s) orally once a day  Proventil HFA 90 mcg/inh inhalation aerosol: 2 puff(s) inhaled 4 times a day, As Needed  Simbrinza 1%- 0.2% ophthalmic suspension: 1 drop(s) to each affected eye 2 times a day  Singulair 10 mg oral tablet: 1 tab(s) orally once a day (at bedtime)  VESIcare 10 mg oral tablet: 1 tab(s) orally once a day (at bedtime)  Vitamin C 500 mg oral tablet: 1 tab(s) orally once a day (at bedtime)

## 2020-06-12 NOTE — DISCHARGE NOTE PROVIDER - NSDCFUADDINST_GEN_ALL_CORE_FT
call Dr. Pak for fever > 101  antibiotic prophylaxis prior to any invasive procedure  follow up Echocardiogram in 30 days with Dr. Bauman  follow up results of cardiac patch (MCOT) in 30 days with Dr. Bauman

## 2020-06-12 NOTE — DISCHARGE NOTE PROVIDER - HOSPITAL COURSE
73 y/o female    sp TAVR   6/10    with HTN, HLD, Type II DM (on Lantus, glimepiride and Januvia), CAD with 3 stents from March 2019, on Plavix and ASA, and Aortic stenosis    6/10   trf too flr   NSR   BL  groins intact     6/11 VSS - Left groin CDI neg for pseudoaneurysm    6/12 VSS; ekg this am revealed stable L BBB- but stable as per Dr. Bauman- pt cleared for discharge today as per Dr. Pak and Dr. Bauman with MCOT

## 2020-06-12 NOTE — DISCHARGE NOTE PROVIDER - CARE PROVIDER_API CALL
Magen Pak  THORACIC AND CARDIAC SURGERY  [address] [place] [address]  Phone: [phone]  Fax: [phone]  Follow Up Time:     Dat Bauman)  Cardiology; Internal Medicine; Interventional Cardiology  [address] [place] [address]  Phone: [phone]  Fax: [phone]  Follow Up Time:

## 2020-06-12 NOTE — DISCHARGE NOTE NURSING/CASE MANAGEMENT/SOCIAL WORK - PATIENT PORTAL LINK FT
You can access the FollowMyHealth Patient Portal offered by [facility] by registering at the following website: [email or web address]. By joining [employer] [facility]’s FollowMyHealth portal, you will also be able to view your health information using other applications (apps) compatible with our system.

## 2020-06-13 ENCOUNTER — TRANSCRIPTION ENCOUNTER (OUTPATIENT)
Age: 75
End: 2020-06-13

## 2020-06-15 RX ORDER — GLIMEPIRIDE 2 MG/1
2 TABLET ORAL DAILY
Refills: 0 | Status: ACTIVE | COMMUNITY
Start: 2020-06-15

## 2020-06-16 ENCOUNTER — APPOINTMENT (OUTPATIENT)
Dept: CARDIOTHORACIC SURGERY | Facility: CLINIC | Age: 75
End: 2020-06-16
Payer: MEDICARE

## 2020-06-16 ENCOUNTER — NON-APPOINTMENT (OUTPATIENT)
Age: 75
End: 2020-06-16

## 2020-06-16 VITALS
DIASTOLIC BLOOD PRESSURE: 78 MMHG | TEMPERATURE: 97.9 F | SYSTOLIC BLOOD PRESSURE: 134 MMHG | OXYGEN SATURATION: 97 % | WEIGHT: 166 LBS | RESPIRATION RATE: 14 BRPM | BODY MASS INDEX: 32.59 KG/M2 | HEIGHT: 60 IN

## 2020-06-16 PROCEDURE — 99214 OFFICE O/P EST MOD 30 MIN: CPT

## 2020-06-16 RX ORDER — FUROSEMIDE 20 MG/1
20 TABLET ORAL DAILY
Refills: 0 | Status: ACTIVE | COMMUNITY
Start: 2019-10-08

## 2020-07-14 ENCOUNTER — TRANSCRIPTION ENCOUNTER (OUTPATIENT)
Age: 75
End: 2020-07-14

## 2020-07-18 ENCOUNTER — APPOINTMENT (OUTPATIENT)
Dept: CARDIOLOGY | Facility: CLINIC | Age: 75
End: 2020-07-18
Payer: MEDICARE

## 2020-07-18 ENCOUNTER — NON-APPOINTMENT (OUTPATIENT)
Age: 75
End: 2020-07-18

## 2020-07-18 VITALS
HEIGHT: 60 IN | OXYGEN SATURATION: 97 % | DIASTOLIC BLOOD PRESSURE: 82 MMHG | SYSTOLIC BLOOD PRESSURE: 146 MMHG | HEART RATE: 86 BPM

## 2020-07-18 DIAGNOSIS — I44.7 LEFT BUNDLE-BRANCH BLOCK, UNSPECIFIED: ICD-10-CM

## 2020-07-18 PROCEDURE — 93000 ELECTROCARDIOGRAM COMPLETE: CPT

## 2020-07-18 PROCEDURE — 99214 OFFICE O/P EST MOD 30 MIN: CPT

## 2020-07-18 NOTE — REVIEW OF SYSTEMS
[Feeling Fatigued] : feeling fatigued [Dyspnea on exertion] : dyspnea during exertion [Negative] : Endocrine

## 2020-07-18 NOTE — PHYSICAL EXAM
[Normal Appearance] : normal appearance [General Appearance - Well Developed] : well developed [General Appearance - Well Nourished] : well nourished [Well Groomed] : well groomed [General Appearance - In No Acute Distress] : no acute distress [No Deformities] : no deformities [Normal Conjunctiva] : the conjunctiva exhibited no abnormalities [Normal Oral Mucosa] : normal oral mucosa [Eyelids - No Xanthelasma] : the eyelids demonstrated no xanthelasmas [Normal Jugular Venous A Waves Present] : normal jugular venous A waves present [No Oral Cyanosis] : no oral cyanosis [No Oral Pallor] : no oral pallor [Normal Jugular Venous V Waves Present] : normal jugular venous V waves present [No Jugular Venous Almaguer A Waves] : no jugular venous almaguer A waves [Respiration, Rhythm And Depth] : normal respiratory rhythm and effort [Exaggerated Use Of Accessory Muscles For Inspiration] : no accessory muscle use [Auscultation Breath Sounds / Voice Sounds] : lungs were clear to auscultation bilaterally [Heart Rate And Rhythm] : heart rate and rhythm were normal [Heart Sounds] : normal S1 and S2 [Murmurs] : no murmurs present [Abdomen Soft] : soft [Abdomen Tenderness] : non-tender [Abdomen Mass (___ Cm)] : no abdominal mass palpated [Abnormal Walk] : normal gait [Gait - Sufficient For Exercise Testing] : the gait was sufficient for exercise testing [Nail Clubbing] : no clubbing of the fingernails [Petechial Hemorrhages (___cm)] : no petechial hemorrhages [Cyanosis, Localized] : no localized cyanosis [Skin Color & Pigmentation] : normal skin color and pigmentation [] : no rash [Skin Lesions] : no skin lesions [No Venous Stasis] : no venous stasis [No Skin Ulcers] : no skin ulcer [No Xanthoma] : no  xanthoma was observed [No Anxiety] : not feeling anxious [Mood] : the mood was normal [Affect] : the affect was normal [Oriented To Time, Place, And Person] : oriented to person, place, and time

## 2020-07-18 NOTE — REASON FOR VISIT
[Aortic Stenosis] : aortic stenosis [Follow-Up - Clinic] : a clinic follow-up of [Coronary Artery Disease] : coronary artery disease [Heart Failure] : congestive heart failure [Hypertension] : hypertension [Medication Management] : Medication management

## 2020-07-23 ENCOUNTER — APPOINTMENT (OUTPATIENT)
Dept: CARDIOTHORACIC SURGERY | Facility: CLINIC | Age: 75
End: 2020-07-23
Payer: MEDICARE

## 2020-07-23 ENCOUNTER — OUTPATIENT (OUTPATIENT)
Dept: OUTPATIENT SERVICES | Facility: HOSPITAL | Age: 75
LOS: 1 days | End: 2020-07-23
Payer: MEDICARE

## 2020-07-23 VITALS
OXYGEN SATURATION: 95 % | HEART RATE: 79 BPM | TEMPERATURE: 98.5 F | SYSTOLIC BLOOD PRESSURE: 153 MMHG | DIASTOLIC BLOOD PRESSURE: 76 MMHG

## 2020-07-23 DIAGNOSIS — Z90.89 ACQUIRED ABSENCE OF OTHER ORGANS: Chronic | ICD-10-CM

## 2020-07-23 DIAGNOSIS — Z98.890 OTHER SPECIFIED POSTPROCEDURAL STATES: Chronic | ICD-10-CM

## 2020-07-23 DIAGNOSIS — Z90.710 ACQUIRED ABSENCE OF BOTH CERVIX AND UTERUS: Chronic | ICD-10-CM

## 2020-07-23 DIAGNOSIS — I35.0 NONRHEUMATIC AORTIC (VALVE) STENOSIS: ICD-10-CM

## 2020-07-23 LAB
ALBUMIN SERPL ELPH-MCNC: 4.5 G/DL
ALP BLD-CCNC: 88 U/L
ALT SERPL-CCNC: 14 U/L
ANION GAP SERPL CALC-SCNC: 12 MMOL/L
AST SERPL-CCNC: 17 U/L
BASOPHILS # BLD AUTO: 0.08 K/UL
BASOPHILS NFR BLD AUTO: 0.8 %
BILIRUB SERPL-MCNC: 0.2 MG/DL
BUN SERPL-MCNC: 30 MG/DL
CALCIUM SERPL-MCNC: 9.3 MG/DL
CHLORIDE SERPL-SCNC: 105 MMOL/L
CO2 SERPL-SCNC: 24 MMOL/L
CREAT SERPL-MCNC: 1.26 MG/DL
EOSINOPHIL # BLD AUTO: 0.39 K/UL
EOSINOPHIL NFR BLD AUTO: 3.8 %
GLUCOSE SERPL-MCNC: 207 MG/DL
HCT VFR BLD CALC: 40.5 %
HGB BLD-MCNC: 12.9 G/DL
IMM GRANULOCYTES NFR BLD AUTO: 0.4 %
LYMPHOCYTES # BLD AUTO: 1.6 K/UL
LYMPHOCYTES NFR BLD AUTO: 15.4 %
MAN DIFF?: NORMAL
MCHC RBC-ENTMCNC: 30 PG
MCHC RBC-ENTMCNC: 31.9 GM/DL
MCV RBC AUTO: 94.2 FL
MONOCYTES # BLD AUTO: 0.86 K/UL
MONOCYTES NFR BLD AUTO: 8.3 %
NEUTROPHILS # BLD AUTO: 7.41 K/UL
NEUTROPHILS NFR BLD AUTO: 71.3 %
PLATELET # BLD AUTO: 265 K/UL
POTASSIUM SERPL-SCNC: 4.6 MMOL/L
PROT SERPL-MCNC: 7.2 G/DL
RBC # BLD: 4.3 M/UL
RBC # FLD: 12.9 %
SODIUM SERPL-SCNC: 142 MMOL/L
WBC # FLD AUTO: 10.38 K/UL

## 2020-07-23 PROCEDURE — 93306 TTE W/DOPPLER COMPLETE: CPT | Mod: 26

## 2020-07-23 PROCEDURE — 93306 TTE W/DOPPLER COMPLETE: CPT

## 2020-07-23 PROCEDURE — 99214 OFFICE O/P EST MOD 30 MIN: CPT

## 2020-07-23 NOTE — CONSULT LETTER
[Dear  ___] : Dear  [unfilled], [Courtesy Letter:] : I had the pleasure of seeing your patient, [unfilled], in my office today. [Please see my note below.] : Please see my note below. [Consult Closing:] : Thank you very much for allowing me to participate in the care of this patient.  If you have any questions, please do not hesitate to contact me. [FreeTextEntry2] : Yuan Christie MD\par [address]\[place]  [address]

## 2020-07-23 NOTE — ASSESSMENT
[FreeTextEntry1] : Ms Nieves is doing well s/p TF TAVR.  Although she reports improvement, she "thought she'd feel better".  I advised her to continue her current medication regimen as it is working well.  She will continue to follow with Dr. Christie, with whom she has an appointment in early December.  At that time she will revisit her medication regimen as she would like to lower or discontinue the lasix due to lifestyle.  She will continue asa for life and continue Plavix for another few months for her coronary stents as per Dr. Christie.\par \par On examination today she was in NAD.  Her lungs were clear and her heart was regular with no murmurs.  She had no edema and her groin incisions were well healed and soft.\par \par EKG was done today and was SR with a MD of 168 and QRS of 120, which is different from last week which showed a LBBB\par TTE was done and will be reviewed.\par MCOT data is being obtained and will also be reviewed.

## 2020-07-23 NOTE — PHYSICAL EXAM
[Sclera] : the sclera and conjunctiva were normal [Neck Appearance] : the appearance of the neck was normal [Extraocular Movements] : extraocular movements were intact [PERRL With Normal Accommodation] : pupils were equal in size, round, and reactive to light [] : no respiratory distress [Respiration, Rhythm And Depth] : normal respiratory rhythm and effort [Auscultation Breath Sounds / Voice Sounds] : lungs were clear to auscultation bilaterally [Heart Rate And Rhythm] : heart rate was normal and rhythm regular [Heart Sounds] : normal S1 and S2 [Heart Sounds Gallop] : no gallops [Heart Sounds Pericardial Friction Rub] : no pericardial rub [Murmurs] : no murmurs [2+] : left 2+ [Bowel Sounds] : normal bowel sounds [Abdomen Soft] : soft [Abdomen Tenderness] : non-tender [Skin Color & Pigmentation] : normal skin color and pigmentation [Skin Turgor] : normal skin turgor [Cranial Nerves] : cranial nerves 2-12 were intact [No Focal Deficits] : no focal deficits [Oriented To Time, Place, And Person] : oriented to person, place, and time [Impaired Insight] : insight and judgment were intact [Affect] : the affect was normal [Memory Recent] : recent memory was not impaired [Mood] : the mood was normal

## 2020-07-23 NOTE — HISTORY OF PRESENT ILLNESS
[FreeTextEntry1] : Ms Nieves presents for her 30 day follow up s/p TF TAVR (26 S3) for severe symptomatic AS.  Post op course was significant for groin hematoma (no pseudoaneurysm) and new LBBB.  She reports that she sometimes feels "tired" but that she walks upstairs about twice a day without difficulty or sob.  She does report overall improvement since the TAVR.  Her appetite is "ok" and her bowel habits are normal.  \par \par NYHA II\par 5M Gait 5.59, 5.77, 5.44

## 2020-07-23 NOTE — REVIEW OF SYSTEMS
[Negative] : Endocrine [Fever] : no fever [Heart Rate Is Slow] : the heart rate was not slow [Heart Rate Is Fast] : the heart rate was not fast [Chills] : no chills [Chest Pain] : no chest pain [Palpitations] : no palpitations [Lower Ext Edema] : no lower extremity edema [Shortness Of Breath] : no shortness of breath [Wheezing] : no wheezing [Cough] : no cough [SOB on Exertion] : no shortness of breath during exertion

## 2020-07-29 PROBLEM — I44.7 LBBB (LEFT BUNDLE BRANCH BLOCK): Status: ACTIVE | Noted: 2019-10-08

## 2020-07-29 NOTE — HISTORY OF PRESENT ILLNESS
[FreeTextEntry1] : Sherine is returning to the office  \par s/p TAVR\par Still feels weak and tired\par Less SOB.\par \par No syncope\par No LE edema\par No falls or blackouts\par No palpitations\par No bleeding\par \par

## 2020-07-29 NOTE — DISCUSSION/SUMMARY
[FreeTextEntry1] : s/p  PCI and TAVR\par \par Doing well\par No med titration\par Encouraged more walking and recc rehab\par RTO 4-6 mo\par

## 2020-10-07 ENCOUNTER — RX RENEWAL (OUTPATIENT)
Age: 75
End: 2020-10-07

## 2020-12-10 ENCOUNTER — APPOINTMENT (OUTPATIENT)
Dept: CARDIOLOGY | Facility: CLINIC | Age: 75
End: 2020-12-10
Payer: MEDICARE

## 2020-12-10 ENCOUNTER — NON-APPOINTMENT (OUTPATIENT)
Age: 75
End: 2020-12-10

## 2020-12-10 VITALS — OXYGEN SATURATION: 97 % | SYSTOLIC BLOOD PRESSURE: 151 MMHG | HEART RATE: 89 BPM | DIASTOLIC BLOOD PRESSURE: 84 MMHG

## 2020-12-10 PROCEDURE — 99213 OFFICE O/P EST LOW 20 MIN: CPT

## 2020-12-10 PROCEDURE — 93000 ELECTROCARDIOGRAM COMPLETE: CPT

## 2020-12-10 NOTE — REASON FOR VISIT
[Follow-Up - Clinic] : a clinic follow-up of [Aortic Stenosis] : aortic stenosis [Coronary Artery Disease] : coronary artery disease [Dyspnea] : dyspnea [Heart Failure] : congestive heart failure [Hypertension] : hypertension [Medication Management] : Medication management

## 2020-12-13 NOTE — HISTORY OF PRESENT ILLNESS
[FreeTextEntry1] : Sherine is returning to the office \par s/p TAVR - feels only slightly better than prior\par \par No syncope\par No LE edema\par No falls or blackouts\par No palpitations\par No bleeding\par Dyspnea better but still present\par

## 2020-12-13 NOTE — DISCUSSION/SUMMARY
[FreeTextEntry1] : s/p MultiVessel PCI and RX for HF\par Recent TAVR for severe AS\par feeling better overall and appears euvolemic\par No changes to meds\par Encouraged more exercise and walking\par Repeat echo in Spring\par

## 2020-12-14 ENCOUNTER — TRANSCRIPTION ENCOUNTER (OUTPATIENT)
Age: 75
End: 2020-12-14

## 2021-01-28 ENCOUNTER — RX RENEWAL (OUTPATIENT)
Age: 76
End: 2021-01-28

## 2021-02-01 ENCOUNTER — APPOINTMENT (OUTPATIENT)
Dept: CV DIAGNOSITCS | Facility: HOSPITAL | Age: 76
End: 2021-02-01

## 2021-02-04 ENCOUNTER — RX RENEWAL (OUTPATIENT)
Age: 76
End: 2021-02-04

## 2021-02-08 ENCOUNTER — OUTPATIENT (OUTPATIENT)
Dept: OUTPATIENT SERVICES | Facility: HOSPITAL | Age: 76
LOS: 1 days | End: 2021-02-08
Payer: MEDICARE

## 2021-02-08 ENCOUNTER — APPOINTMENT (OUTPATIENT)
Dept: CV DIAGNOSITCS | Facility: HOSPITAL | Age: 76
End: 2021-02-08

## 2021-02-08 DIAGNOSIS — Z98.890 OTHER SPECIFIED POSTPROCEDURAL STATES: Chronic | ICD-10-CM

## 2021-02-08 DIAGNOSIS — Z90.710 ACQUIRED ABSENCE OF BOTH CERVIX AND UTERUS: Chronic | ICD-10-CM

## 2021-02-08 DIAGNOSIS — Z90.89 ACQUIRED ABSENCE OF OTHER ORGANS: Chronic | ICD-10-CM

## 2021-02-08 DIAGNOSIS — I35.0 NONRHEUMATIC AORTIC (VALVE) STENOSIS: ICD-10-CM

## 2021-02-08 PROCEDURE — 93306 TTE W/DOPPLER COMPLETE: CPT

## 2021-02-08 PROCEDURE — 93306 TTE W/DOPPLER COMPLETE: CPT | Mod: 26

## 2021-04-05 ENCOUNTER — RX RENEWAL (OUTPATIENT)
Age: 76
End: 2021-04-05

## 2021-04-15 ENCOUNTER — NON-APPOINTMENT (OUTPATIENT)
Age: 76
End: 2021-04-15

## 2021-04-15 ENCOUNTER — APPOINTMENT (OUTPATIENT)
Dept: CARDIOLOGY | Facility: CLINIC | Age: 76
End: 2021-04-15
Payer: MEDICARE

## 2021-04-15 VITALS
SYSTOLIC BLOOD PRESSURE: 152 MMHG | HEART RATE: 84 BPM | OXYGEN SATURATION: 96 % | DIASTOLIC BLOOD PRESSURE: 83 MMHG | HEIGHT: 60 IN

## 2021-04-15 DIAGNOSIS — I50.23 ACUTE ON CHRONIC SYSTOLIC (CONGESTIVE) HEART FAILURE: ICD-10-CM

## 2021-04-15 PROCEDURE — 99213 OFFICE O/P EST LOW 20 MIN: CPT

## 2021-04-15 PROCEDURE — 93000 ELECTROCARDIOGRAM COMPLETE: CPT

## 2021-04-15 RX ORDER — CLOPIDOGREL BISULFATE 75 MG/1
75 TABLET, FILM COATED ORAL
Qty: 90 | Refills: 3 | Status: DISCONTINUED | COMMUNITY
Start: 2019-03-19 | End: 2021-04-15

## 2021-04-15 NOTE — PHYSICAL EXAM
[General Appearance - Well Developed] : well developed [Normal Appearance] : normal appearance [Well Groomed] : well groomed [General Appearance - Well Nourished] : well nourished [No Deformities] : no deformities [General Appearance - In No Acute Distress] : no acute distress [Normal Conjunctiva] : the conjunctiva exhibited no abnormalities [Eyelids - No Xanthelasma] : the eyelids demonstrated no xanthelasmas [Normal Oral Mucosa] : normal oral mucosa [No Oral Pallor] : no oral pallor [No Oral Cyanosis] : no oral cyanosis [Normal Jugular Venous A Waves Present] : normal jugular venous A waves present [Normal Jugular Venous V Waves Present] : normal jugular venous V waves present [No Jugular Venous Almaguer A Waves] : no jugular venous almaguer A waves [Respiration, Rhythm And Depth] : normal respiratory rhythm and effort [Exaggerated Use Of Accessory Muscles For Inspiration] : no accessory muscle use [Auscultation Breath Sounds / Voice Sounds] : lungs were clear to auscultation bilaterally [Heart Rate And Rhythm] : heart rate and rhythm were normal [Heart Sounds] : normal S1 and S2 [Murmurs] : no murmurs present [Arterial Pulses Normal] : the arterial pulses were normal [Edema] : no peripheral edema present [Abdomen Soft] : soft [Abdomen Tenderness] : non-tender [Abdomen Mass (___ Cm)] : no abdominal mass palpated [Abnormal Walk] : normal gait [Gait - Sufficient For Exercise Testing] : the gait was sufficient for exercise testing [Nail Clubbing] : no clubbing of the fingernails [Cyanosis, Localized] : no localized cyanosis [Petechial Hemorrhages (___cm)] : no petechial hemorrhages [Skin Color & Pigmentation] : normal skin color and pigmentation [] : no rash [No Venous Stasis] : no venous stasis [Skin Lesions] : no skin lesions [No Skin Ulcers] : no skin ulcer [No Xanthoma] : no  xanthoma was observed [Oriented To Time, Place, And Person] : oriented to person, place, and time [Affect] : the affect was normal [Mood] : the mood was normal [No Anxiety] : not feeling anxious

## 2021-04-20 PROBLEM — I50.23 ACUTE ON CHRONIC SYSTOLIC CONGESTIVE HEART FAILURE: Status: ACTIVE | Noted: 2019-04-17

## 2021-04-20 NOTE — HISTORY OF PRESENT ILLNESS
[FreeTextEntry1] : Sherine is returning to the office \par s/p TAVR - feels only slightly better than prior\par \par No syncope\par No LE edema\par No falls or blackouts\par No palpitations\par No bleeding\par Dyspnea better but still present\par c/o cramps in her legs when she walks and feels that it is this that limits her

## 2021-04-20 NOTE — DISCUSSION/SUMMARY
[FreeTextEntry1] : s/p MultiVessel PCI and RX for HF\par Recent TAVR for severe AS\par feeling better overall and appears euvolemic\par Cramping in LE consistent with claudication. Poor distal pulses. HOANG's ordered\par \par

## 2021-05-13 ENCOUNTER — TRANSCRIPTION ENCOUNTER (OUTPATIENT)
Age: 76
End: 2021-05-13

## 2022-01-01 ENCOUNTER — RX RENEWAL (OUTPATIENT)
Age: 77
End: 2022-01-01

## 2022-01-01 ENCOUNTER — NON-APPOINTMENT (OUTPATIENT)
Age: 77
End: 2022-01-01

## 2022-01-01 ENCOUNTER — APPOINTMENT (OUTPATIENT)
Dept: CARDIOLOGY | Facility: CLINIC | Age: 77
End: 2022-01-01
Payer: MEDICARE

## 2022-01-01 VITALS
DIASTOLIC BLOOD PRESSURE: 62 MMHG | TEMPERATURE: 97.4 F | SYSTOLIC BLOOD PRESSURE: 141 MMHG | HEIGHT: 60 IN | HEART RATE: 81 BPM | WEIGHT: 170 LBS | OXYGEN SATURATION: 97 % | BODY MASS INDEX: 33.38 KG/M2

## 2022-01-01 DIAGNOSIS — I25.10 ATHEROSCLEROTIC HEART DISEASE OF NATIVE CORONARY ARTERY W/OUT ANGINA PECTORIS: ICD-10-CM

## 2022-01-01 DIAGNOSIS — Z95.3 PRESENCE OF XENOGENIC HEART VALVE: ICD-10-CM

## 2022-01-01 DIAGNOSIS — E78.5 HYPERLIPIDEMIA, UNSPECIFIED: ICD-10-CM

## 2022-01-01 DIAGNOSIS — I10 ESSENTIAL (PRIMARY) HYPERTENSION: ICD-10-CM

## 2022-01-01 PROCEDURE — 93000 ELECTROCARDIOGRAM COMPLETE: CPT

## 2022-01-01 PROCEDURE — 99214 OFFICE O/P EST MOD 30 MIN: CPT

## 2022-01-01 RX ORDER — LOSARTAN POTASSIUM 50 MG/1
50 TABLET, FILM COATED ORAL DAILY
Qty: 90 | Refills: 3 | Status: ACTIVE | COMMUNITY
Start: 2020-06-12 | End: 1900-01-01

## 2022-01-01 RX ORDER — METOPROLOL SUCCINATE 50 MG/1
50 TABLET, EXTENDED RELEASE ORAL
Qty: 90 | Refills: 3 | Status: ACTIVE | COMMUNITY
Start: 2020-06-16 | End: 1900-01-01

## 2022-04-12 NOTE — PHYSICAL THERAPY INITIAL EVALUATION ADULT - PRECAUTIONS/LIMITATIONS, REHAB EVAL
NAME: Lowell Min ADMIT: 4/3/2022   : 1940  PCP: Dannie Mathews MD    MRN: [id number] LOS: 9 days   AGE/SEX: 82 y.o. female  ROOM: E659/1     Date of Admission:  4/3/2022  Date of Discharge:  2022    PCP: Dannie Mathews MD    CHIEF COMPLAINT  Shortness of Breath and Black or Bloody Stool      DISCHARGE DIAGNOSIS  Active Hospital Problems    Diagnosis  POA   • **Acute on chronic diastolic congestive heart failure (HCC) [I50.33]  Yes   • Diabetic polyneuropathy associated with type 2 diabetes mellitus (HCC) [E11.42]  Unknown   • Symptomatic anemia [D64.9]  Yes   • Anxiety associated with depression [F41.8]  Yes   • Iron deficiency anemia [D50.9]  Yes   • PAF (paroxysmal atrial fibrillation) (HCC) [I48.0]  Yes   • Shortness of breath [R06.02]  Yes   • Chronic kidney disease, stage IV (severe) (HCC) [N18.4]  Yes      Resolved Hospital Problems   No resolved problems to display.       SECONDARY DIAGNOSES  Past Medical History:   Diagnosis Date   • Anxiety    • Atrial fibrillation (HCC)    • Chronic kidney disease, stage IV (severe) (HCC)    • Depression    • Elevated cholesterol    • Hypertension    • Type 2 diabetes mellitus (HCC)        CONSULTS   GI  Nephrology  Pulmonary  Orthopedics    HOSPITAL COURSE  Patient is a 82 y.o. female with history of PAfib, CKD4, CHF, DM presented with CHF and symptomatic anemia. Initially concern for GIB but ended up being issue with hemorrhoids. She does have anemia of chronic renal disease and gets IV iron as an outpatient and had iron while here.      She was in CHF on presentation.  Echocardiogram shows severe pulmonary hypertension.  Turns out she does have obstructive sleep apnea but has been noncompliant with CPAP. She was given IV diuretics with improvement in volume overload.     DIAGNOSTICS    2D ECHO 4/3/22  · Left ventricular ejection fraction appears to be 61 - 65%. Left ventricular systolic function is normal.  · Left ventricular diastolic function  is consistent with (grade II w/high LAP) pseudonormalization.  · Moderate aortic valve stenosis is present. Aortic valve area is 1.31 cm2.Peak velocity of the flow distal to the aortic valve is 317.6 cm/s. Aortic valve maximum pressure gradient is 40.4 mmHg. Aortic valve mean pressure gradient is 20.9 mmHg. Aortic valve dimensionless index is 0.4 .  · Mild mitral valve regurgitation is present. Mild mitral valve stenosis is present. The mitral valve mean gradient is 3.2 mmHg.  · The left atrial cavity is severely dilated.  · Mild tricuspid valve regurgitation is present. Calculated right ventricular systolic pressure from tricuspid regurgitation is 66.2 mmHg. Severe pulmonary hypertension is present         XR Knee 1 or 2 View Left [[id number]] Jaleel as Reviewed   Order Status: Completed Collected: 04/04/22 1932    Updated: 04/04/22 1941   Narrative:     XR KNEE 1 OR 2 VW LEFT-       INDICATIONS: Knee effusion       TECHNIQUE: Frontal and lateral views of the left knee       COMPARISON: None available       FINDINGS:       Degenerative changes of the knee are present, with moderate to large   degenerative spurring, and prominent patellofemoral joint space   narrowing. Minimal to mild knee effusion is suggested. No acute   fracture, erosion, or dislocation is identified.       A mixed density, predominantly sclerotic structure in the distal femoral   shaft measures 2.2 x 1.8 x 5.9 cm, suggests enchondroma. No cortical   breakthrough or associated soft tissue mass is noted, but possibility of   chondrosarcoma cannot entirely be excluded, particularly if there is   pain at this location. If indicated, MRI could be considered for further   evaluation, and interval radiographic follow-up in 3-6 months is   recommended to characterize change.       Impression:         As described.       This report was finalized on 4/4/2022 7:38 PM by Dr. Pérez Swartz M.D.       XR Chest 1 View [[id number]] Jaleel as Reviewed   Order  Status: Completed Collected: 04/03/22 0121    Updated: 04/03/22 0125   Narrative:     SINGLE VIEW OF THE CHEST       HISTORY: Shortness of air       COMPARISON: None available.       FINDINGS:   Cardiomegaly is present. There is vascular congestion. No pneumothorax   is seen. No definite pleural effusion is identified. Lung volumes are   diminished, with bibasilar atelectasis noted. There is calcification of   the aorta.       Impression:     Cardiomegaly with vascular congestion.      04/12/2022 0537 04/12/2022 0725 Renal Function Panel [[id number]]    (Abnormal)   Blood    Final result Component Value Units   Glucose 130 High  mg/dL   BUN 43 High  mg/dL   Creatinine 2.28 High  mg/dL   Sodium 140 mmol/L   Potassium 3.4 Low  mmol/L   Chloride 100 mmol/L   CO2 27.7 mmol/L   Calcium 10.1 mg/dL   Albumin 3.60 g/dL   Phosphorus 4.3 mg/dL   Anion Gap 12.3 mmol/L   BUN/Creatinine Ratio 18.9    eGFR 21.0 Low   mL/min/1.73        04/05/2022 0717 04/05/2022 0749 Hemoglobin A1c [[id number]]    (Abnormal)   Blood    Final result Component Value Units   Hemoglobin A1C 6.30 High  %          04/05/2022 0717 04/05/2022 0800 Uric Acid [[id number]]   (Abnormal)   Blood    Final result Component Value Units   Uric Acid 8.0 High  mg/dL          04/04/2022 1103 04/04/2022 1152 Vitamin B12 [[id number]]    Blood    Final result Component Value Units   Vitamin B-12 538 pg/mL          04/04/2022 1103 04/04/2022 1152 Folate [[id number]]    Blood    Final result Component Value Units   Folate >20.00 ng/mL            04/01/2022 1404 04/01/2022 1440 Iron Profile [[id number]]   (Abnormal)   Blood    Final result Component Value Units   Iron 42 mcg/dL   Iron Saturation 9 Low  %   Transferrin 319 mg/dL   TIBC 475 mcg/dL          04/01/2022 1404 04/01/2022 1446 Ferritin [[id number]]    Blood    Final result Component Value Units   Ferritin 43.10 ng/mL                  PHYSICAL EXAM  Objective    Alert  nad  No resp distress  Soft, nt  Trace LE  edema    CONDITION ON DISCHARGE  Stable.      DISCHARGE DISPOSITION   Skilled Nursing Facility (DC - External)      DISCHARGE MEDICATIONS       Your medication list      START taking these medications      Instructions Last Dose Given Next Dose Due   acetaminophen 325 MG tablet  Commonly known as: TYLENOL      Take 2 tablets by mouth Every 4 (Four) Hours As Needed for Mild Pain .       insulin lispro 100 UNIT/ML injection  Commonly known as: ADMELOG      Inject 0-7 Units under the skin into the appropriate area as directed 3 (Three) Times a Day Before Meals.       nystatin [id number] UNIT/GM powder  Commonly known as: MYCOSTATIN      Apply  topically to the appropriate area as directed Every 12 (Twelve) Hours.       polycarbophil 625 MG tablet tablet  Start taking on: April 13, 2022      Take 2 tablets by mouth Daily.       sennosides-docusate 8.6-50 MG per tablet  Commonly known as: PERICOLACE      Take 2 tablets by mouth 2 (Two) Times a Day.          CHANGE how you take these medications      Instructions Last Dose Given Next Dose Due   apixaban 2.5 MG tablet tablet  Commonly known as: ELIQUIS  What changed:   · medication strength  · how much to take      Take 1 tablet by mouth 2 (Two) Times a Day. Indications: Atrial Fibrillation       furosemide 40 MG tablet  Commonly known as: LASIX  Start taking on: April 13, 2022  What changed:   · medication strength  · how much to take      Take 1 tablet by mouth Daily.       gabapentin 100 MG capsule  Commonly known as: NEURONTIN  What changed:   · medication strength  · how much to take  · additional instructions      Take 1 capsule by mouth 3 (Three) Times a Day.       metoprolol tartrate 50 MG tablet  Commonly known as: LOPRESSOR  What changed:   · medication strength  · how much to take  · when to take this  · additional instructions      Take 1 tablet by mouth 2 (Two) Times a Day.          CONTINUE taking these medications      Instructions Last Dose Given Next Dose Due    atorvastatin 40 MG tablet  Commonly known as: LIPITOR      Take 1 tablet by mouth every night at bedtime.       multivitamin with minerals tablet tablet      Take 1 tablet by mouth Daily.       omeprazole 40 MG capsule  Commonly known as: priLOSEC      Take 40 mg by mouth Daily.       venlafaxine XR 75 MG 24 hr capsule  Commonly known as: EFFEXOR-XR      Take 75 mg by mouth Daily.          STOP taking these medications    amLODIPine 5 MG tablet  Commonly known as: NORVASC              Where to Get Your Medications      You can get these medications from any pharmacy    Bring a paper prescription for each of these medications  · gabapentin 100 MG capsule     Information about where to get these medications is not yet available    Ask your nurse or doctor about these medications  · acetaminophen 325 MG tablet  · apixaban 2.5 MG tablet tablet  · furosemide 40 MG tablet  · insulin lispro 100 UNIT/ML injection  · metoprolol tartrate 50 MG tablet  · nystatin 524610 UNIT/GM powder  · polycarbophil 625 MG tablet tablet  · sennosides-docusate 8.6-50 MG per tablet          Future Appointments   Date Time Provider Department Center   4/15/2022  2:00 PM ROOM 5 [facility]  [facility]   [facility]   6/30/2022 11:00 AM Farida Harley MD [facility]     Additional Instructions for the Follow-ups that You Need to Schedule     Discharge Follow-up with Specialty: Rehab physician this week,  PCP after dc from rehab, Nephrology in 3-4 weeks   As directed      Specialty: Rehab physician this week,  PCP after dc from rehab, Nephrology in 3-4 weeks            Follow-up Information     Andrew Saini MD Follow up in 3 month(s).    Specialties: Pulmonary Disease, Sleep Medicine  Why: Follow-up on the inpatient sleep study ordered to be done in [facility]  Contact information:  [address] [place] [id number] [phone]             Dannie Mathews MD  cardiac precautions/fall precautions .    Specialty: Internal Medicine  Contact information:  [address] [facility]  [address] [place], [place] [id number]  [phone]                         TEST  RESULTS PENDING AT DISCHARGE         Truman Grant MD  [facility]  04/12/22  10:29 EDT      Time: greater than 32 minutes on discharge  It was a pleasure taking care of this patient while in the hospital.

## 2022-11-11 PROBLEM — Z95.3 S/P TAVR (TRANSCATHETER AORTIC VALVE REPLACEMENT), BIOPROSTHETIC: Status: ACTIVE | Noted: 2020-06-12

## 2022-11-11 NOTE — DISCUSSION/SUMMARY
[FreeTextEntry1] : s/p MultiVessel PCI and RX for HF\par TAVR for severe AS\par \par Given MORTON and fatigue I asked her to have an echo performed to assess TAVR function and a stress test to check for stent restenosis. [EKG obtained to assist in diagnosis and management of assessed problem(s)] : EKG obtained to assist in diagnosis and management of assessed problem(s)

## 2022-11-11 NOTE — PHYSICAL EXAM
[Well Developed] : well developed [Well Nourished] : well nourished [No Acute Distress] : no acute distress [Normal Conjunctiva] : normal conjunctiva [No Carotid Bruit] : no carotid bruit [Normal Venous Pressure] : normal venous pressure [Normal S1, S2] : normal S1, S2 [No Rub] : no rub [No Gallop] : no gallop [Murmur] : murmur [Clear Lung Fields] : clear lung fields [Good Air Entry] : good air entry [No Respiratory Distress] : no respiratory distress  [Soft] : abdomen soft [Non Tender] : non-tender [No Masses/organomegaly] : no masses/organomegaly [Normal Bowel Sounds] : normal bowel sounds [Normal Gait] : normal gait [No Edema] : no edema [No Cyanosis] : no cyanosis [No Clubbing] : no clubbing [No Varicosities] : no varicosities [No Skin Lesions] : no skin lesions [No Rash] : no rash [Moves all extremities] : moves all extremities [No Focal Deficits] : no focal deficits [Normal Speech] : normal speech [Alert and Oriented] : alert and oriented [Normal memory] : normal memory [de-identified] : 2/6 DENI

## 2022-11-11 NOTE — HISTORY OF PRESENT ILLNESS
[FreeTextEntry1] : Sherine is returning to the office \par c/o MORTON and fatigue\par No syncope\par No LE edema\par No falls or blackouts\par No palpitations\par No bleeding

## 2022-11-11 NOTE — REASON FOR VISIT
[Structural Heart and Valve Disease] : structural heart and valve disease [Symptom and Test Evaluation] : symptom and test evaluation [Hyperlipidemia] : hyperlipidemia [Coronary Artery Disease] : coronary artery disease

## 2022-11-11 NOTE — CARDIOLOGY SUMMARY
[de-identified] : 11/10/22\par Sinus  Rhythm \par -Left bundle branch block and left axis. \par \par ABNORMAL \par

## 2023-01-11 ENCOUNTER — APPOINTMENT (OUTPATIENT)
Dept: CV DIAGNOSITCS | Facility: HOSPITAL | Age: 78
End: 2023-01-11

## 2023-01-11 ENCOUNTER — APPOINTMENT (OUTPATIENT)
Dept: CV DIAGNOSTICS | Facility: HOSPITAL | Age: 78
End: 2023-01-11

## 2023-02-05 NOTE — PROVIDER CONTACT NOTE (CRITICAL VALUE NOTIFICATION) - NS PROVIDER READ BACK TO LAB
patient BIBA as notification cardiac arrest , ACLS in progress , directed to resus room Md and nurses by bed side , intubated on the field yes

## 2023-12-05 NOTE — H&P PST ADULT - TEMPERATURE IN CELSIUS (DEGREES C)
Continue current dose of warfarin as instructed on dosing calendar provided. Continue to monitor urine and stool for signs and symptoms of bleeding. Please notify the clinic of any medication changes. Please remember to bring all medications (both prescription and OTC) to your next visit. Kindly notify the clinic if you are unable to make to your next appointment.
36.5

## 2024-04-24 NOTE — H&P PST ADULT - ALCOHOL USE HISTORY SINGLE SELECT
** PHARMACY CHANGE REQUEST **    Requested Prescriptions     Signed Prescriptions Disp Refills    ondansetron ODT (ZOFRAN-ODT) 4 MG disintegrating tablet 60 tablet 1     Sig: Place 1 tablet on the tongue Every 8 (Eight) Hours As Needed for Nausea or Vomiting.     Authorizing Provider: TANK CULLEN     Ordering User: [facility] [address] [phone] [facility] [phone]  [phone]    never